# Patient Record
Sex: MALE | Race: WHITE | NOT HISPANIC OR LATINO | Employment: FULL TIME | URBAN - METROPOLITAN AREA
[De-identification: names, ages, dates, MRNs, and addresses within clinical notes are randomized per-mention and may not be internally consistent; named-entity substitution may affect disease eponyms.]

---

## 2017-08-11 DIAGNOSIS — E78.5 HYPERLIPIDEMIA LDL GOAL <130: ICD-10-CM

## 2017-08-14 NOTE — TELEPHONE ENCOUNTER
simvastatin     Last Written Prescription Date: 8/31/2016  Last Fill Quantity: 90, # refills: 3  Last Office Visit with [facility] or [facility] prescribing provider: 8/31/2016       Lab Results   Component Value Date    CHOL 200 08/31/2016     Lab Results   Component Value Date    HDL 53 08/31/2016     Lab Results   Component Value Date     08/31/2016     Lab Results   Component Value Date    TRIG 115 08/31/2016     Lab Results   Component Value Date    CHOLHDLRATIO 3.5 06/05/2015     Please make pt upcoming appt, due 8/2017.  Tegan Doty RN

## 2017-08-21 ENCOUNTER — TELEPHONE (OUTPATIENT)
Dept: FAMILY MEDICINE | Facility: CLINIC | Age: 57
End: 2017-08-21

## 2017-08-21 DIAGNOSIS — E78.5 HYPERLIPIDEMIA LDL GOAL <130: ICD-10-CM

## 2017-08-21 RX ORDER — SIMVASTATIN 20 MG
20 TABLET ORAL AT BEDTIME
Qty: 90 TABLET | Refills: 3 | OUTPATIENT
Start: 2017-08-21

## 2017-08-21 RX ORDER — SIMVASTATIN 20 MG
TABLET ORAL
Qty: 90 TABLET | Refills: 0 | OUTPATIENT
Start: 2017-08-21

## 2017-08-21 NOTE — TELEPHONE ENCOUNTER
Reason for Call:  Medication or medication refill:    Do you use a [facility]?  Name of the pharmacy and phone number for the current request:  [facility] [address] [facility] [address] [place] [address] & [address]    Name of the medication requested: simvastatin (ZOCOR) 20 MG tablet    Other request: Pharm called said refill was denied.     Can we leave a detailed message on this number? YES    Phone number patient can be reached at: Other phone number:  [phone]    Best Time: anytime     Call taken on 8/21/2017 at 12:53 PM by Giovana Rosales

## 2017-08-21 NOTE — TELEPHONE ENCOUNTER
Called # [phone] (home)     Pt stated he made an OV 9/15/2017    He has enough to get him to his appointment     Tamiko Pearson RN, BSN  [place] Triage

## 2017-11-21 DIAGNOSIS — E78.5 HYPERLIPIDEMIA LDL GOAL <130: ICD-10-CM

## 2017-11-21 RX ORDER — SIMVASTATIN 20 MG
20 TABLET ORAL AT BEDTIME
Qty: 30 TABLET | Refills: 0 | Status: SHIPPED | OUTPATIENT
Start: 2017-11-21 | End: 2017-12-15

## 2017-11-21 NOTE — TELEPHONE ENCOUNTER
Requested Prescriptions   Pending Prescriptions Disp Refills     simvastatin (ZOCOR) 20 MG tablet 90 tablet 3     Sig: Take 1 tablet (20 mg) by mouth At Bedtime    Statins Protocol Failed    11/21/2017  3:58 PM       Failed - LDL on file in past 12 months    Recent Labs   Lab Test  08/31/16   0821   LDL  124*            Passed - No abnormal creatine kinase in past 12 months    No lab results found.         Passed - Recent or future visit with authorizing provider    Patient had office visit in the last year or has a visit in the next 30 days with authorizing provider.  See chart review.              Passed - Patient is age 18 or older            Prescription approved per [facility] Refill Protocol.  Patient has physical scheduled.    Lisa Salcedo, BS, RN, PHN  [facility]) [phone]

## 2017-11-21 NOTE — TELEPHONE ENCOUNTER
Reason for Call:  Medication or medication refill:Refill  Do you use a [facility]?  Name of the pharmacy and phone number for the current request:  [facility] - [phone]    Name of the medication requested: simvastatin (ZOCOR) 20 MG tablet  Other request: n/a     Can we leave a detailed message on this number? YES    Phone number patient can be reached at: Home number on file [phone] (home)    Best Time: Anytime     Call taken on 11/21/2017 at 3:56 PM by Lynette Bermudez

## 2017-12-15 ENCOUNTER — OFFICE VISIT (OUTPATIENT)
Dept: FAMILY MEDICINE | Facility: CLINIC | Age: 57
End: 2017-12-15
Payer: COMMERCIAL

## 2017-12-15 VITALS
SYSTOLIC BLOOD PRESSURE: 124 MMHG | WEIGHT: 225 LBS | DIASTOLIC BLOOD PRESSURE: 78 MMHG | HEIGHT: 71 IN | TEMPERATURE: 97.9 F | OXYGEN SATURATION: 95 % | HEART RATE: 92 BPM | BODY MASS INDEX: 31.5 KG/M2

## 2017-12-15 DIAGNOSIS — Z51.81 MEDICATION MONITORING ENCOUNTER: ICD-10-CM

## 2017-12-15 DIAGNOSIS — Z00.00 ENCOUNTER FOR ROUTINE ADULT HEALTH EXAMINATION WITHOUT ABNORMAL FINDINGS: Primary | ICD-10-CM

## 2017-12-15 DIAGNOSIS — Z12.5 SCREENING FOR PROSTATE CANCER: ICD-10-CM

## 2017-12-15 DIAGNOSIS — Z23 NEED FOR PROPHYLACTIC VACCINATION AND INOCULATION AGAINST INFLUENZA: ICD-10-CM

## 2017-12-15 DIAGNOSIS — E78.5 HYPERLIPIDEMIA LDL GOAL <130: ICD-10-CM

## 2017-12-15 DIAGNOSIS — M79.674 PAIN OF RIGHT GREAT TOE: ICD-10-CM

## 2017-12-15 DIAGNOSIS — Z91.09 ENVIRONMENTAL ALLERGIES: ICD-10-CM

## 2017-12-15 DIAGNOSIS — Z86.0100 HISTORY OF COLONIC POLYPS: ICD-10-CM

## 2017-12-15 DIAGNOSIS — M25.561 CHRONIC PAIN OF RIGHT KNEE: ICD-10-CM

## 2017-12-15 DIAGNOSIS — Z12.11 SCREEN FOR COLON CANCER: ICD-10-CM

## 2017-12-15 DIAGNOSIS — G89.29 CHRONIC PAIN OF RIGHT KNEE: ICD-10-CM

## 2017-12-15 DIAGNOSIS — H61.23 BILATERAL IMPACTED CERUMEN: ICD-10-CM

## 2017-12-15 DIAGNOSIS — G47.33 OBSTRUCTIVE SLEEP APNEA SYNDROME: ICD-10-CM

## 2017-12-15 DIAGNOSIS — K21.9 GASTROESOPHAGEAL REFLUX DISEASE WITHOUT ESOPHAGITIS: ICD-10-CM

## 2017-12-15 LAB
ALBUMIN SERPL-MCNC: 4.1 G/DL (ref 3.4–5)
ALBUMIN UR-MCNC: NEGATIVE MG/DL
ALP SERPL-CCNC: 62 U/L (ref 40–150)
ALT SERPL W P-5'-P-CCNC: 41 U/L (ref 0–70)
ANION GAP SERPL CALCULATED.3IONS-SCNC: 7 MMOL/L (ref 3–14)
APPEARANCE UR: CLEAR
AST SERPL W P-5'-P-CCNC: 21 U/L (ref 0–45)
BILIRUB SERPL-MCNC: 1.1 MG/DL (ref 0.2–1.3)
BILIRUB UR QL STRIP: NEGATIVE
BUN SERPL-MCNC: 24 MG/DL (ref 7–30)
CALCIUM SERPL-MCNC: 9.3 MG/DL (ref 8.5–10.1)
CHLORIDE SERPL-SCNC: 108 MMOL/L (ref 94–109)
CHOLEST SERPL-MCNC: 204 MG/DL
CK SERPL-CCNC: 136 U/L (ref 30–300)
CO2 SERPL-SCNC: 28 MMOL/L (ref 20–32)
COLOR UR AUTO: YELLOW
CREAT SERPL-MCNC: 1 MG/DL (ref 0.66–1.25)
ERYTHROCYTE [DISTWIDTH] IN BLOOD BY AUTOMATED COUNT: 12.6 % (ref 10–15)
GFR SERPL CREATININE-BSD FRML MDRD: 77 ML/MIN/1.7M2
GLUCOSE SERPL-MCNC: 102 MG/DL (ref 70–99)
GLUCOSE UR STRIP-MCNC: NEGATIVE MG/DL
HCT VFR BLD AUTO: 45.9 % (ref 40–53)
HDLC SERPL-MCNC: 55 MG/DL
HGB BLD-MCNC: 15.7 G/DL (ref 13.3–17.7)
HGB UR QL STRIP: NEGATIVE
KETONES UR STRIP-MCNC: NEGATIVE MG/DL
LDLC SERPL CALC-MCNC: 124 MG/DL
LEUKOCYTE ESTERASE UR QL STRIP: NEGATIVE
MCH RBC QN AUTO: 30.8 PG (ref 26.5–33)
MCHC RBC AUTO-ENTMCNC: 34.2 G/DL (ref 31.5–36.5)
MCV RBC AUTO: 90 FL (ref 78–100)
NITRATE UR QL: NEGATIVE
NONHDLC SERPL-MCNC: 149 MG/DL
PH UR STRIP: 6 PH (ref 5–7)
PLATELET # BLD AUTO: 276 10E9/L (ref 150–450)
POTASSIUM SERPL-SCNC: 4 MMOL/L (ref 3.4–5.3)
PROT SERPL-MCNC: 7.3 G/DL (ref 6.8–8.8)
PSA SERPL-ACNC: 1.24 UG/L (ref 0–4)
RBC # BLD AUTO: 5.09 10E12/L (ref 4.4–5.9)
SODIUM SERPL-SCNC: 143 MMOL/L (ref 133–144)
SOURCE: NORMAL
SP GR UR STRIP: 1.01 (ref 1–1.03)
TRIGL SERPL-MCNC: 124 MG/DL
TSH SERPL DL<=0.005 MIU/L-ACNC: 1.61 MU/L (ref 0.4–4)
UROBILINOGEN UR STRIP-ACNC: 0.2 EU/DL (ref 0.2–1)
WBC # BLD AUTO: 6.4 10E9/L (ref 4–11)

## 2017-12-15 PROCEDURE — 82043 UR ALBUMIN QUANTITATIVE: CPT | Performed by: FAMILY MEDICINE

## 2017-12-15 PROCEDURE — 81003 URINALYSIS AUTO W/O SCOPE: CPT | Performed by: FAMILY MEDICINE

## 2017-12-15 PROCEDURE — 80053 COMPREHEN METABOLIC PANEL: CPT | Performed by: FAMILY MEDICINE

## 2017-12-15 PROCEDURE — 90471 IMMUNIZATION ADMIN: CPT | Performed by: FAMILY MEDICINE

## 2017-12-15 PROCEDURE — 80061 LIPID PANEL: CPT | Performed by: FAMILY MEDICINE

## 2017-12-15 PROCEDURE — G0103 PSA SCREENING: HCPCS | Performed by: FAMILY MEDICINE

## 2017-12-15 PROCEDURE — 90686 IIV4 VACC NO PRSV 0.5 ML IM: CPT | Performed by: FAMILY MEDICINE

## 2017-12-15 PROCEDURE — 84443 ASSAY THYROID STIM HORMONE: CPT | Performed by: FAMILY MEDICINE

## 2017-12-15 PROCEDURE — 36415 COLL VENOUS BLD VENIPUNCTURE: CPT | Performed by: FAMILY MEDICINE

## 2017-12-15 PROCEDURE — 85027 COMPLETE CBC AUTOMATED: CPT | Performed by: FAMILY MEDICINE

## 2017-12-15 PROCEDURE — 99396 PREV VISIT EST AGE 40-64: CPT | Mod: 25 | Performed by: FAMILY MEDICINE

## 2017-12-15 PROCEDURE — 82550 ASSAY OF CK (CPK): CPT | Performed by: FAMILY MEDICINE

## 2017-12-15 RX ORDER — NEOMYCIN/POLYMYXIN B/PRAMOXINE 3.5-10K-1
CREAM (GRAM) TOPICAL
COMMUNITY
Start: 2017-12-15

## 2017-12-15 RX ORDER — CETIRIZINE HYDROCHLORIDE 10 MG/1
10 TABLET ORAL EVERY EVENING
Qty: 30 TABLET | Refills: 1 | COMMUNITY
Start: 2017-12-15 | End: 2019-03-25

## 2017-12-15 NOTE — LETTER
[address] [address]                  [phone]   December 18, 2017    Nam May  [address] [facility] [place] [address]      Dear Nam,    Here is a summary of your recent test results:    Labs are overall quite good, except borderline elevated glucose and lipids.     We advise:     Continue current cares.   Balanced low cholesterol diet.   Regular exercise.     For additional lab test information, [email or web address] is an excellent reference.     Your test results are enclosed.      Please contact me if you have any questions.    In addition, here is a list of due or overdue Health Maintenance reminders.    Health Maintenance Due   Topic Date Due     Colon Cancer Screening - FIT Test - yearly  10/25/2014     Please call us at [phone] (or use imoji) to address the above recommendations.            Thank you very much for trusting [facility]..     Healthy regards,        Juan C Palacio M.D.      Results for orders placed or performed in visit on 12/15/17   Comprehensive metabolic panel   Result Value Ref Range    Sodium 143 133 - 144 mmol/L    Potassium 4.0 3.4 - 5.3 mmol/L    Chloride 108 94 - 109 mmol/L    Carbon Dioxide 28 20 - 32 mmol/L    Anion Gap 7 3 - 14 mmol/L    Glucose 102 (H) 70 - 99 mg/dL    Urea Nitrogen 24 7 - 30 mg/dL    Creatinine 1.00 0.66 - 1.25 mg/dL    GFR Estimate 77 >60 mL/min/1.7m2    GFR Estimate If Black >90 >60 mL/min/1.7m2    Calcium 9.3 8.5 - 10.1 mg/dL    Bilirubin Total 1.1 0.2 - 1.3 mg/dL    Albumin 4.1 3.4 - 5.0 g/dL    Protein Total 7.3 6.8 - 8.8 g/dL    Alkaline Phosphatase 62 40 - 150 U/L    ALT 41 0 - 70 U/L    AST 21 0 - 45 U/L   Lipid panel reflex to direct LDL Fasting   Result Value Ref Range    Cholesterol 204 (H) <200 mg/dL    Triglycerides 124 <150 mg/dL    HDL Cholesterol 55 >39 mg/dL    LDL Cholesterol Calculated 124 (H) <100 mg/dL    Non HDL Cholesterol 149 (H)  <130 mg/dL   CK total   Result Value Ref Range    CK Total 136 30 - 300 U/L   CBC with platelets   Result Value Ref Range    WBC 6.4 4.0 - 11.0 10e9/L    RBC Count 5.09 4.4 - 5.9 10e12/L    Hemoglobin 15.7 13.3 - 17.7 g/dL    Hematocrit 45.9 40.0 - 53.0 %    MCV 90 78 - 100 fl    MCH 30.8 26.5 - 33.0 pg    MCHC 34.2 31.5 - 36.5 g/dL    RDW 12.6 10.0 - 15.0 %    Platelet Count 276 150 - 450 10e9/L   TSH with free T4 reflex   Result Value Ref Range    TSH 1.61 0.40 - 4.00 mU/L   Prostate spec antigen screen   Result Value Ref Range    PSA 1.24 0 - 4 ug/L   Albumin Random Urine Quantitative with Creat Ratio   Result Value Ref Range    Creatinine Urine 227 mg/dL    Albumin Urine mg/L 10 mg/L    Albumin Urine mg/g Cr 4.41 0 - 17 mg/g Cr   *UA reflex to Microscopic and Culture ([place] and [facility] (except [place] and [place])   Result Value Ref Range    Color Urine Yellow     Appearance Urine Clear     Glucose Urine Negative NEG^Negative mg/dL    Bilirubin Urine Negative NEG^Negative    Ketones Urine Negative NEG^Negative mg/dL    Specific Gravity Urine 1.015 1.003 - 1.035    Blood Urine Negative NEG^Negative    pH Urine 6.0 5.0 - 7.0 pH    Protein Albumin Urine Negative NEG^Negative mg/dL    Urobilinogen Urine 0.2 0.2 - 1.0 EU/dL    Nitrite Urine Negative NEG^Negative    Leukocyte Esterase Urine Negative NEG^Negative    Source Midstream Urine

## 2017-12-15 NOTE — PATIENT INSTRUCTIONS
Follow up with sleep clinic as discussed    Use OTC Mucinex DM and warm showers to help with congestion    Follow up with podiatrist as discussed    Use OTC products to aid with earwax as discussed    [facility]                        To reach your care team during and after hours:   [phone]  To reach our pharmacy:        [phone]    Clinic Hours                        Our clinic hours are:    Monday   7:30 am to 7:00 pm                  Tuesday through Friday 7:30 am to 5:00 pm                             Saturday   8:00 am to 12:00 pm      Sunday   Closed      Pharmacy Hours                        Our pharmacy hours are:    Monday   8:30 am to 7:00 pm       Tuesday to Friday  8:30 am to 6:00 pm                       Saturday    9:00 am to 1:00 pm              Sunday    Closed              There is also information available at our web site:  [email or web address]    If your provider ordered any lab tests and you do not receive the results within 10 business days, please call the clinic.    If you need a medication refill please contact your pharmacy.  Please allow 2-3 business days for your refill to be completed.    Our clinic offers telephone visits and e visits.  Please ask one of your team members to explain more.      Use [employer] (secure email communication and access to your chart) to send your primary care provider a message or make an appointment. Ask someone on your Team how to sign up for [email or web address].  Immunizations                      Immunization History   Administered Date(s) Administered     Influenza (IIV3) PF 10/25/2013     Poliovirus, inactivated (IPV) 01/21/2011     TD (ADULT, 7+) 04/22/1997, 07/18/2003     TDAP Vaccine (Boostrix) 01/21/2011     Twinrix A/B 01/21/2011, 02/26/2011, 08/05/2011     Typhoid Oral 01/21/2011        Health Maintenance                         Health Maintenance Due   Topic Date Due     Colon Cancer Screening - FIT Test - yearly  10/25/2014     Discuss  Advance Directive Planning  09/08/2015     Prostate Test (PSA) - yearly  08/31/2017     Wellness Visit with your Primary Provider - yearly  08/31/2017     Flu Vaccine - yearly  09/01/2017       Preventive Health Recommendations  Male Ages 50   64    Yearly exam:             See your health care provider every year in order to  o   Review health changes.   o   Discuss preventive care.    o   Review your medicines if your doctor has prescribed any.     Have a cholesterol test every 5 years, or more frequently if you are at risk for high cholesterol/heart disease.     Have a diabetes test (fasting glucose) every three years. If you are at risk for diabetes, you should have this test more often.     Have a colonoscopy at age 50, or have a yearly FIT test (stool test). These exams will check for colon cancer.      Talk with your health care provider about whether or not a prostate cancer screening test (PSA) is right for you.    You should be tested each year for STDs (sexually transmitted diseases), if you re at risk.     Shots: Get a flu shot each year. Get a tetanus shot every 10 years.     Nutrition:    Eat at least 5 servings of fruits and vegetables daily.     Eat whole-grain bread, whole-wheat pasta and brown rice instead of white grains and rice.     Talk to your provider about Calcium and Vitamin D.     Lifestyle    Exercise for at least 150 minutes a week (30 minutes a day, 5 days a week). This will help you control your weight and prevent disease.     Limit alcohol to one drink per day.     No smoking.     Wear sunscreen to prevent skin cancer.     See your dentist every six months for an exam and cleaning.     See your eye doctor every 1 to 2 years.

## 2017-12-15 NOTE — NURSING NOTE
"Chief Complaint   Patient presents with     Physical       Initial /78  Pulse 92  Temp 97.9  F (36.6  C) (Oral)  Ht 5' 11\" (1.803 m)  Wt 225 lb (102.1 kg)  SpO2 95%  BMI 31.38 kg/m2 Estimated body mass index is 31.38 kg/(m^2) as calculated from the following:    Height as of this encounter: 5' 11\" (1.803 m).    Weight as of this encounter: 225 lb (102.1 kg)..  BP completed using cuff size: luis miguel Sosa MA  "

## 2017-12-15 NOTE — MR AVS SNAPSHOT
After Visit Summary   12/15/2017    Nam May    MRN: [id number]           Patient Information     Date Of Birth          1960        Visit Information        Provider Department      12/15/2017 7:40 AM Juan C Palacio MD [facility] [place]        Today's Diagnoses     Encounter for routine adult health examination without abnormal findings    -  1    Obstructive sleep apnea syndrome        Gastroesophageal reflux disease without esophagitis        Hyperlipidemia LDL goal <130        Environmental allergies        Pain of right great toe        Chronic pain of right knee        Bilateral impacted cerumen        History of colonic polyps        Screen for colon cancer        Screening for prostate cancer        Need for prophylactic vaccination and inoculation against influenza        Medication monitoring encounter          Care Instructions    Follow up with sleep clinic as discussed    Use OTC Mucinex DM and warm showers to help with congestion    Follow up with podiatrist as discussed    Use OTC products to aid with earwax as discussed    [facility] [place]                        To reach your care team during and after hours:   [phone]  To reach our pharmacy:        [phone]    Clinic Hours                        Our clinic hours are:    Monday   7:30 am to 7:00 pm                  Tuesday through Friday 7:30 am to 5:00 pm                             Saturday   8:00 am to 12:00 pm      Sunday   Closed      Pharmacy Hours                        Our pharmacy hours are:    Monday   8:30 am to 7:00 pm       Tuesday to Friday  8:30 am to 6:00 pm                       Saturday    9:00 am to 1:00 pm              Sunday    Closed              There is also information available at our web site:  [email or web address]    If your provider ordered any lab tests and you do not receive the results within 10 business days, please call the clinic.    If you need a medication refill  please contact your pharmacy.  Please allow 2-3 business days for your refill to be completed.    Our clinic offers telephone visits and e visits.  Please ask one of your team members to explain more.      Use [employer] (secure email communication and access to your chart) to send your primary care provider a message or make an appointment. Ask someone on your Team how to sign up for [employer]WithMet.  Immunizations                      Immunization History   Administered Date(s) Administered     Influenza (IIV3) PF 10/25/2013     Poliovirus, inactivated (IPV) 01/21/2011     TD (ADULT, 7+) 04/22/1997, 07/18/2003     TDAP Vaccine (Boostrix) 01/21/2011     Twinrix A/B 01/21/2011, 02/26/2011, 08/05/2011     Typhoid Oral 01/21/2011        Health Maintenance                         Health Maintenance Due   Topic Date Due     Colon Cancer Screening - FIT Test - yearly  10/25/2014     Discuss Advance Directive Planning  09/08/2015     Prostate Test (PSA) - yearly  08/31/2017     Wellness Visit with your Primary Provider - yearly  08/31/2017     Flu Vaccine - yearly  09/01/2017       Preventive Health Recommendations  Male Ages 50 - 64    Yearly exam:             See your health care provider every year in order to  o   Review health changes.   o   Discuss preventive care.    o   Review your medicines if your doctor has prescribed any.     Have a cholesterol test every 5 years, or more frequently if you are at risk for high cholesterol/heart disease.     Have a diabetes test (fasting glucose) every three years. If you are at risk for diabetes, you should have this test more often.     Have a colonoscopy at age 50, or have a yearly FIT test (stool test). These exams will check for colon cancer.      Talk with your health care provider about whether or not a prostate cancer screening test (PSA) is right for you.    You should be tested each year for STDs (sexually transmitted diseases), if you re at risk.     Shots: Get a flu shot each  year. Get a tetanus shot every 10 years.     Nutrition:    Eat at least 5 servings of fruits and vegetables daily.     Eat whole-grain bread, whole-wheat pasta and brown rice instead of white grains and rice.     Talk to your provider about Calcium and Vitamin D.     Lifestyle    Exercise for at least 150 minutes a week (30 minutes a day, 5 days a week). This will help you control your weight and prevent disease.     Limit alcohol to one drink per day.     No smoking.     Wear sunscreen to prevent skin cancer.     See your dentist every six months for an exam and cleaning.     See your eye doctor every 1 to 2 years.            Follow-ups after your visit        Additional Services     ORTHO  REFERRAL       [facility] is referring you to the Orthopedic  Services at [facility].       The  Representative will assist you in the coordination of your Orthopedic and Musculoskeletal Care as prescribed by your physician.    The  Representative will call you within 1 business day to help schedule your appointment, or you may contact the  Representative at:    All areas ~ [phone]     Type of Referral : [facility] / Foot & Ankle Surgery - Rt Great Toe    Non Surgical - Sports med - Rt knee pain      Timeframe requested: Within 2 weeks    Coverage of these services is subject to the terms and limitations of your health insurance plan.  Please call member services at your health plan with any benefit or coverage questions.      If X-rays, CT or MRI's have been performed, please contact the facility where they were done to arrange for , prior to your scheduled appointment.  Please bring this referral request to your appointment and present it to your specialist.                  Future tests that were ordered for you today     Open Future Orders        Priority Expected Expires Ordered    Fecal colorectal cancer screen (FIT)  "Routine 1/5/2018 3/9/2018 12/15/2017            Who to contact     If you have questions or need follow up information about today's clinic visit or your schedule please contact [facility] directly at [phone].  Normal or non-critical lab and imaging results will be communicated to you by [employer], letter or phone within 4 business days after the clinic has received the results. If you do not hear from us within 7 days, please contact the clinic through [employer] or phone. If you have a critical or abnormal lab result, we will notify you by phone as soon as possible.  Submit refill requests through [employer] or call your pharmacy and they will forward the refill request to us. Please allow 3 business days for your refill to be completed.          Additional Information About Your Visit        [employer] Information     [employer] gives you secure access to your electronic health record. If you see a primary care provider, you can also send messages to your care team and make appointments. If you have questions, please call your primary care clinic.  If you do not have a primary care provider, please call [phone] and they will assist you.        Care EveryWhere ID     This is your Care EveryWhere ID. This could be used by other organizations to access your [place] medical records  [id number]        Your Vitals Were     Pulse Temperature Height Pulse Oximetry BMI (Body Mass Index)       92 97.9  F (36.6  C) (Oral) 5' 11\" (1.803 m) 95% 31.38 kg/m2        Blood Pressure from Last 3 Encounters:   12/15/17 124/78   08/31/16 128/82   06/05/15 108/78    Weight from Last 3 Encounters:   12/15/17 225 lb (102.1 kg)   08/31/16 224 lb (101.6 kg)   06/05/15 220 lb 9 oz (100 kg)              We Performed the Following     *UA reflex to Microscopic and Culture ([place] and [facility] (except [place] and [place])     Albumin Random Urine Quantitative with Creat Ratio     CBC with platelets     CK total     " Comprehensive metabolic panel     HC FLU VAC PRESRV FREE QUAD SPLIT VIR 3+YRS IM     Lipid panel reflex to direct LDL Fasting     ORTHO  REFERRAL     Prostate spec antigen screen     REMOVE IMPACTED CERUMEN     TSH with free T4 reflex          Today's Medication Changes          These changes are accurate as of: 12/15/17  9:23 AM.  If you have any questions, ask your nurse or doctor.               Stop taking these medicines if you haven't already. Please contact your care team if you have questions.     sildenafil 20 MG tablet   Commonly known as:  REVATIO   Stopped by:  Juan C Palacio MD                    Primary Care Provider Office Phone # Fax #    Juan C Palacio [phone] [address] [facility] [address]        Equal Access to Services     [facility] HospitalCLAUDIA : Hadii sugar ramirez hadasho Soomaali, waaxda luqadaha, qaybta kaalmada adeegyada, stephanie roa . So [facility] [phone].    ATENCIÓN: Si habla español, tiene a le disposición servicios gratuitos de asistencia lingüística. Llame al [phone].    We comply with applicable federal civil rights laws and [place] laws. We do not discriminate on the basis of race, color, national origin, age, disability, sex, sexual orientation, or gender identity.            Thank you!     Thank you for choosing [facility]  for your care. Our goal is always to provide you with excellent care. Hearing back from our patients is one way we can continue to improve our services. Please take a few minutes to complete the written survey that you may receive in the mail after your visit with us. Thank you!             Your Updated Medication List - Protect others around you: Learn how to safely use, store and throw away your medicines at www.disposemymeds.org.          This list is accurate as of: 12/15/17  9:23 AM.  Always use your most recent med list.                   Brand Name Dispense Instructions for use  Diagnosis    aspirin 81 MG tablet      1 TABLET DAILY    Routine general medical examination at a health care facility       cetirizine 10 MG tablet    zyrTEC    30 tablet    Take 1 tablet (10 mg) by mouth every evening        MULTIVITAMIN PO      None Entered        Omega-3 Krill Oil 500 MG Caps           simvastatin 20 MG tablet    ZOCOR    30 tablet    Take 1 tablet (20 mg) by mouth At Bedtime    Hyperlipidemia LDL goal <130

## 2017-12-15 NOTE — PROGRESS NOTES
"SUBJECTIVE:   CC: Nam May is an 57 year old male who presents for preventative health visit.     Physical   Annual:     Getting at least 3 servings of Calcium per day::  Yes    Bi-annual eye exam::  Yes    Dental care twice a year::  Yes    Sleep apnea or symptoms of sleep apnea::  None    Diet::  Regular (no restrictions)    Frequency of exercise::  4-5 days/week    Duration of exercise::  30-45 minutes    Taking medications regularly::  Yes    Medication side effects::  None    Additional concerns today::  YES          Right Knee -- Nam reported that he had surgery in 2010 - still having issues when working out. Knee doesn't hurt but feels like it there is \"glue\" in his knees. Notes the sensation started during use of \"incline\" machine.    Sleep apnea -- Nam notes that his sleep has been \"okay\" but still occasionally gets tired on waking. Plans to decrease emily to 195 lbs from current weight to aid with sleep aids.    Umbilical hernia -- Nam wanted to have his umbilical hernia checked, but does not note any pain or odd sensations.    Allergies -- Nam would like to discontinue his of Zyrtec due to concerns of mucus build up in the nose. Also he notes he is fine with allergies during the winter season.    Retinal tear in eye -- Nam notes he is still following up with opthomologist. Notes vision has a persistent \"dirty glass\" look.    Heartburn -- well controlled.    Toe fusion --  Surgery 2003 -- Nam is concerned about the appearance of his toe. Doesn't know if it is due to a \"pin\" from surgery.       Today's PHQ-2 Score:   PHQ-2 ( 1999 Pfizer) 12/12/2017   Q1: Little interest or pleasure in doing things 0   Q2: Feeling down, depressed or hopeless 0   PHQ-2 Score 0   Q1: Little interest or pleasure in doing things Not at all   Q2: Feeling down, depressed or hopeless Not at all   PHQ-2 Score 0       Abuse: Current or Past(Physical, Sexual or Emotional)- No  Do you feel safe in your environment - " Yes    Social History   Substance Use Topics     Smoking status: Former Smoker     Types: Cigars     Smokeless tobacco: Never Used      Comment: rare cigar <10 per year     Alcohol use 2.4 - 7.2 oz/week     4 - 12 Standard drinks or equivalent per week      Comment: avg 4-12 beers per week     No flowsheet data found.No flowsheet data found.      Last PSA:   PSA   Date Value Ref Range Status   08/31/2016 0.91 0 - 4 ug/L Final     Comment:     Assay Method:  Chemiluminescence using Siemens Vista analyzer       Reviewed orders with patient. Reviewed health maintenance and updated orders accordingly - Yes  Reviewed and updated as needed this visit by clinical staff  Tobacco  Allergies  Med Hx  Surg Hx  Fam Hx  Soc Hx        Reviewed and updated as needed this visit by Provider  Allergies        Health Maintenance     Colonoscopy:  5 years, due 3/18 (last 3/130   FIT:  Yearly, due (last 3/12)              PSA:  Yearly, due (last 8/16)   DEXA:  n/a    Health Maintenance Due   Topic Date Due     FIT Q1 YR  10/25/2014       Current Problem List    Patient Active Problem List   Diagnosis     History of colonic polyps     GERD (gastroesophageal reflux disease)     Hyperlipidemia LDL goal <130     Seasonal allergies     Sleep apnea     ED (erectile dysfunction)     Retinal tear of left eye       Past Medical History    Past Medical History:   Diagnosis Date     ED (erectile dysfunction) 2013    mild - Dr Cotto     Family history of malignant neoplasm of gastrointestinal tract     PGM - colon ca     GERD (gastroesophageal reflux disease)      Hyperlipidemia LDL goal <130 2008     Personal history of colonic polyps 12/99, 12/02, 3/05, 12/08    adenoma, tubular adenoma      Retinal tear of left eye 2/15    VRS     Seasonal allergies summer/fall    Allergic rhinitis H/O AIT - ragweed     Sleep apnea 4/11    severe - CPAP       Past Surgical History    Past Surgical History:   Procedure Laterality Date     COLONOSCOPY   , , 3/05, , 3/13    Dr Gordillo- due 2018 - tubular adenoma      HC REMOVE TONSILS/ADENOIDS,<13 Y/O  1964    T & A <12y.o.     STRESS ECHO ([facility])  10/08    normal     SURGICAL HISTORY OF -       rt great toe fusion     SURGICAL HISTORY OF -   9/10    Rt Knee Arthroscopy - Dr Curtis     SURGICAL HISTORY OF -   2/15    VRS - surgery       Current Medications    Current Outpatient Prescriptions   Medication Sig Dispense Refill     cetirizine (ZYRTEC) 10 MG tablet Take 1 tablet (10 mg) by mouth every evening 30 tablet 1     Omega-3 Krill Oil 500 MG CAPS        simvastatin (ZOCOR) 20 MG tablet Take 1 tablet (20 mg) by mouth At Bedtime 30 tablet 0     ASPIRIN 81 MG OR TABS 1 TABLET DAILY       MULTIVITAMIN OR None Entered         Allergies    No Known Allergies    Immunizations    Immunization History   Administered Date(s) Administered     Influenza (IIV3) PF 10/25/2013     Influenza Vaccine IM 3yrs+ 4 Valent IIV4 12/15/2017     MMR 08/15/1990     Poliovirus, inactivated (IPV) 2011     TD (ADULT, 7+) 1997, 2003     TDAP Vaccine (Boostrix) 2011     Twinrix A/B 2011, 2011, 2011     Typhoid Oral 2011       Family History    Family History   Problem Relation Age of Onset     CANCER Father      lymphoma - stage 1E - ? aggressive type -      Alzheimer Disease Father      CEREBROVASCULAR DISEASE Father       2016     Obesity Mother      Hypertension Mother      C.A.D. Paternal Grandfather       at age 56     Cancer - colorectal Paternal Grandmother      40's     CEREBROVASCULAR DISEASE Maternal Grandfather       at age 79     C.A.D. Maternal Grandfather        Social History    Social History     Social History     Marital status:      Spouse name: Arabella     Number of children: 2     Years of education: 18     Occupational History      [employer]     Social History Main Topics     Smoking status: Former Smoker     Types:  "Cigars     Smokeless tobacco: Never Used      Comment: rare cigar <10 per year     Alcohol use 2.4 - 7.2 oz/week     4 - 12 Standard drinks or equivalent per week      Comment: avg 4-12 beers per week     Drug use: No     Sexual activity: Yes     Partners: Female     Other Topics Concern     Caffeine Concern Yes     2 cups qd     Exercise Yes     5-6 days per week     Seat Belt Yes     Parent/Sibling W/ Cabg, Mi Or Angioplasty Before 65f 55m? No     Social History Narrative     Review of Systems  Constitutional, HEENT, cardiovascular, pulmonary, GI, , musculoskeletal, neuro, skin, endocrine and psych systems are negative, except as in HPI or otherwise noted     This document serves as a record of the services and decisions personally performed and made by Juan C Palacio MD FAAFP. It was created on their behalf by Mahesh Duff, a trained medical scribe. The creation of this document is based the provider's statements to the medical scribe.  Mahesh Duff December 15, 2017 8:25 AM      OBJECTIVE:   /78  Pulse 92  Temp 97.9  F (36.6  C) (Oral)  Ht 5' 11\" (1.803 m)  Wt 225 lb (102.1 kg)  SpO2 95%  BMI 31.38 kg/m2  Physical Exam  GENERAL: healthy, alert and no distress, obese   HENT: ear canals have build up of cerumen and TM's normal upon viewing with otoscope, nose and mouth without ulcers or lesions upon viewing with otoscope  RESP: lungs clear to auscultation - no rales, no rhonchi, no wheezes  CV: regular rates and rhythm, normal S1 S2, no S3 or S4 and no murmur, no click or rub - no carotid bruits.  ABDOMEN: soft, no tenderness, no  hepatosplenomegaly, no masses, normal bowel sounds  MS: extremities- no gross deformities noted, no edema  SKIN: no suspicious lesions, no rashes to visible skin  : testicles normal without atrophy or masses, no hernias, penis normal without urethral discharge  RECTAL: normal sphincter tone, no rectal masses, prostate smooth, without masses  NEURO: mentation intact and speech " normal  BACK: no CVA tenderness, no paralumbar tenderness  PSYCH: affect normal/bright    ASSESSMENT/PLAN:       ICD-10-CM    1. Encounter for routine adult health examination without abnormal findings Z00.00 Comprehensive metabolic panel     Lipid panel reflex to direct LDL Fasting     CK total     CBC with platelets     TSH with free T4 reflex     Prostate spec antigen screen     Albumin Random Urine Quantitative with Creat Ratio     *UA reflex to Microscopic and Culture ([facility] (except [place] and [place])     Fecal colorectal cancer screen (FIT)   2. Obstructive sleep apnea syndrome G47.33 TSH with free T4 reflex   3. Gastroesophageal reflux disease without esophagitis K21.9 CBC with platelets   4. Hyperlipidemia LDL goal <130 E78.5 Comprehensive metabolic panel     Lipid panel reflex to direct LDL Fasting     CK total   5. Environmental allergies Z91.09    6. Pain of right great toe M79.674 ORTHO  REFERRAL   7. Chronic pain of right knee M25.561 ORTHO  REFERRAL    G89.29    8. Bilateral impacted cerumen H61.23 REMOVE IMPACTED CERUMEN   9. History of colonic polyps Z86.010 Fecal colorectal cancer screen (FIT)   10. Screen for colon cancer Z12.11 Fecal colorectal cancer screen (FIT)   11. Screening for prostate cancer Z12.5 Prostate spec antigen screen   12. Need for prophylactic vaccination and inoculation against influenza Z23 HC FLU VAC PRESRV FREE QUAD SPLIT VIR 3+YRS IM   13. Medication monitoring encounter Z51.81 Comprehensive metabolic panel     Lipid panel reflex to direct LDL Fasting     CK total     CBC with platelets     TSH with free T4 reflex     Albumin Random Urine Quantitative with Creat Ratio     *UA reflex to Microscopic and Culture ([facility] (except [place] and [place])     Discussed treatment/modality options, including risk and benefits, he desires advised alcohol consumption 1oz per day or less, advised aspirin 81 mg po daily,  "advised 1 multivitamin per day, advised calcium 8905-3506 mg/d and Vitamin D 800-1200 IU/d, advised dentist every 6 months, advised diet, exercise, and weight loss, advised opthalmologist every 1-2 years, advised self testicular exam q month, further diagnostic(s), further health care maintenance, further lab(s), immunizations (influenza), medication refill(s), OTC meds, referrals (Orthopedics), and observation. All diagnosis above reviewed and noted above, otherwise stable.  See [employer] orders for further details.  Follow up in 1 year(s) and as needed.    Health Maintenance Due   Topic Date Due     FIT Q1 YR  10/25/2014     COUNSELING:   Reviewed preventive health counseling, as reflected in patient instructions     reports that he has quit smoking. His smoking use included Cigars. He has never used smokeless tobacco.    Estimated body mass index is 31.38 kg/(m^2) as calculated from the following:    Height as of this encounter: 5' 11\" (1.803 m).    Weight as of this encounter: 225 lb (102.1 kg).   Weight management plan: Discussed healthy diet and exercise guidelines and patient will follow up in 12 months in clinic to re-evaluate.    Counseling Resources:  ATP IV Guidelines  Pooled Cohorts Equation Calculator  FRAX Risk Assessment  ICSI Preventive Guidelines  Dietary Guidelines for Americans, 2010  USDA's MyPlate  ASA Prophylaxis  Lung CA Screening    The information in this document, created by the medical scribe for me, accurately reflects the services I personally performed and the decisions made by me. I have reviewed and approved this document for accuracy.   Juan C Palacio MD FAAFP            Juan C Palacio MD, FAAFP    [address]  [address] [phone] Fax    "

## 2017-12-17 LAB
CREAT UR-MCNC: 227 MG/DL
MICROALBUMIN UR-MCNC: 10 MG/L
MICROALBUMIN/CREAT UR: 4.41 MG/G CR (ref 0–17)

## 2017-12-18 RX ORDER — SIMVASTATIN 20 MG
TABLET ORAL
Qty: 90 TABLET | Refills: 3 | Status: SHIPPED | OUTPATIENT
Start: 2017-12-18 | End: 2018-11-24

## 2017-12-18 NOTE — TELEPHONE ENCOUNTER
Requested Prescriptions   Pending Prescriptions Disp Refills     simvastatin (ZOCOR) 20 MG tablet [Pharmacy Med Name: SIMVASTATIN 20MG TABLETS]  Last Written Prescription Date:  11/21/2017  Last Fill Quantity: 30 tablet,  # refills: 0   Last Office Visit with [facility], [facility] or [facility] prescribing provider:  12/15/2017   Future Office Visit:      30 tablet 0     Sig: TAKE 1 TABLET(20 MG) BY MOUTH AT BEDTIME    Statins Protocol Passed    12/15/2017  5:10 PM       Passed - LDL on file in past 12 months    Recent Labs   Lab Test  12/15/17   0814   LDL  124*          Passed - No abnormal creatine kinase in past 12 months    No lab results found.         Passed - Recent or future visit with authorizing provider    Patient had office visit in the last year or has a visit in the next 30 days with authorizing provider.  See chart review.          Passed - Patient is age 18 or older

## 2017-12-18 NOTE — TELEPHONE ENCOUNTER
Routing refill request to provider for review/approval because:  Labs out of range:  See below, LDL.    Tegan Doty RN  [facility]

## 2018-03-23 ENCOUNTER — OFFICE VISIT (OUTPATIENT)
Dept: ORTHOPEDICS | Facility: CLINIC | Age: 58
End: 2018-03-23
Payer: COMMERCIAL

## 2018-03-23 ENCOUNTER — RADIANT APPOINTMENT (OUTPATIENT)
Dept: GENERAL RADIOLOGY | Facility: CLINIC | Age: 58
End: 2018-03-23
Attending: PODIATRIST
Payer: COMMERCIAL

## 2018-03-23 ENCOUNTER — OFFICE VISIT (OUTPATIENT)
Dept: PODIATRY | Facility: CLINIC | Age: 58
End: 2018-03-23
Payer: COMMERCIAL

## 2018-03-23 ENCOUNTER — RADIANT APPOINTMENT (OUTPATIENT)
Dept: GENERAL RADIOLOGY | Facility: CLINIC | Age: 58
End: 2018-03-23
Attending: FAMILY MEDICINE
Payer: COMMERCIAL

## 2018-03-23 VITALS
DIASTOLIC BLOOD PRESSURE: 75 MMHG | WEIGHT: 223 LBS | SYSTOLIC BLOOD PRESSURE: 121 MMHG | HEIGHT: 71 IN | BODY MASS INDEX: 31.22 KG/M2

## 2018-03-23 VITALS
DIASTOLIC BLOOD PRESSURE: 70 MMHG | BODY MASS INDEX: 31.22 KG/M2 | SYSTOLIC BLOOD PRESSURE: 122 MMHG | HEIGHT: 71 IN | WEIGHT: 223 LBS

## 2018-03-23 DIAGNOSIS — M25.561 ARTHRALGIA OF RIGHT LOWER LEG: Primary | ICD-10-CM

## 2018-03-23 DIAGNOSIS — M25.561 ARTHRALGIA OF RIGHT LOWER LEG: ICD-10-CM

## 2018-03-23 DIAGNOSIS — M79.671 RIGHT FOOT PAIN: Primary | ICD-10-CM

## 2018-03-23 DIAGNOSIS — M19.071 PRIMARY LOCALIZED OSTEOARTHROSIS OF RIGHT ANKLE AND FOOT: ICD-10-CM

## 2018-03-23 DIAGNOSIS — M79.671 RIGHT FOOT PAIN: ICD-10-CM

## 2018-03-23 DIAGNOSIS — M25.851 HIP IMPINGEMENT SYNDROME, RIGHT: ICD-10-CM

## 2018-03-23 PROCEDURE — 73562 X-RAY EXAM OF KNEE 3: CPT

## 2018-03-23 PROCEDURE — 99243 OFF/OP CNSLTJ NEW/EST LOW 30: CPT | Performed by: FAMILY MEDICINE

## 2018-03-23 PROCEDURE — 99203 OFFICE O/P NEW LOW 30 MIN: CPT | Performed by: PODIATRIST

## 2018-03-23 PROCEDURE — 73630 X-RAY EXAM OF FOOT: CPT | Mod: RT

## 2018-03-23 NOTE — PATIENT INSTRUCTIONS
1. Arthralgia of right lower leg    2. Hip impingement syndrome, right      Discussed exam - knee is stable, no fluid today.  Would recommend addressing lack of hip mobility and stabilzation.  Reviewed xray - well preserved joint sapce  Physical therapy: [facility] - [phone]  Active to your tolerance    Follow up as needed. Call direct clinic number [[phone]] at any time with questions or concerns.      NEW WEBSITE HAS LAUNCHED  [email or web address]    We care about your feedback and use it to improve our services. Please leave feedback on the Testimonials & Reviews page.

## 2018-03-23 NOTE — LETTER
"    3/23/2018         RE: Nam May  [address] [facility] [place] [address]        Dear Colleague,    Thank you for referring your patient, Nam May, to the [facility]. Please see a copy of my visit note below.    ASSESSMENT & PLAN    1. Arthralgia of right lower leg    2. Hip impingement syndrome, right      Discussed exam - knee is stable, no fluid today.  Would recommend addressing lack of hip mobility and stabilzation.  Reviewed xray - well preserved joint sapce  Physical therapy: [facility] - [phone]  Active to your tolerance    Follow up as needed. Call direct clinic number [[phone]] at any time with questions or concerns.    -----    SUBJECTIVE  Nam May is a/an 57 year old male who is seen in consultation at the request of  Juan C Palacio M.D. for evaluation of right knee pain. The patient is seen by themselves.    Onset: 8-9 month(s) ago.  Patient feels like his right knee seems to be related to altered gate due to toe/foot issues that he is treating with Dr. Navarro.  Location of Pain: right diffuse knee pain  Rating of Pain at worst: 4/10  Rating of Pain Currently: 0/10  Worsened by: knee fatigues after prolonged walking  Better with: rest  Treatments tried: ibuprofen  Associated symptoms:  Denies any swelling, locking or catching  Orthopedic history: none  Relevant surgical history:  right menisectomy 2011  Patient Social History: works in IT    Patient's past medical, surgical, social, and family histories were reviewed today and no changes are noted.    REVIEW OF SYSTEMS:  10 point ROS is negative other than symptoms noted above in HPI, Past Medical History or as stated below  Constitutional: NEGATIVE for fever, chills, change in weight  Skin: NEGATIVE for worrisome rashes, moles or lesions  GI/: NEGATIVE for bowel or bladder changes  Neuro: NEGATIVE for weakness, dizziness or paresthesias    OBJECTIVE:  /75  Ht 5' 11\" " (1.803 m)  Wt 223 lb (101.2 kg)  BMI 31.1 kg/m2   General: healthy, alert and in no distress  HEENT: no scleral icterus or conjunctival erythema  Skin: no suspicious lesions or rash. No jaundice.  CV: no pedal edema  Resp: normal respiratory effort without conversational dyspnea   Psych: normal mood and affect  Gait: normal steady gait with appropriate coordination and balance  Neuro: Normal light sensory exam of lower extremity  MSK:  RIGHT KNEE  Inspection:    normal alignment  Palpation:    NonTender about the lateral patellar facet, medial patellar facet, lateral joint line and medial joint line. Remainder of bony and ligamentous landmarks are nontender.    No effusion is present    Patellofemoral crepitus is Present  Range of Motion:     00 extension to 1350 flexion  Strength:    Extensor mechanism intact  Special Tests:    Negative: Patellar grind, MCL/valgus stress (0 & 30 deg), LCL/varus stress (0 & 30 deg), Lachman's, posterior drawer, Isi's    RIGHT HIP  Inspection:    No obvious deformity or asymmetry, leg length difference (right longer)  Active Range of Motion:     Flexion full, IR lacking but no painful / ER  full  Strength:    Flexion 5/5 /  Special Tests:    Negative: Logroll, anterior impingement (FADIR)    Independent visualization of the below image:  KNEE STANDING AP BILATERAL SUNRISE BILATERAL LATERAL RIGHT     3/23/2018 2:23 PM      HISTORY: Arthralgia of right lower leg     COMPARISON: Right knee x-ray from 7/18/2010.         IMPRESSION: Joint spaces are well-preserved. No acute fracture or  subluxation.     ASTRID ANAYA MD    Patient's conditions were thoroughly discussed during today's visit with greater than 50% of the visit spent counseling the patient with total time spent face-to-face with the patient being 20 minutes.    Josh Duong, DO [facility]        Again, thank you for allowing me to participate in the care of your patient.         Sincerely,        Josh Duong DO

## 2018-03-23 NOTE — LETTER
3/23/2018         RE: Nam May  [address] [address] [address]        Dear Colleague,    Thank you for referring your patient, Nam May, to the [facility]. Please see a copy of my visit note below.    PATIENT HISTORY:   Nam May is a 57 year old male who presents to clinic for pain to right foot. Notes that he had a fusion 2-4 years ago by an outside provider. Notes pain with walking. 4/10. Has not done anything for this. Would like to know what can be done to stabilize the foot. Notes pain is intermittent.     Review of Systems:  Patient denies fever, chills, rash, wound, stiffness, limping, numbness, weakness, heart burn, blood in stool, chest pain with activity, calf pain when walking, shortness of breath with activity, chronic cough, easy bleeding/bruising, swelling of ankles, excessive thirst, fatigue, depression, anxiety.     PAST MEDICAL HISTORY:   Past Medical History:   Diagnosis Date     ED (erectile dysfunction) 2013    mild - Dr Cotto     Family history of malignant neoplasm of gastrointestinal tract     PGM - colon ca     GERD (gastroesophageal reflux disease)      Hyperlipidemia LDL goal <130 2008     Personal history of colonic polyps 12/99, 12/02, 3/05, 12/08    adenoma, tubular adenoma      Retinal tear of left eye 2/15    VRS     Seasonal allergies summer/fall    Allergic rhinitis H/O AIT - ragweed     Sleep apnea 4/11    severe - CPAP        PAST SURGICAL HISTORY:   Past Surgical History:   Procedure Laterality Date     COLONOSCOPY  12/99, 12/02, 3/05, 12/08, 3/13    Dr Gordillo- due 2018 - tubular adenoma      HC REMOVE TONSILS/ADENOIDS,<11 Y/O  1964    T & A <12y.o.     STRESS ECHO ([facility])  10/08    normal     SURGICAL HISTORY OF -   1/03    rt great toe fusion     SURGICAL HISTORY OF -   9/10    Rt Knee Arthroscopy - Dr Curtis     SURGICAL HISTORY OF -   2/15    VRS - surgery        MEDICATIONS:   Current Outpatient Prescriptions:       "simvastatin (ZOCOR) 20 MG tablet, TAKE 1 TABLET(20 MG) BY MOUTH AT BEDTIME, Disp: 90 tablet, Rfl: 3     cetirizine (ZYRTEC) 10 MG tablet, Take 1 tablet (10 mg) by mouth every evening, Disp: 30 tablet, Rfl: 1     Omega-3 Krill Oil 500 MG CAPS, , Disp: , Rfl:      ASPIRIN 81 MG OR TABS, 1 TABLET DAILY, Disp: , Rfl:      MULTIVITAMIN OR, None Entered, Disp: , Rfl:      ALLERGIES:  No Known Allergies     SOCIAL HISTORY:   Social History     Social History     Marital status:      Spouse name: Arabella     Number of children: 2     Years of education: 18     Occupational History      [employer]     Social History Main Topics     Smoking status: Former Smoker     Types: Cigars     Smokeless tobacco: Never Used      Comment: rare cigar <10 per year     Alcohol use 2.4 - 7.2 oz/week     4 - 12 Standard drinks or equivalent per week      Comment: avg 4-12 beers per week     Drug use: No     Sexual activity: Yes     Partners: Female     Other Topics Concern     Caffeine Concern Yes     2 cups qd     Exercise Yes     5-6 days per week     Seat Belt Yes     Parent/Sibling W/ Cabg, Mi Or Angioplasty Before 65f 55m? No     Social History Narrative        FAMILY HISTORY:   Family History   Problem Relation Age of Onset     CANCER Father      lymphoma - stage 1E - ? aggressive type -      Alzheimer Disease Father      CEREBROVASCULAR DISEASE Father       2016     Obesity Mother      Hypertension Mother      C.A.D. Paternal Grandfather       at age 56     Cancer - colorectal Paternal Grandmother      40's     CEREBROVASCULAR DISEASE Maternal Grandfather       at age 79     C.A.D. Maternal Grandfather         EXAM:Vitals: /70  Ht 5' 11\" (1.803 m)  Wt 223 lb (101.2 kg)  BMI 31.1 kg/m2  BMI= Body mass index is 31.1 kg/(m^2).    General appearance: Patient is alert and fully cooperative with history & exam.  No sign of distress is noted during the visit.     Psychiatric: Affect is pleasant & " appropriate.  Patient appears motivated to improve health.     Respiratory: Breathing is regular & unlabored while sitting.     HEENT: Hearing is intact to spoken word.  Speech is clear.  No gross evidence of visual impairment that would impact ambulation.     Dermatologic: Skin is intact to both lower extremities without significant lesions, rash or abrasion.  No paronychia or evidence of soft tissue infection is noted.     Vascular: DP & PT pulses are intact & regular bilaterally.  No significant edema or varicosities noted.  CFT and skin temperature is normal to both lower extremities.     Neurologic: Lower extremity sensation is intact to light touch.  No evidence of weakness or contracture in the lower extremities.  No evidence of neuropathy.     Musculoskeletal: Patient is ambulatory without assistive device or brace.  Pain to the 1st metatarsal phalangeal joint. No range of motion due to previous fusion. Prominent spur medial right 1st interphalangeal joint.       Radiographs:  I personally reviewed the xrays. Arthritic changes noted to right 1st interphalangeal joint.  1st metatarsal phalangeal joint has been fused.      ASSESSMENT:    Right foot pain  Primary localized osteoarthrosis of right ankle and foot     PLAN:  [facility] in [facility]. Reviewed xrays.  Talked about arthritis and treatments including orthotics, injections, icing, NSAIDS, bracing, physical therapy, compounding pain cream, or surgical intervention.    Pain with pressure to dorsal 1st metatarsal. With previous fusion, want to make sure this area is completely healed. Will order CT and call with results. Don't know if there is much that can be done if fusion is healed.        Milly Navarro DPM, Podiatry/Foot and Ankle Surgery    Weight management plan: Patient was referred to their PCP to discuss a diet and exercise plan.      Again, thank you for allowing me to participate in the care of your patient.         Sincerely,        Milly Navarro DPM, Podiatry/Foot and Ankle Surgery

## 2018-03-23 NOTE — PATIENT INSTRUCTIONS
Thank you for choosing [facility]!    DR. [facility] SCHEDULE  MONDAY AM - [place] TUESDAY - [place]   [address] [address] [address] [address] [place] [address]   [phone] / [phone] / [phone]       WEDNESDAY - [place] FRIDAY AM - [facility]   [address]   [place] [address] [place] [address]   [phone] / [phone]       FRIDAY PM - [place] SCHEDULE SURGERY: [phone]   [address] BILLING QUESTIONS: [phone]   [place] [address] AFTER HOURS: [phone]   [phone] / [phone] APPOINTMENTS: [phone]     [facility] SCHEDULING  They should be calling you within 24 hours to schedule your scan.  If not, please call the location discussed at your appointment.    1) [facility] [place]:       [phone]      [address] [place] [address]    2) [facility]:      [phone]      [address]      [place] [address]    3) [facility]:       [phone]      [address] [place] [address]    * We will call you with the results. Please allow 24-48 hours for the results to be read and final.    OSTEOARTHRITIS OF THE FOOT & ANKLE  Osteoarthritis is a condition characterized by the breakdown and eventual loss of cartilage in one or more joints. Cartilage (the connective tissue found at the end of the bones in the joints) protects and cushions the bones during movement. When cartilage deteriorates or is lost, symptoms develop that can restrict one s ability to easily perform daily activities.  Osteoarthritis is also known as degenerative arthritis, reflecting its nature to develop as part of the aging process. As the most common form of arthritis, osteoarthritis affects millions of Americans. Some people refer to osteoarthritis simply as arthritis, even though  there are many different types of arthritis.  Osteoarthritis appears at various joints throughout the body, including the hands, feet, spine, hips, and knees. In the foot, the disease most frequently occurs in the big toe, although it is also often found in the midfoot and ankle.  CAUSES  Osteoarthritis is considered a  wear and tear  disease because the cartilage in the joint wears down with repeated stress and use over time. As the cartilage deteriorates and gets thinner, the bones lose their protective covering and eventually may rub together, causing pain and inflammation of the joint.  An injury may also lead to osteoarthritis, although it may take months or years after the injury for the condition to develop. For example, osteoarthritis in the big toe is often caused by kicking or jamming the toe, or by dropping something on the toe. Osteoarthritis in the midfoot is often caused by dropping something on it, or by a sprain or fracture. In the ankle, osteoarthritis is usually caused by a fracture and occasionally by a severe sprain.  Sometimes osteoarthritis develops as a result of abnormal foot mechanics such as flat feet or high arches. A flat foot causes less stability in the ligaments (bands of tissue that connect bones), resulting in excessive strain on the joints, which can cause arthritis. A high arch is rigid and lacks mobility, causing a jamming of joints that creates an increased risk of arthritis.  SYMPTOMS  People with osteoarthritis in the foot or ankle experience, in varying degrees, one or more of the following: Pain and stiffness in the joint, swelling in or near the joint, or difficulty walking or bending the joint.   Some patients with osteoarthritis also develop a bone spur (a bony protrusion) at the affected joint. Shoe pressure may cause pain at the site of a bone spur, and in some cases blisters or calluses may form over its surface. Bone spurs can also limit the movement of the  joint.    DIAGNOSIS  In diagnosing osteoarthritis, the foot and ankle surgeon will examine the foot thoroughly, looking for swelling in the joint, limited mobility, and pain with movement. In some cases, deformity and/or enlargement (spur) of the joint may be noted. X-rays may be ordered to evaluate the extent of the disease.  NON-SURGICAL TREATMENT  To help relieve symptoms, the surgeon may begin treating osteoarthritis with one or more of the following non-surgical approaches:  Oral medications. Nonsteroidal anti-inflammatory drugs (NSAIDs), such as ibuprofen, are often helpful in reducing the inflammation and pain. Occasionally a prescription for a steroid medication is needed to adequately reduce symptoms.   Orthotic devices. Custom orthotic devices (shoe inserts) are often prescribed to provide support to improve the foot s mechanics or cushioning to help minimize pain.   Bracing. Bracing, which restricts motion and supports the joint, can reduce pain during walking and help prevent further deformity.   Immobilization. Protecting the foot from movement by wearing a cast or removable cast-boot may be necessary to allow the inflammation to resolve.   Steroid injections. In some cases, steroid injections are applied to the affected joint to deliver anti-inflammatory medication.   Physical therapy. Exercises to strengthen the muscles, especially when the osteoarthritis occurs in the ankle, may give the patient greater stability and help avoid injury that might worsen the condition.   DO I NEED SURGERY?  When osteoarthritis has progressed substantially or failed to improve with non-surgical treatment, surgery may be recommended. In advanced cases, surgery may be the only option. The goal of surgery is to decrease pain and improve function. The foot and ankle surgeon will consider a number of factors when selecting the procedure best suited to the patient s condition and lifestyle.      Body Mass Index (BMI)  Many  things can cause foot and ankle problems. Foot structure, activity level, foot mechanics and injuries are common causes of pain.  One very important issue that often goes unmentioned, is body weight. Extra weight can cause increased stress on muscles, ligaments, bones and tendons.  Sometimes just a few extra pounds is all it takes to put one over her/his threshold. Without reducing that stress, it can be difficult to alleviate pain. Some people are uncomfortable addressing this issue, but we feel it is important for you to think about it. As Foot &  Ankle specialists, our job is addressing the lower extremity problem and possible causes. Regarding extra body weight, we encourage patients to discuss diet and weight management plans with their primary care doctors. It is this team approach that gives you the best opportunity for pain relief and getting you back on your feet.

## 2018-03-23 NOTE — MR AVS SNAPSHOT
After Visit Summary   3/23/2018    Nam May    MRN: [id number]           Patient Information     Date Of Birth          1960        Visit Information        Provider Department      3/23/2018 2:00 PM Josh Duong, DO [facility] SP[facility]TS MEDICINE        Today's Diagnoses     Arthralgia of right lower leg    -  1    Hip impingement syndrome, right          Care Instructions    1. Arthralgia of right lower leg    2. Hip impingement syndrome, right      Discussed exam - knee is stable, no fluid today.  Would recommend addressing lack of hip mobility and stabilzation.  Reviewed xray - well preserved joint sapce  Physical therapy: [facility] - [phone]  Active to your tolerance    Follow up as needed. Call direct clinic number [[phone]] at any time with questions or concerns.      NEW WEBSITE HAS LAUNCHED  [email or web address]    We care about your feedback and use it to improve our services. Please leave feedback on the Testimonials & Reviews page.              Follow-ups after your visit        Additional Services     [facility], AND CHIR[facility]RACTIC REFERRAL       **This order will print in the [facility] Scheduling Office**    Physical Therapy, Hand Therapy and Chiropractic Care are available through:    *[facility]  *[facility]  *[facility]    Call one number to schedule at any of the above locations: [phone].    Your provider has referred you to: Physical Therapy at [facility] or [facility]    Indication/Reason for Referral: Right knee pain - hx of medial meniscectomy. Address hip mobility / IR, strength and stability.    Onset of Illness: see chart  Therapy Orders: Evaluate and Treat  Special Programs: None  Special Request: None      Daisha Ashton      Additional Comments for the Therapist or Chiropractor: Formal physical therapy - exercises to include neuromuscular/proprioceptive control and  VMO/gluteus/adductor strengthening. Please also include pain-free closed chain/isometric/isotonic strengthening with use of modalities (including kinesioptaping) as needed/deemed appropriate with home exercise prescription.    Formal physical therapy - exercises to include quadriceps/hamstring strengthening along with hip abductor/adductor/flexor/extensor/internal rotator/external rotator strengthening/stretching with targeting of functional tasks of daily living including gait and balance training with use of modalities as needed with home exercise prescription.    Please be aware that coverage of these services is subject to the terms and limitations of your health insurance plan.  Call member services at your health plan with any benefit or coverage questions.      Please bring the following to your appointment:    *Your personal calendar for scheduling future appointments  *Comfortable clothing                  Future tests that were ordered for you today     Open Future Orders        Priority Expected Expires Ordered    CT Foot Right w/o Contrast Routine  3/23/2019 3/23/2018            Who to contact     If you have questions or need follow up information about today's clinic visit or your schedule please contact [facility] MEDICINE directly at [phone].  Normal or non-critical lab and imaging results will be communicated to you by myseekithart, letter or phone within 4 business days after the clinic has received the results. If you do not hear from us within 7 days, please contact the clinic through [employer] or phone. If you have a critical or abnormal lab result, we will notify you by phone as soon as possible.  Submit refill requests through [employer] or call your pharmacy and they will forward the refill request to us. Please allow 3 business days for your refill to be completed.          Additional Information About Your Visit        [employer] Information     [employer] gives you secure access to your  "electronic health record. If you see a primary care provider, you can also send messages to your care team and make appointments. If you have questions, please call your primary care clinic.  If you do not have a primary care provider, please call [phone] and they will assist you.        Care EveryWhere ID     This is your Care EveryWhere ID. This could be used by other organizations to access your [facility] medical records  [id number]        Your Vitals Were     Height BMI (Body Mass Index)                5' 11\" (1.803 m) 31.1 kg/m2           Blood Pressure from Last 3 Encounters:   03/23/18 121/75   03/23/18 122/70   12/15/17 124/78    Weight from Last 3 Encounters:   03/23/18 223 lb (101.2 kg)   03/23/18 223 lb (101.2 kg)   12/15/17 225 lb (102.1 kg)              We Performed the Following     [facility] REFERRAL        Primary Care Provider Office Phone # Fax #    Juan C Palacio [phone]       [address] [place] [address]        Equal Access to Services     [facility]: Hadii aad ku hadasho Soomaali, waaxda luqadaha, qaybta kaalmada adeomidyada, stephanie roa . So [facility] [phone].    ATENCIÓN: Si habla español, tiene a le disposición servicios gratuitos de asistencia lingüística. [facility] [phone].    We comply with applicable federal civil rights laws and [place] laws. We do not discriminate on the basis of race, color, national origin, age, disability, sex, sexual orientation, or gender identity.            Thank you!     Thank you for choosing [facility] SP[facility]ICINE  for your care. Our goal is always to provide you with excellent care. Hearing back from our patients is one way we can continue to improve our services. Please take a few minutes to complete the written survey that you may receive in the mail after your visit with us. Thank you!             Your Updated Medication List - Protect others around you: " Learn how to safely use, store and throw away your medicines at www.disposemymeds.org.          This list is accurate as of 3/23/18  2:39 PM.  Always use your most recent med list.                   Brand Name Dispense Instructions for use Diagnosis    aspirin 81 MG tablet      1 TABLET DAILY    Routine general medical examination at a health care facility       cetirizine 10 MG tablet    zyrTEC    30 tablet    Take 1 tablet (10 mg) by mouth every evening        MULTIVITAMIN PO      None Entered        Omega-3 Krill Oil 500 MG Caps           simvastatin 20 MG tablet    ZOCOR    90 tablet    TAKE 1 TABLET(20 MG) BY MOUTH AT BEDTIME    Hyperlipidemia LDL goal <130

## 2018-03-23 NOTE — MR AVS SNAPSHOT
After Visit Summary   3/23/2018    Nam May    MRN: [id number]           Patient Information     Date Of Birth          1960        Visit Information        Provider Department      3/23/2018 1:00 PM Milly Navarro DPM, Podiatry/Foot and Ankle Surgery [facility] P[facility]        Today's Diagnoses     Right foot pain    -  1    Primary localized osteoarthrosis of right ankle and foot          Care Instructions    Thank you for choosing [facility]!    DR. NAVARRO'S CLINIC SCHEDULE  MONDAY AM - [place] TUESDAY - [place]   [address] [address] [place] [address] [place] [address]   [phone] / [phone] / [phone]       WEDNESDAY - [place] FRIDAY AM - WOUND CENTER   [address]   [place] [address] [place] [address]   [phone] / [phone]       FRIDAY PM - [place] SCHEDULE SURGERY: [phone]   [address] [address] BILLING QUESTIONS: [phone]   [place] [address] AFTER HOURS: [phone] / [phone] APPOINTMENTS: [phone]     [facility] SCHEDULING  They should be calling you within 24 hours to schedule your scan.  If not, please call the location discussed at your appointment.    1) [facility]:       [phone]      [address] [place] [address]    2) [facility]:      [phone]      [address] [place] [address]    3) [facility]:       [phone]      [facility] [place] [address]    * We will call you with the results. Please allow 24-48 hours for the results to be read and final.    OSTEOARTHRITIS OF THE FOOT & ANKLE  Osteoarthritis is a condition characterized by the breakdown and eventual loss of cartilage in one or more joints. Cartilage (the connective tissue found at the end of the bones in the joints)  protects and cushions the bones during movement. When cartilage deteriorates or is lost, symptoms develop that can restrict one s ability to easily perform daily activities.  Osteoarthritis is also known as degenerative arthritis, reflecting its nature to develop as part of the aging process. As the most common form of arthritis, osteoarthritis affects millions of Americans. Some people refer to osteoarthritis simply as arthritis, even though there are many different types of arthritis.  Osteoarthritis appears at various joints throughout the body, including the hands, feet, spine, hips, and knees. In the foot, the disease most frequently occurs in the big toe, although it is also often found in the midfoot and ankle.  CAUSES  Osteoarthritis is considered a  wear and tear  disease because the cartilage in the joint wears down with repeated stress and use over time. As the cartilage deteriorates and gets thinner, the bones lose their protective covering and eventually may rub together, causing pain and inflammation of the joint.  An injury may also lead to osteoarthritis, although it may take months or years after the injury for the condition to develop. For example, osteoarthritis in the big toe is often caused by kicking or jamming the toe, or by dropping something on the toe. Osteoarthritis in the midfoot is often caused by dropping something on it, or by a sprain or fracture. In the ankle, osteoarthritis is usually caused by a fracture and occasionally by a severe sprain.  Sometimes osteoarthritis develops as a result of abnormal foot mechanics such as flat feet or high arches. A flat foot causes less stability in the ligaments (bands of tissue that connect bones), resulting in excessive strain on the joints, which can cause arthritis. A high arch is rigid and lacks mobility, causing a jamming of joints that creates an increased risk of arthritis.  SYMPTOMS  People with osteoarthritis in the foot or ankle  experience, in varying degrees, one or more of the following: Pain and stiffness in the joint, swelling in or near the joint, or difficulty walking or bending the joint.   Some patients with osteoarthritis also develop a bone spur (a bony protrusion) at the affected joint. Shoe pressure may cause pain at the site of a bone spur, and in some cases blisters or calluses may form over its surface. Bone spurs can also limit the movement of the joint.    DIAGNOSIS  In diagnosing osteoarthritis, the foot and ankle surgeon will examine the foot thoroughly, looking for swelling in the joint, limited mobility, and pain with movement. In some cases, deformity and/or enlargement (spur) of the joint may be noted. X-rays may be ordered to evaluate the extent of the disease.  NON-SURGICAL TREATMENT  To help relieve symptoms, the surgeon may begin treating osteoarthritis with one or more of the following non-surgical approaches:  Oral medications. Nonsteroidal anti-inflammatory drugs (NSAIDs), such as ibuprofen, are often helpful in reducing the inflammation and pain. Occasionally a prescription for a steroid medication is needed to adequately reduce symptoms.   Orthotic devices. Custom orthotic devices (shoe inserts) are often prescribed to provide support to improve the foot s mechanics or cushioning to help minimize pain.   Bracing. Bracing, which restricts motion and supports the joint, can reduce pain during walking and help prevent further deformity.   Immobilization. Protecting the foot from movement by wearing a cast or removable cast-boot may be necessary to allow the inflammation to resolve.   Steroid injections. In some cases, steroid injections are applied to the affected joint to deliver anti-inflammatory medication.   Physical therapy. Exercises to strengthen the muscles, especially when the osteoarthritis occurs in the ankle, may give the patient greater stability and help avoid injury that might worsen the  condition.   DO I NEED SURGERY?  When osteoarthritis has progressed substantially or failed to improve with non-surgical treatment, surgery may be recommended. In advanced cases, surgery may be the only option. The goal of surgery is to decrease pain and improve function. The foot and ankle surgeon will consider a number of factors when selecting the procedure best suited to the patient s condition and lifestyle.      Body Mass Index (BMI)  Many things can cause foot and ankle problems. Foot structure, activity level, foot mechanics and injuries are common causes of pain.  One very important issue that often goes unmentioned, is body weight. Extra weight can cause increased stress on muscles, ligaments, bones and tendons.  Sometimes just a few extra pounds is all it takes to put one over her/his threshold. Without reducing that stress, it can be difficult to alleviate pain. Some people are uncomfortable addressing this issue, but we feel it is important for you to think about it. As Foot &  Ankle specialists, our job is addressing the lower extremity problem and possible causes. Regarding extra body weight, we encourage patients to discuss diet and weight management plans with their primary care doctors. It is this team approach that gives you the best opportunity for pain relief and getting you back on your feet.                  Follow-ups after your visit        Your next 10 appointments already scheduled     Mar 23, 2018  2:00 PM CDT   New Visit with Josh Duong DO   [facility] SPORTS MEDICINE ([facility])    [address] [address] [address]   [phone]              Future tests that were ordered for you today     Open Future Orders        Priority Expected Expires Ordered    CT Foot Right w/o Contrast Routine  3/23/2019 3/23/2018            Who to contact     If you have questions or need follow up information about today's clinic visit or your schedule  "please contact [facility] PODIAT[facility] directly at [phone].  Normal or non-critical lab and imaging results will be communicated to you by MyChart, letter or phone within 4 business days after the clinic has received the results. If you do not hear from us within 7 days, please contact the clinic through [employer] or phone. If you have a critical or abnormal lab result, we will notify you by phone as soon as possible.  Submit refill requests through [employer] or call your pharmacy and they will forward the refill request to us. Please allow 3 business days for your refill to be completed.          Additional Information About Your Visit        E[employer] Information     [employer] gives you secure access to your electronic health record. If you see a primary care provider, you can also send messages to your care team and make appointments. If you have questions, please call your primary care clinic.  If you do not have a primary care provider, please call [phone] and they will assist you.        Care EveryWhere ID     This is your Care EveryWhere ID. This could be used by other organizations to access your [place] medical records  [id number]        Your Vitals Were     Height BMI (Body Mass Index)                5' 11\" (1.803 m) 31.1 kg/m2           Blood Pressure from Last 3 Encounters:   03/23/18 122/70   12/15/17 124/78   08/31/16 128/82    Weight from Last 3 Encounters:   03/23/18 223 lb (101.2 kg)   12/15/17 225 lb (102.1 kg)   08/31/16 224 lb (101.6 kg)               Primary Care Provider Office Phone # Fax #    Juan C Palacio [phone] [address] [facility] [address]        Equal Access to Services     [facility]: Hadii sugar Berry, waaxda luqadaha, qaybta kaalmada brayan, stephanie phelps. So [facility] [phone].    ATENCIÓN: Si habla español, tiene a le disposición servicios gratuitos de asistencia lingüística. Llame al " [phone].    We comply with applicable federal civil rights laws and [place] laws. We do not discriminate on the basis of race, color, national origin, age, disability, sex, sexual orientation, or gender identity.            Thank you!     Thank you for choosing [facility]  for your care. Our goal is always to provide you with excellent care. Hearing back from our patients is one way we can continue to improve our services. Please take a few minutes to complete the written survey that you may receive in the mail after your visit with us. Thank you!             Your Updated Medication List - Protect others around you: Learn how to safely use, store and throw away your medicines at www.disposemymeds.org.          This list is accurate as of 3/23/18  1:32 PM.  Always use your most recent med list.                   Brand Name Dispense Instructions for use Diagnosis    aspirin 81 MG tablet      1 TABLET DAILY    Routine general medical examination at a health care facility       cetirizine 10 MG tablet    zyrTEC    30 tablet    Take 1 tablet (10 mg) by mouth every evening        MULTIVITAMIN PO      None Entered        Omega-3 Krill Oil 500 MG Caps           simvastatin 20 MG tablet    ZOCOR    90 tablet    TAKE 1 TABLET(20 MG) BY MOUTH AT BEDTIME    Hyperlipidemia LDL goal <130

## 2018-03-23 NOTE — PROGRESS NOTES
PATIENT HISTORY:   Nam May is a 57 year old male who presents to clinic for pain to right foot. Notes that he had a fusion 2-4 years ago by an outside provider. Notes pain with walking. 4/10. Has not done anything for this. Would like to know what can be done to stabilize the foot. Notes pain is intermittent.     Review of Systems:  Patient denies fever, chills, rash, wound, stiffness, limping, numbness, weakness, heart burn, blood in stool, chest pain with activity, calf pain when walking, shortness of breath with activity, chronic cough, easy bleeding/bruising, swelling of ankles, excessive thirst, fatigue, depression, anxiety.     PAST MEDICAL HISTORY:   Past Medical History:   Diagnosis Date     ED (erectile dysfunction) 2013    mild - Dr Cotto     Family history of malignant neoplasm of gastrointestinal tract     PGM - colon ca     GERD (gastroesophageal reflux disease)      Hyperlipidemia LDL goal <130 2008     Personal history of colonic polyps 12/99, 12/02, 3/05, 12/08    adenoma, tubular adenoma      Retinal tear of left eye 2/15    VRS     Seasonal allergies summer/fall    Allergic rhinitis H/O AIT - ragweed     Sleep apnea 4/11    severe - CPAP        PAST SURGICAL HISTORY:   Past Surgical History:   Procedure Laterality Date     COLONOSCOPY  12/99, 12/02, 3/05, 12/08, 3/13    Dr Gordillo- due 2018 - tubular adenoma      HC REMOVE TONSILS/ADENOIDS,<11 Y/O  1964    T & A <12y.o.     STRESS ECHO ([facility]RO)  10/08    normal     SURGICAL HISTORY OF -   1/03    rt great toe fusion     SURGICAL HISTORY OF -   9/10    Rt Knee Arthroscopy - Dr Curtis     SURGICAL HISTORY OF -   2/15    VRS - surgery        MEDICATIONS:   Current Outpatient Prescriptions:      simvastatin (ZOCOR) 20 MG tablet, TAKE 1 TABLET(20 MG) BY MOUTH AT BEDTIME, Disp: 90 tablet, Rfl: 3     cetirizine (ZYRTEC) 10 MG tablet, Take 1 tablet (10 mg) by mouth every evening, Disp: 30 tablet, Rfl: 1     Omega-3 Krill Oil 500 MG CAPS, ,  "Disp: , Rfl:      ASPIRIN 81 MG OR TABS, 1 TABLET DAILY, Disp: , Rfl:      MULTIVITAMIN OR, None Entered, Disp: , Rfl:      ALLERGIES:  No Known Allergies     SOCIAL HISTORY:   Social History     Social History     Marital status:      Spouse name: Arabella     Number of children: 2     Years of education: 18     Occupational History      [employer]     Social History Main Topics     Smoking status: Former Smoker     Types: Cigars     Smokeless tobacco: Never Used      Comment: rare cigar <10 per year     Alcohol use 2.4 - 7.2 oz/week     4 - 12 Standard drinks or equivalent per week      Comment: avg 4-12 beers per week     Drug use: No     Sexual activity: Yes     Partners: Female     Other Topics Concern     Caffeine Concern Yes     2 cups qd     Exercise Yes     5-6 days per week     Seat Belt Yes     Parent/Sibling W/ Cabg, Mi Or Angioplasty Before 65f 55m? No     Social History Narrative        FAMILY HISTORY:   Family History   Problem Relation Age of Onset     CANCER Father      lymphoma - stage 1E - ? aggressive type -      Alzheimer Disease Father      CEREBROVASCULAR DISEASE Father       2016     Obesity Mother      Hypertension Mother      C.A.D. Paternal Grandfather       at age 56     Cancer - colorectal Paternal Grandmother      40's     CEREBROVASCULAR DISEASE Maternal Grandfather       at age 79     C.A.D. Maternal Grandfather         EXAM:Vitals: /70  Ht 5' 11\" (1.803 m)  Wt 223 lb (101.2 kg)  BMI 31.1 kg/m2  BMI= Body mass index is 31.1 kg/(m^2).    General appearance: Patient is alert and fully cooperative with history & exam.  No sign of distress is noted during the visit.     Psychiatric: Affect is pleasant & appropriate.  Patient appears motivated to improve health.     Respiratory: Breathing is regular & unlabored while sitting.     HEENT: Hearing is intact to spoken word.  Speech is clear.  No gross evidence of visual impairment that would impact " ambulation.     Dermatologic: Skin is intact to both lower extremities without significant lesions, rash or abrasion.  No paronychia or evidence of soft tissue infection is noted.     Vascular: DP & PT pulses are intact & regular bilaterally.  No significant edema or varicosities noted.  CFT and skin temperature is normal to both lower extremities.     Neurologic: Lower extremity sensation is intact to light touch.  No evidence of weakness or contracture in the lower extremities.  No evidence of neuropathy.     Musculoskeletal: Patient is ambulatory without assistive device or brace.  Pain to the 1st metatarsal phalangeal joint. No range of motion due to previous fusion. Prominent spur medial right 1st interphalangeal joint.       Radiographs:  I personally reviewed the xrays. Arthritic changes noted to right 1st interphalangeal joint.  1st metatarsal phalangeal joint has been fused.      ASSESSMENT:    Right foot pain  Primary localized osteoarthrosis of right ankle and foot     PLAN:  [facility]. Reviewed xrays.  Talked about arthritis and treatments including orthotics, injections, icing, NSAIDS, bracing, physical therapy, compounding pain cream, or surgical intervention.    Pain with pressure to dorsal 1st metatarsal. With previous fusion, want to make sure this area is completely healed. Will order CT and call with results. Don't know if there is much that can be done if fusion is healed.        Milly Navarro DPM, Podiatry/Foot and Ankle Surgery    Weight management plan: Patient was referred to their PCP to discuss a diet and exercise plan.

## 2018-03-23 NOTE — NURSING NOTE
"Chief Complaint   Patient presents with     Foot Problems     R hallux PO total joint fusion 15 years ago, starting to get pain in it       Initial /70  Ht 5' 11\" (1.803 m)  Wt 223 lb (101.2 kg)  BMI 31.1 kg/m2 Estimated body mass index is 31.1 kg/(m^2) as calculated from the following:    Height as of this encounter: 5' 11\" (1.803 m).    Weight as of this encounter: 223 lb (101.2 kg).  Medication Reconciliation: complete    Dylon Pacheco CMA ([facility])  Podiatry / Foot & Ankle Surgery  [facility]    "

## 2018-03-23 NOTE — PROGRESS NOTES
"ASSESSMENT & PLAN    1. Arthralgia of right lower leg    2. Hip impingement syndrome, right      Discussed exam - knee is stable, no fluid today.  Would recommend addressing lack of hip mobility and stabilzation.  Reviewed xray - well preserved joint sapce  Physical therapy: [facility] - [phone]  Active to your tolerance    Follow up as needed. Call direct clinic number [[phone]] at any time with questions or concerns.    -----    SUBJECTIVE  Nam May is a/an 57 year old male who is seen in consultation at the request of  Juan C Palacio M.D. for evaluation of right knee pain. The patient is seen by themselves.    Onset: 8-9 month(s) ago.  Patient feels like his right knee seems to be related to altered gate due to toe/foot issues that he is treating with Dr. Navarro.  Location of Pain: right diffuse knee pain  Rating of Pain at worst: 4/10  Rating of Pain Currently: 0/10  Worsened by: knee fatigues after prolonged walking  Better with: rest  Treatments tried: ibuprofen  Associated symptoms:  Denies any swelling, locking or catching  Orthopedic history: none  Relevant surgical history:  right menisectomy 2011  Patient Social History: works in IT    Patient's past medical, surgical, social, and family histories were reviewed today and no changes are noted.    REVIEW OF SYSTEMS:  10 point ROS is negative other than symptoms noted above in HPI, Past Medical History or as stated below  Constitutional: NEGATIVE for fever, chills, change in weight  Skin: NEGATIVE for worrisome rashes, moles or lesions  GI/: NEGATIVE for bowel or bladder changes  Neuro: NEGATIVE for weakness, dizziness or paresthesias    OBJECTIVE:  /75  Ht 5' 11\" (1.803 m)  Wt 223 lb (101.2 kg)  BMI 31.1 kg/m2   General: healthy, alert and in no distress  HEENT: no scleral icterus or conjunctival erythema  Skin: no suspicious lesions or rash. No jaundice.  CV: no pedal edema  Resp: normal respiratory effort without " conversational dyspnea   Psych: normal mood and affect  Gait: normal steady gait with appropriate coordination and balance  Neuro: Normal light sensory exam of lower extremity  MSK:  RIGHT KNEE  Inspection:    normal alignment  Palpation:    NonTender about the lateral patellar facet, medial patellar facet, lateral joint line and medial joint line. Remainder of bony and ligamentous landmarks are nontender.    No effusion is present    Patellofemoral crepitus is Present  Range of Motion:     00 extension to 1350 flexion  Strength:    Extensor mechanism intact  Special Tests:    Negative: Patellar grind, MCL/valgus stress (0 & 30 deg), LCL/varus stress (0 & 30 deg), Lachman's, posterior drawer, Isi's    RIGHT HIP  Inspection:    No obvious deformity or asymmetry, leg length difference (right longer)  Active Range of Motion:     Flexion full, IR lacking but no painful / ER  full  Strength:    Flexion 5/5 /  Special Tests:    Negative: Logroll, anterior impingement (FADIR)    Independent visualization of the below image:  KNEE STANDING AP BILATERAL SUNRISE BILATERAL LATERAL RIGHT     3/23/2018 2:23 PM      HISTORY: Arthralgia of right lower leg     COMPARISON: Right knee x-ray from 7/18/2010.         IMPRESSION: Joint spaces are well-preserved. No acute fracture or  subluxation.     ASTRID ANAYA MD    Patient's conditions were thoroughly discussed during today's visit with greater than 50% of the visit spent counseling the patient with total time spent face-to-face with the patient being 20 minutes.    Josh Duong, DO [facility]

## 2018-04-13 ENCOUNTER — THERAPY VISIT (OUTPATIENT)
Dept: PHYSICAL THERAPY | Facility: CLINIC | Age: 58
End: 2018-04-13
Payer: COMMERCIAL

## 2018-04-13 ENCOUNTER — HOSPITAL ENCOUNTER (OUTPATIENT)
Dept: CT IMAGING | Facility: CLINIC | Age: 58
Discharge: HOME | End: 2018-04-13
Attending: PODIATRIST | Admitting: PODIATRIST
Payer: COMMERCIAL

## 2018-04-13 DIAGNOSIS — M25.561 RIGHT KNEE PAIN: ICD-10-CM

## 2018-04-13 DIAGNOSIS — M79.671 RIGHT FOOT PAIN: ICD-10-CM

## 2018-04-13 DIAGNOSIS — M25.562 LEFT KNEE PAIN, UNSPECIFIED CHRONICITY: Primary | ICD-10-CM

## 2018-04-13 DIAGNOSIS — M19.071 PRIMARY LOCALIZED OSTEOARTHROSIS OF RIGHT ANKLE AND FOOT: ICD-10-CM

## 2018-04-13 DIAGNOSIS — M25.551 HIP PAIN, RIGHT: ICD-10-CM

## 2018-04-13 PROCEDURE — 97112 NEUROMUSCULAR REEDUCATION: CPT | Mod: GP | Performed by: PHYSICAL THERAPIST

## 2018-04-13 PROCEDURE — 73700 CT LOWER EXTREMITY W/O DYE: CPT | Mod: RT

## 2018-04-13 PROCEDURE — 97161 PT EVAL LOW COMPLEX 20 MIN: CPT | Mod: GP | Performed by: PHYSICAL THERAPIST

## 2018-04-13 PROCEDURE — 97110 THERAPEUTIC EXERCISES: CPT | Mod: GP | Performed by: PHYSICAL THERAPIST

## 2018-04-13 ASSESSMENT — ACTIVITIES OF DAILY LIVING (ADL)
GOING_UP_1_FLIGHT_OF_STAIRS: NO DIFFICULTY AT ALL
STEPPING_UP_AND_DOWN_CURBS: NO DIFFICULTY AT ALL
WALKING_15_MINUTES_OR_GREATER: NO DIFFICULTY AT ALL
HOS_ADL_COUNT: 17
RECREATIONAL_ACTIVITIES: SLIGHT DIFFICULTY
GETTING_INTO_AND_OUT_OF_A_BATHTUB: NO DIFFICULTY AT ALL
SITTING_FOR_15_MINUTES: NO DIFFICULTY AT ALL
DEEP_SQUATTING: NO DIFFICULTY AT ALL
WALKING_UP_STEEP_HILLS: NO DIFFICULTY AT ALL
WALKING_INITIALLY: NO DIFFICULTY AT ALL
HOS_ADL_SCORE(%): 95.59
HOW_WOULD_YOU_RATE_YOUR_CURRENT_LEVEL_OF_FUNCTION_DURING_YOUR_USUAL_ACTIVITIES_OF_DAILY_LIVING_FROM_0_TO_100_WITH_100_BEING_YOUR_LEVEL_OF_FUNCTION_PRIOR_TO_YOUR_HIP_PROBLEM_AND_0_BEING_THE_INABILITY_TO_PERFORM_ANY_OF_YOUR_USUAL_DAILY_ACTIVITIES?: 93
PUTTING_ON_SOCKS_AND_SHOES: NO DIFFICULTY AT ALL
WALKING_APPROXIMATELY_10_MINUTES: NO DIFFICULTY AT ALL
WALKING_DOWN_STEEP_HILLS: SLIGHT DIFFICULTY
HEAVY_WORK: SLIGHT DIFFICULTY
HOS_ADL_ITEM_SCORE_TOTAL: 65
TWISTING/PIVOTING_ON_INVOLVED_LEG: NO DIFFICULTY AT ALL
STANDING_FOR_15_MINUTES: NO DIFFICULTY AT ALL
HOS_ADL_HIGHEST_POTENTIAL_SCORE: 68
LIGHT_TO_MODERATE_WORK: NO DIFFICULTY AT ALL
GOING_DOWN_1_FLIGHT_OF_STAIRS: NO DIFFICULTY AT ALL
ROLLING_OVER_IN_BED: NO DIFFICULTY AT ALL
GETTING_INTO_AND_OUT_OF_AN_AVERAGE_CAR: NO DIFFICULTY AT ALL

## 2018-04-13 NOTE — MR AVS SNAPSHOT
After Visit Summary   4/13/2018    Nam May    MRN: [id number]           Patient Information     Date Of Birth          1960        Visit Information        Provider Department      4/13/2018 3:10 PM Odilia Garcia PT [facility] [place]        Today's Diagnoses     Left knee pain, unspecified chronicity    -  1    Right knee pain        Hip pain, right           Follow-ups after your visit        Your next 10 appointments already scheduled     Apr 27, 2018  3:10 PM CDT   [facility] Extremity with Odilia Garcia PT   [facility] [place] ([facility] [facility])    [address] [facility] [address] [phone]              Who to contact     If you have questions or need follow up information about today's clinic visit or your schedule please contact [facility] directly at [phone].  Normal or non-critical lab and imaging results will be communicated to you by MyChart, letter or phone within 4 business days after the clinic has received the results. If you do not hear from us within 7 days, please contact the clinic through [facility] or phone. If you have a critical or abnormal lab result, we will notify you by phone as soon as possible.  Submit refill requests through [employer] or call your pharmacy and they will forward the refill request to us. Please allow 3 business days for your refill to be completed.          Additional Information About Your Visit        MyChart Information     [facility] gives you secure access to your electronic health record. If you see a primary care provider, you can also send messages to your care team and make appointments. If you have questions, please call your primary care clinic.  If you do not have a primary care provider, please call [phone] and they will assist you.        Care EveryWhere ID     This is your Care EveryWhere ID. This could be used by other organizations to access your [facility]  medical records  [id number]         Blood Pressure from Last 3 Encounters:   03/23/18 121/75   03/23/18 122/70   12/15/17 124/78    Weight from Last 3 Encounters:   03/23/18 101.2 kg (223 lb)   03/23/18 101.2 kg (223 lb)   12/15/17 102.1 kg (225 lb)              We Performed the Following     HC PT EVAL, LOW COMPLEXITY     [place] INITIAL EVAL REPORT     NEUROMUSCULAR RE-EDUCATION     THERAPEUTIC EXERCISES        Primary Care Provider Office Phone # Fax #    Juan C Palacio [phone] [address] [facility] [address]        Equal Access to Services     [facility]: Hadii aad ku hadasho Sotoño, waaxda luqadaha, qaybta kaalmada adeegyada, stephanie roa . So [facility] [phone].    ATENCIÓN: Si habla español, tiene a le disposición servicios gratuitos de asistencia lingüística. Llame al [phone].    We comply with applicable federal civil rights laws and [place] laws. We do not discriminate on the basis of race, color, national origin, age, disability, sex, sexual orientation, or gender identity.            Thank you!     Thank you for choosing [facility]  for your care. Our goal is always to provide you with excellent care. Hearing back from our patients is one way we can continue to improve our services. Please take a few minutes to complete the written survey that you may receive in the mail after your visit with us. Thank you!             Your Updated Medication List - Protect others around you: Learn how to safely use, store and throw away your medicines at www.disposemymeds.org.          This list is accurate as of 4/13/18  4:02 PM.  Always use your most recent med list.                   Brand Name Dispense Instructions for use Diagnosis    aspirin 81 MG tablet      1 TABLET DAILY    Routine general medical examination at a health care facility       cetirizine 10 MG tablet    zyrTEC    30 tablet    Take 1 tablet (10 mg)  by mouth every evening        MULTIVITAMIN PO      None Entered        Omega-3 Krill Oil 500 MG Caps           simvastatin 20 MG tablet    ZOCOR    90 tablet    TAKE 1 TABLET(20 MG) BY MOUTH AT BEDTIME    Hyperlipidemia LDL goal <130

## 2018-04-13 NOTE — PROGRESS NOTES
[facility] Initial Evaluation  Subjective:  Physical Therapy Initial Evaluation   4/13/18   Precautions/Restrictions/MD instructions: PT eval and treat   Therapist Impression:   Pt is a 58 y/o male, with 1-2 year history of right knee pain and right hip pain. Pt presents with pain, decreased ROM/mobility, poor balance and decreased strength. These impairments limit their ability to walk (decline) and walking prolongd distances. Skilled PT services necessary in order to reduce impairments and improve independent function.    Subjective:   Chief Complaint: R knee and hip pain, had fusion (big toe) and gait was compensated (limping)  DOI/onset: 3/13/17 DOS: R big toe fusion; and R knee meniscectomy   Location: anterior knee pain  Quality: dull/ache  Frequency: intermittent  Radiates: none   Pain scale: Rest 0/10 Activity 4/10    Sleeping:  Not affected    Exacerbated by: walking, walking down hill  Relieved by: rest  Progression: same  Previous Treatment: none Effect of prior treatment: n/a    PMH and/or surgical history: R big toe fusion; and R knee meniscectomy   Imaging: Xray (knee- negative)    Occupation: management  Job duties: desk work/ computer; walking a lot   Current HEP/exercise regimen: daily incline treadmill (45 min) Patient's goals: painfree in daily task, painfree walking   Medications: none  General health as reported by patient: good   Return to MD: as needed   Red Flags: none    HPI                    Objective:  KNEE:    Standing Posture: slight varus bilaterally     SL Balance:   L: 30 sec (R hip drop)  R: 30 sec (L hip drop)    Gait: R foot walking on lateral side      Functional:   - Squat: Slight knees over toes      Swelling:    L R   Joint line 38cm 38cm           AROM: (* indicates patient's pain)   PROM:(over pressrue)   L  R L R   Hyperextension   0 0 2 5   Extension   0 0 0 0   Flexion   128 125 130 125     Strength:   L R   HIP     Flex 5/5 5/5   Ext 4/5 4/5   ABd  4-/5 4-/5   KNEE     QS's: very good bilaterally     Hip PROM:     L R   Flexion 120 95   Extension 10 10   IR 0 -10   ER 45 45       Special tests:   L R   Sweep Test - -           System    Physical Exam    General     ROS    Assessment/Plan:    Patient is a 57 year old male with left side knee and hip complaints.    Patient has the following significant findings with corresponding treatment plan.                Diagnosis 1:  R knee and R hip pain  Pain -  hot/cold therapy, manual therapy, splint/taping/bracing/orthotics, self management, education and home program  Decreased ROM/flexibility - manual therapy and therapeutic exercise  Decreased joint mobility - manual therapy and therapeutic exercise  Decreased strength - therapeutic exercise and therapeutic activities  Impaired balance - neuro re-education and therapeutic activities  Decreased proprioception - neuro re-education and therapeutic activities  Impaired gait - gait training  Impaired muscle performance - neuro re-education  Decreased function - therapeutic activities  Impaired posture - neuro re-education  Instability -  Therapeutic Activity  Therapeutic Exercise    Therapy Evaluation Codes:   1) History comprised of:   Personal factors that impact the plan of care:      None.    Comorbidity factors that impact the plan of care are:      None.     Medications impacting care: None.  2) Examination of Body Systems comprised of:   Body structures and functions that impact the plan of care:      Hip and Knee.   Activity limitations that impact the plan of care are:      Stairs, Walking and Working.  3) Clinical presentation characteristics are:   Stable/Uncomplicated.  4) Decision-Making    Low complexity using standardized patient assessment instrument and/or measureable assessment of functional outcome.  Cumulative Therapy Evaluation is: Low complexity.    Previous and current functional limitations:  (See Goal Flow Sheet for this information)    Short  term and Long term goals: (See Goal Flow Sheet for this information)     Communication ability:  Patient appears to be able to clearly communicate and understand verbal and written communication and follow directions correctly.  Treatment Explanation - The following has been discussed with the patient:   RX ordered/plan of care  Anticipated outcomes  Possible risks and side effects  This patient would benefit from PT intervention to resume normal activities.   Rehab potential is good.    Frequency:  1 X week, once daily  Duration:  for 6 weeks  Discharge Plan:  Achieve all LTG.  Independent in home treatment program.  Reach maximal therapeutic benefit.    Please refer to the daily flowsheet for treatment today, total treatment time and time spent performing 1:1 timed codes.

## 2018-05-06 ENCOUNTER — HEALTH MAINTENANCE LETTER (OUTPATIENT)
Age: 58
End: 2018-05-06

## 2018-06-13 ENCOUNTER — TELEPHONE (OUTPATIENT)
Dept: FAMILY MEDICINE | Facility: CLINIC | Age: 58
End: 2018-06-13

## 2018-06-13 DIAGNOSIS — Z12.11 SPECIAL SCREENING FOR MALIGNANT NEOPLASMS, COLON: Primary | ICD-10-CM

## 2018-06-13 NOTE — TELEPHONE ENCOUNTER
Please place order for patient to have colonoscopy - previously done with Dr Gordillo.    Libby Murguia

## 2018-08-03 ENCOUNTER — HOSPITAL ENCOUNTER (OUTPATIENT)
Facility: CLINIC | Age: 58
Discharge: HOME | End: 2018-08-03
Attending: INTERNAL MEDICINE | Admitting: INTERNAL MEDICINE
Payer: COMMERCIAL

## 2018-08-03 VITALS
HEIGHT: 72 IN | OXYGEN SATURATION: 96 % | BODY MASS INDEX: 29.66 KG/M2 | SYSTOLIC BLOOD PRESSURE: 105 MMHG | RESPIRATION RATE: 20 BRPM | DIASTOLIC BLOOD PRESSURE: 72 MMHG | WEIGHT: 219 LBS

## 2018-08-03 PROBLEM — K63.5 COLON POLYP: Status: ACTIVE | Noted: 2018-08-01

## 2018-08-03 LAB — COLONOSCOPY: NORMAL

## 2018-08-03 PROCEDURE — 45385 COLONOSCOPY W/LESION REMOVAL: CPT | Mod: PT | Performed by: INTERNAL MEDICINE

## 2018-08-03 PROCEDURE — 88305 TISSUE EXAM BY PATHOLOGIST: CPT | Mod: 26 | Performed by: INTERNAL MEDICINE

## 2018-08-03 PROCEDURE — 25000128 H RX IP 250 OP 636: Performed by: INTERNAL MEDICINE

## 2018-08-03 PROCEDURE — 88305 TISSUE EXAM BY PATHOLOGIST: CPT | Performed by: INTERNAL MEDICINE

## 2018-08-03 PROCEDURE — G0500 MOD SEDAT ENDO SERVICE >5YRS: HCPCS | Performed by: INTERNAL MEDICINE

## 2018-08-03 RX ORDER — ONDANSETRON 4 MG/1
4 TABLET, ORALLY DISINTEGRATING ORAL EVERY 6 HOURS PRN
Status: DISCONTINUED | OUTPATIENT
Start: 2018-08-03 | End: 2018-08-03 | Stop reason: HOSPADM

## 2018-08-03 RX ORDER — NALOXONE HYDROCHLORIDE 0.4 MG/ML
.1-.4 INJECTION, SOLUTION INTRAMUSCULAR; INTRAVENOUS; SUBCUTANEOUS
Status: DISCONTINUED | OUTPATIENT
Start: 2018-08-03 | End: 2018-08-03 | Stop reason: HOSPADM

## 2018-08-03 RX ORDER — ONDANSETRON 2 MG/ML
4 INJECTION INTRAMUSCULAR; INTRAVENOUS EVERY 6 HOURS PRN
Status: DISCONTINUED | OUTPATIENT
Start: 2018-08-03 | End: 2018-08-03 | Stop reason: HOSPADM

## 2018-08-03 RX ORDER — LIDOCAINE 40 MG/G
CREAM TOPICAL
Status: DISCONTINUED | OUTPATIENT
Start: 2018-08-03 | End: 2018-08-03 | Stop reason: HOSPADM

## 2018-08-03 RX ORDER — ONDANSETRON 2 MG/ML
4 INJECTION INTRAMUSCULAR; INTRAVENOUS
Status: DISCONTINUED | OUTPATIENT
Start: 2018-08-03 | End: 2018-08-03 | Stop reason: HOSPADM

## 2018-08-03 RX ORDER — FENTANYL CITRATE 50 UG/ML
INJECTION, SOLUTION INTRAMUSCULAR; INTRAVENOUS PRN
Status: DISCONTINUED | OUTPATIENT
Start: 2018-08-03 | End: 2018-08-03 | Stop reason: HOSPADM

## 2018-08-03 RX ORDER — FLUMAZENIL 0.1 MG/ML
0.2 INJECTION, SOLUTION INTRAVENOUS
Status: DISCONTINUED | OUTPATIENT
Start: 2018-08-03 | End: 2018-08-03 | Stop reason: HOSPADM

## 2018-08-03 NOTE — DISCHARGE INSTRUCTIONS
Understanding Colon and Rectal Polyps     The colon has a smooth lining composed of millions of cells.     The colon (also called the large intestine) is a muscular tube that forms the last part of the digestive tract. It absorbs water and stores food waste. The colon is about 4 to 6 feet long. The rectum is the last 6 inches of the colon. The colon and rectum have a smooth lining composed of millions of cells. Changes in these cells can lead to growths in the colon that can become cancerous and should be removed.     When the Colon Lining Changes  Changes that occur in the cells that line the colon or rectum can lead to growths called polyps. Over a period of years, polyps can turn cancerous. Removing polyps early may prevent cancer from ever forming.      Polyps  Polyps are fleshy clumps of tissue that form on the lining of the colon or rectum. Small polyps are usually benign (not cancerous). However, over time, cells in a polyp can change and become cancerous. The larger a polyp grows, the more likely this is to happen. Also, certain types of polyps known as adenomatous polyps are considered premalignant. This means that they will almost always become cancerous if they re not removed.          Cancer  Almost all colorectal cancers start when polyp cells begin growing abnormally. As a cancerous tumor grows, it may involve more and more of the colon or rectum. In time, cancer can also grow beyond the colon or rectum and spread to nearby organs or to glands called lymph nodes. The cells can also travel to other parts of the body. This is known as metastasis. The earlier a cancerous tumor is removed, the better the chance of preventing its spread.        9240-4927 [facility], [address] [place] [address], [place] [address]. All rights reserved. This information is not intended as a substitute for professional medical care. Always follow your healthcare professional's instructions.

## 2018-08-03 NOTE — H&P
Pre-Endoscopy History and Physical     Nam May MRN# [id number]   YOB: 1960 Age: 57 year old     Date of Procedure: 8/3/2018  Primary care provider: Juan C Palacio  Type of Endoscopy: Colonoscopy with possible biopsy, possible polypectomy  Reason for Procedure: screen  Type of Anesthesia Anticipated: Conscious Sedation    HPI:    Nam is a 57 year old male who will be undergoing the above procedure.      A history and physical has been performed. The patient's medications and allergies have been reviewed. The risks and benefits of the procedure and the sedation options and risks were discussed with the patient.  All questions were answered and informed consent was obtained.      He denies a personal or family history of anesthesia complications or bleeding disorders.     Patient Active Problem List   Diagnosis     History of colonic polyps     GERD (gastroesophageal reflux disease)     Hyperlipidemia LDL goal <130     Seasonal allergies     Sleep apnea     ED (erectile dysfunction)     Retinal tear of left eye     Right knee pain     Hip pain, right        Past Medical History:   Diagnosis Date     ED (erectile dysfunction) 2013    mild - Dr Cotto     Family history of malignant neoplasm of gastrointestinal tract     PGM - colon ca     GERD (gastroesophageal reflux disease)      Hyperlipidemia LDL goal <130 2008     Personal history of colonic polyps 12/99, 12/02, 3/05, 12/08    adenoma, tubular adenoma      Retinal tear of left eye 2/15    VRS     Seasonal allergies summer/fall    Allergic rhinitis H/O AIT - ragweed     Sleep apnea 4/11    severe - CPAP        Past Surgical History:   Procedure Laterality Date     COLONOSCOPY  12/99, 12/02, 3/05, 12/08, 3/13    Dr Gordillo- due 2018 - tubular adenoma      HC REMOVE TONSILS/ADENOIDS,<13 Y/O  1964    T & A <12y.o.     STRESS ECHO ([facility]RO)  10/08    normal     SURGICAL HISTORY OF -   1/03    rt great toe fusion     SURGICAL HISTORY OF -   9/10    Rt  Knee Arthroscopy - Dr Curtis     SURGICAL HISTORY OF -   2/15    VRS - surgery       Social History   Substance Use Topics     Smoking status: Former Smoker     Types: Cigars     Smokeless tobacco: Never Used      Comment: rare cigar <10 per year     Alcohol use 2.4 - 7.2 oz/week     4 - 12 Standard drinks or equivalent per week      Comment: avg 4-12 beers per week       Family History   Problem Relation Age of Onset     Cancer Father      lymphoma - stage 1E - ? aggressive type -      Alzheimer Disease Father      Cerebrovascular Disease Father       2016     Obesity Mother      Hypertension Mother      C.A.D. Paternal Grandfather       at age 56     Cancer - colorectal Paternal Grandmother      40's     Cerebrovascular Disease Maternal Grandfather       at age 79     C.A.D. Maternal Grandfather        Prior to Admission medications    Medication Sig Start Date End Date Taking? Authorizing Provider   ASPIRIN 81 MG OR TABS 1 TABLET DAILY   Yes Reported, Patient   cetirizine (ZYRTEC) 10 MG tablet Take 1 tablet (10 mg) by mouth every evening 12/15/17  Yes Juan C Palacio MD   MULTIVITAMIN OR None Entered   Yes Reported, Patient   Omega-3 Krill Oil 500 MG CAPS  12/15/17  Yes Juan C Palacio MD   simvastatin (ZOCOR) 20 MG tablet TAKE 1 TABLET(20 MG) BY MOUTH AT BEDTIME 17  Yes Juan C Palacio MD       No Known Allergies     REVIEW OF SYSTEMS:   5 point ROS negative except as noted above in HPI, including Gen., Resp., CV, GI &  system review.    PHYSICAL EXAM:   /87  Resp 12  Ht 1.829 m (6')  Wt 99.3 kg (219 lb)  SpO2 97%  BMI 29.7 kg/m2 Estimated body mass index is 29.7 kg/(m^2) as calculated from the following:    Height as of this encounter: 1.829 m (6').    Weight as of this encounter: 99.3 kg (219 lb).   GENERAL APPEARANCE: alert, and oriented  MENTAL STATUS: alert  AIRWAY EXAM: Mallampatti Class I (visualization of the soft palate, fauces, uvula, anterior and posterior  pillars)  RESP: lungs clear to auscultation - no rales, rhonchi or wheezes  CV: regular rates and rhythm  DIAGNOSTICS:    Not indicated    IMPRESSION   ASA Class 1 - Healthy patient, no medical problems    PLAN:   Plan for Colonoscopy with possible biopsy, possible polypectomy. We discussed the risks, benefits and alternatives and the patient wished to proceed.    The above has been forwarded to the consulting provider.      Signed Electronically by: Yassine Gordillo  August 3, 2018

## 2018-08-03 NOTE — IP AVS SNAPSHOT
MRN:[id number]                      After Visit Summary   8/3/2018    Nam May    MRN: [id number]           Thank you!     Thank you for choosing [facility] for your care. Our goal is always to provide you with excellent care. Hearing back from our patients is one way we can continue to improve our services. Please take a few minutes to complete the written survey that you may receive in the mail after you visit. If you would like to speak to someone directly about your visit please contact Patient Relations at [phone]. Thank you!          Patient Information     Date Of Birth          1960        About your hospital stay     You were admitted on:  August 3, 2018 You last received care in the:  [facility]    You were discharged on:  August 3, 2018       Who to Call     For medical emergencies, please call 911.  For non-urgent questions about your medical care, please call your primary care provider or clinic, [phone]  For questions related to your surgery, please call your surgery clinic        Attending Provider     Provider Specialty    Yassine Gordillo MD Gastroenterology       Primary Care Provider Office Phone # Fax #    Juan C Palacio [phone]      Further instructions from your care team         Understanding Colon and Rectal Polyps     The colon has a smooth lining composed of millions of cells.     The colon (also called the large intestine) is a muscular tube that forms the last part of the digestive tract. It absorbs water and stores food waste. The colon is about 4 to 6 feet long. The rectum is the last 6 inches of the colon. The colon and rectum have a smooth lining composed of millions of cells. Changes in these cells can lead to growths in the colon that can become cancerous and should be removed.     When the Colon Lining Changes  Changes that occur in the cells that line the colon or rectum can lead to growths  called polyps. Over a period of years, polyps can turn cancerous. Removing polyps early may prevent cancer from ever forming.      Polyps  Polyps are fleshy clumps of tissue that form on the lining of the colon or rectum. Small polyps are usually benign (not cancerous). However, over time, cells in a polyp can change and become cancerous. The larger a polyp grows, the more likely this is to happen. Also, certain types of polyps known as adenomatous polyps are considered premalignant. This means that they will almost always become cancerous if they re not removed.          Cancer  Almost all colorectal cancers start when polyp cells begin growing abnormally. As a cancerous tumor grows, it may involve more and more of the colon or rectum. In time, cancer can also grow beyond the colon or rectum and spread to nearby organs or to glands called lymph nodes. The cells can also travel to other parts of the body. This is known as metastasis. The earlier a cancerous tumor is removed, the better the chance of preventing its spread.        4229-2592 [address], [place] [address]. All rights reserved. This information is not intended as a substitute for professional medical care. Always follow your healthcare professional's instructions.    Pending Results     No orders found from 8/1/2018 to 8/4/2018.            Admission Information     Date & Time Provider Department Dept. Phone    8/3/2018 Yassine Gordillo MD [facility] [phone]      Your Vitals Were     Blood Pressure Respirations Height Weight Pulse Oximetry BMI (Body Mass Index)    151/94 26 1.829 m (6') 99.3 kg (219 lb) 95% 29.7 kg/m2      MyChart Information     [employer] gives you secure access to your electronic health record. If you see a primary care provider, you can also send messages to your care team and make appointments. If you have questions, please call your primary care clinic.  If you do not have a primary  care provider, please call [phone] and they will assist you.        Care EveryWhere ID     This is your Care EveryWhere ID. This could be used by other organizations to access your [facility] medical records  [id number]        Equal Access to Services     [facility] : Hadii aad ku hadpollyjs Soconradali, waaxda luqadaha, qaybta kaalmada adefran, stephanie william samuelpatricia martins forrest janina phelps. So [facility] [phone].    ATENCIÓN: Si habla español, tiene a le disposición servicios gratuitos de asistencia lingüística. Llame al [phone].    We comply with applicable federal civil rights laws and [place] laws. We do not discriminate on the basis of race, color, national origin, age, disability, sex, sexual orientation, or gender identity.               Review of your medicines      UNREVIEWED medicines. Ask your doctor about these medicines        Dose / Directions    aspirin 81 MG tablet   Used for:  Routine general medical examination at a health care facility        1 TABLET DAILY   Refills:  0       cetirizine 10 MG tablet   Commonly known as:  zyrTEC        Dose:  10 mg   Take 1 tablet (10 mg) by mouth every evening   Quantity:  30 tablet   Refills:  1       MULTIVITAMIN PO        None Entered   Refills:  0       Omega-3 Krill Oil 500 MG Caps        Refills:  0       simvastatin 20 MG tablet   Commonly known as:  ZOCOR   Used for:  Hyperlipidemia LDL goal <130        TAKE 1 TABLET(20 MG) BY MOUTH AT BEDTIME   Quantity:  90 tablet   Refills:  3                Protect others around you: Learn how to safely use, store and throw away your medicines at www.disposemymeds.org.             Medication List: This is a list of all your medications and when to take them. Check marks below indicate your daily home schedule. Keep this list as a reference.      Medications           Morning Afternoon Evening Bedtime As Needed    aspirin 81 MG tablet   1 TABLET DAILY                                cetirizine 10 MG tablet    Commonly known as:  zyrTEC   Take 1 tablet (10 mg) by mouth every evening                                MULTIVITAMIN PO   None Entered                                Omega-3 Krill Oil 500 MG Caps                                simvastatin 20 MG tablet   Commonly known as:  ZOCOR   TAKE 1 TABLET(20 MG) BY MOUTH AT BEDTIME

## 2018-08-03 NOTE — LETTER
July 13, 2018      Nam May  [id number] [facility] [id number]      Thank you for choosing [facility]. You are scheduled for the following service.     Date:  Friday August 3, 2018             Procedure:  COLONOSCOPY  Doctor:        Dr Gordillo   Arrival Time:  0800  *Check in at Emergency/Endoscopy desk*  Procedure Time:  0830    Location:   [facility]        Endoscopy Department, First Floor (Enter through ER Doors) *        [address] [place], [place] [id number] [phone] or [phone] () to reschedule      MIRALAX -GATORADE  PREP  Colonoscopy is the most accurate test to detect colon polyps and colon cancer; and the only test where polyps can be removed. During this procedure, a doctor examines the lining of your large intestine and rectum through a flexible tube.           Transportation  Arrange for a ride for the day of your procedure with a responsible adult.  A taxi ride is not an option unless you are accompanied by a responsible adult. If you fail to arrange transportation with a responsible adult, your procedure will be cancelled and rescheduled.    Purchase the  following supplies at your local pharmacy:  - 2 (two) bisacodyl tablets: each tablet contains 5 mg.  (Dulcolax  laxative NOT Dulcolax  stool softener)   - 1 (one) 8.3 oz bottle of Polyethylene Glycol (PEG) 3350 Powder   (MiraLAX , Smooth LAX , ClearLAX  or equivalent)  - 64 oz Gatorade    Regular Gatorade, Gatorade G2 , Powerade , Powerade Zero  or Pedialyte  is acceptable. Red colored flavors are not allowed; all other colors (yellow, green, orange, purple and blue) are okay. It is also okay to buy two 2.12 oz packets of powdered Gatorade that can be mixed with water to a total volume of 64 oz of liquid.  - 1 (one) 10 oz bottle of Magnesium Citrate (Red colored flavors are not allowed)  It is also okay for you to use a 0.5 oz package of powdered  magnesium citrate (17 g) mixed with 10 oz of water.    PREPARATION FOR COLONOSCOPY    7 days before:    Discontinue fiber supplements and medications containing iron. This includes Metamucil  and Fibercon ; and multivitamins with iron.  3 days before:    Begin a low-fiber diet. A low-fiber diet helps making the cleanout more effective.     Examples of a low-fiber diet include (but are not limited to): white bread, white rice, pasta, crackers, fish, chicken, eggs, ground beef, creamy peanut butter, cooked/steamed/boiled vegetables, canned fruit, bananas, melons, milk, plain yogurt cheese, salad dressing and other condiments.     The following are not allowed on a low-fiber diet: seeds, nuts, popcorn, bran, whole wheat, corn, quinoa, raw fruits and vegetables, berries and dried fruit, beans and lentils.    For additional details on low-fiber diet, please refer to the table on the last page.  2 days before:    Continue the low-fiber diet.     Drink at least 8 glasses of water throughout the day.     Stop eating solid foods at 11:45 pm.  1 day before:    In the morning: begin a clear liquid diet (liquids you can see through).     Examples of a clear liquid diet include: water, clear broth or bouillon, Gatorade, Pedialyte or Powerade, carbonated and non-carbonated soft drinks (Sprite , 7-Up , ginger ale), strained fruit juices without pulp (apple, white grape, white cranberry), Jell-O  and popsicles.     The following are not allowed on a clear liquid diet: red liquids, alcoholic beverages, dairy products (milk, creamer, and yogurt), protein shakes, creamy broths, juice with pulp and chewing tobacco.    At noon: take 2 (two) bisacodyl tablets     At 4 (and no later than 6pm): start drinking the Miralax-Gatorade preparation (8.3 oz of Miralax mixed with 64 oz of Gatorade in a large pitcher). Drink 1(one) 8 oz glass every 15 minutes thereafter, until the mixture is gone.    COLON CLEANSING TIPS: drink adequate amounts of  fluids before and after your colon cleansing to prevent dehydration. Stay near a toilet because you will have diarrhea. Even if you are sitting on the toilet, continue to drink the cleansing solution every 15 minutes. If you feel nauseous or vomit, rinse your mouth with water, take a 15 to 30-minute-break and then continue drinking the solution. You will be uncomfortable until the stool has flushed from your colon (in about 2 to 4 hours). You may feel chilled.              Day of your procedure  You may take all of your morning medications including blood pressure medications, blood thinners (if you have not been instructed to stop these by our office), methadone, anti-seizure medications with sips of water 3 hours prior to your procedure or earlier. Do not take insulin or vitamins prior to your procedure. Continue the clear liquid diet.   4 hours prior: drink 10 oz of magnesium citrate. It may be easier to drink it with a straw.    STOP consuming all liquids after that.     Do not take anything by mouth during this time.     Allow extra time to travel to your procedure as you may need to stop and use a restroom along the way.  You are ready for the procedure, if you followed all instructions and your stool is no longer formed, but clear or yellow liquid. If you are unsure whether your colon is clean, please call our office at [phone] before you leave for your appointment.  Bring the following to your procedure:  - Insurance Card/Photo ID.   - List of current medications including over-the-counter medications and supplements.   - Your rescue inhaler if you currently use one to control asthma.      Canceling or rescheduling your appointment:   If you must cancel or reschedule your appointment, please call [phone] as soon as possible.      COLONOSCOPY PRE-PROCEDURE CHECKLIST  If you have diabetes, ask your regular doctor for diet and medication restrictions.  If you take an anticoagulant or anti-platelet  medication (such as Coumadin , Lovenox , Pradaxa , Xarelto , Eliquis , etc.), please call your primary doctor for advice on holding this medication.  If you take aspirin you may continue to do so.  If you are or may be pregnant, please discuss the risks and benefits of this procedure with your doctor.          What happens during a colonoscopy?    Plan to spend up to two hours, starting at registration time, at the endoscopy center the day of your procedure. The colonoscopy takes an average of 15 to 30 minutes. Recovery time is about 30 minutes.    Before the exam:    You will change into a gown.    Your medical history and medication list will be reviewed with you, unless that has been done over the phone prior to the procedure.     A nurse will insert an intravenous (IV) line into your hand or arm.    The doctor will meet with you and will give you a consent form to sign.    During the exam:     Medicine will be given through the IV line to help you relax.     Your heart rate and oxygen levels will be monitored. If your blood pressure is low, you may be given fluids through the IV line.     The doctor will insert a flexible hollow tube, called a colonoscope, into your rectum. The scope will be advanced slowly through the large intestine (colon).    You may have a feeling of fullness or pressure.     If an abnormal tissue or a polyp is found, the doctor may remove it through the endoscope for closer examination, or biopsy. Tissue removal is painless    After the exam:           Any tissue samples removed during the exam will be sent to a lab for evaluation. It may take 5-7 working days for you to be notified of the results.     A nurse will provide you with complete discharge instructions before you leave the endoscopy center. Be sure to ask the nurse for specific instructions if you take blood thinners such as Aspirin, Coumadin or Plavix.     The doctor will prepare a full report for you and for the physician who  referred you for the procedure.     Your doctor will talk with you about the initial results of your exam.      Medication given during the exam will prohibit you from driving for the rest of the day.     Following the exam, you may resume your normal diet. Your first meal should be light, no greasy foods. Avoid alcohol until the next day.     You may resume your regular activities the day after the procedure.     LOW-FIBER DIET    Foods RECOMMENDED Foods to AVOID   Breads, Cereal, Rice and Pasta:   White bread, rolls, biscuits, croissant and aaron toast.   Waffles, Gambian toast and pancakes.   White rice, noodles, pasta, macaroni and peeled cooked potatoes.   Plain crackers and saltines.   Cooked cereals: farina, cream of rice.   Cold cereals: Puffed Rice , Rice Krispies , Corn Flakes  and Special K    Breads, Cereal, Rice and Pasta:   Breads or rolls with nuts, seeds or fruit.   Whole wheat, pumpernickel, rye breads and cornbread.   Potatoes with skin, brown or wild rice, and kasha (buckwheat).     Vegetables:   Tender cooked and canned vegetables without seeds: carrots, asparagus tips, green or wax beans, pumpkin, spinach, lima beans. Vegetables:   Raw or steamed vegetables.   Vegetables with seeds.   Sauerkraut.   Winter squash, peas, broccoli, Brussel sprouts, cabbage, onions, cauliflower, baked beans, peas and corn.   Fruits:   Strained fruit juice.   Canned fruit, except pineapple.   Ripe bananas and melon. Fruits:   Prunes and prune juice.   Raw fruits.   Dried fruits: figs, dates and raisins.   Milk/Dairy:   Milk: plain or flavored.   Yogurt, custard and ice cream.   Cheese and cottage cheese Milk/Dairy:     Meat and other proteins:   ground, well-cooked tender beef, lamb, ham, veal, pork, fish, poultry and organ meats.   Eggs.   Peanut butter without nuts. Meat and other proteins:   Tough, fibrous meats with gristle.   Dry beans, peas and lentils.   Peanut butter with nuts.   Tofu.   Fats, Snack, Sweets,  Condiments and Beverages:   Margarine, butter, oils, mayonnaise, sour cream and salad dressing, plain gravy.   Sugar, hard candy, clear jelly, honey and syrup.   Spices, cooked herbs, bouillon, broth and soups made with allowed vegetable, ketchup and mustard.   Coffee, tea and carbonated drinks.   Plain cakes, cookies and pretzels.   Gelatin, plain puddings, custard, ice cream, sherbet and popsicles. Fats, Snack, Sweets, Condiments and Beverages:   Nuts, seeds and coconut.   Jam, marmalade and preserves.   Pickles, olives, relish and horseradish.   All desserts containing nuts, seeds, dried fruit and coconut; or made from whole grains or bran.   Candy made with nuts or seeds.   Popcorn.                     DIRECTIONS TO THE ENDOSCOPY DEPARTMENT     From the north ([facility])  Take [address], exit on [address] [address]. Get into the left hand jayy, turn left (east), go one-half mile to [address] and turn left. Go north to the first stoplight, take a right on [address] and follow it to the Emergency entrance.    From the south ([facility])  Take 35N to the [address]E split and exit on [address] [address]. On [address] [address], turn left (west) to [address]. Turn right (north) on [address]. Go north to the first stoplight, take a right on [address] and follow it to the Emergency entrance.    From the east via 35E ([facility])  Take [address] south to [address] [address] exit. Turn right on [address] [address]. Go west to [address]. Turn right (north) on [address]. Go to the first stoplight, take a right and follow on [address] to the Emergency entrance.    From the east via [address] ([facility])  Take [address] to [address]. Turn left (south) on [address] to [address]. Turn left (east) on [address] and follow it to the Emergency entrance.    From the west via [address] ([place], [place])  Take [address] east to [address].  Turn right (south) on [address] to [employer]. Turn left (east) on [address] and follow it to the Emergency entrance.

## 2018-08-06 LAB — COPATH REPORT: NORMAL

## 2018-08-07 PROBLEM — D12.6 TUBULAR ADENOMA OF COLON: Status: ACTIVE | Noted: 2018-08-01

## 2018-11-24 DIAGNOSIS — E78.5 HYPERLIPIDEMIA LDL GOAL <130: ICD-10-CM

## 2018-11-26 RX ORDER — SIMVASTATIN 20 MG
TABLET ORAL
Qty: 90 TABLET | Refills: 0 | Status: SHIPPED | OUTPATIENT
Start: 2018-11-26 | End: 2019-02-23

## 2018-11-26 NOTE — TELEPHONE ENCOUNTER
"Requested Prescriptions   Pending Prescriptions Disp Refills     simvastatin (ZOCOR) 20 MG tablet [Pharmacy Med Name: SIMVASTATIN 20MG TABLETS] 90 tablet 0      Last Written Prescription Date:  12.18.17  Last Fill Quantity: 90,  # refills: 3   Last Office Visit: 12/15/2017   Future Office Visit:      Sig: TAKE 1 TABLET(20 MG) BY MOUTH AT BEDTIME    Statins Protocol Passed    11/24/2018 12:10 PM       Passed - LDL on file in past 12 months    Recent Labs   Lab Test  12/15/17   0814   LDL  124*            Passed - No abnormal creatine kinase in past 12 months    Recent Labs   Lab Test  12/15/17   0814   CKT  136               Passed - Recent (12 mo) or future (30 days) visit within the authorizing provider's specialty    Patient had office visit in the last 12 months or has a visit in the next 30 days with authorizing provider or within the authorizing provider's specialty.  See \"Patient Info\" tab in inbasket, or \"Choose Columns\" in Meds & Orders section of the refill encounter.             Passed - Patient is age 18 or older          "

## 2018-11-26 NOTE — TELEPHONE ENCOUNTER
Medication is being filled for 1 time refill only due to:  Patient needs to be seen because due for phx for further refills.   Tegan Doty RN  [facility]

## 2019-02-23 DIAGNOSIS — E78.5 HYPERLIPIDEMIA LDL GOAL <130: ICD-10-CM

## 2019-02-25 NOTE — TELEPHONE ENCOUNTER
"Requested Prescriptions   Pending Prescriptions Disp Refills     simvastatin (ZOCOR) 20 MG tablet [Pharmacy Med Name: SIMVASTATIN 20MG TABLETS] 90 tablet 0      Last Written Prescription Date:  11/26/2018  Last Fill Quantity: 90,  # refills: 0   Last office visit: 12/15/2017 with prescribing provider:       Next 5 appointments (look out 90 days)    Mar 25, 2019  1:40 PM CDT  [facility]YS[facility] C [facility]acio [facility] [address] [place] [id number] [phone]          Sig: TAKE 1 TABLET BY MOUTH AT BEDTIME    Statins Protocol Failed - 2/25/2019  9:07 AM       Failed - LDL on file in past 12 months    Recent Labs   Lab Test 12/15/17  0814   *            Passed - No abnormal creatine kinase in past 12 months    Recent Labs   Lab Test 12/15/17  0814                  Passed - Recent (12 mo) or future (30 days) visit within the authorizing provider's specialty    Patient had office visit in the last 12 months or has a visit in the next 30 days with authorizing provider or within the authorizing provider's specialty.  See \"Patient Info\" tab in inbasket, or \"Choose Columns\" in Meds & Orders section of the refill encounter.             Passed - Medication is active on med list       Passed - Patient is age 18 or older          "

## 2019-02-26 RX ORDER — SIMVASTATIN 20 MG
TABLET ORAL
Qty: 90 TABLET | Refills: 0 | Status: SHIPPED | OUTPATIENT
Start: 2019-02-26 | End: 2019-03-25

## 2019-02-26 NOTE — TELEPHONE ENCOUNTER
Routing refill request to provider for review/approval because:  Labs not current:  Lipid - patient does have OV scheduled for 3/25/19    Mayelin Max RN   [facility] - Triage

## 2019-03-25 ENCOUNTER — OFFICE VISIT (OUTPATIENT)
Dept: FAMILY MEDICINE | Facility: CLINIC | Age: 59
End: 2019-03-25
Payer: COMMERCIAL

## 2019-03-25 VITALS
OXYGEN SATURATION: 97 % | SYSTOLIC BLOOD PRESSURE: 134 MMHG | TEMPERATURE: 97.7 F | HEIGHT: 72 IN | HEART RATE: 69 BPM | WEIGHT: 227 LBS | BODY MASS INDEX: 30.75 KG/M2 | DIASTOLIC BLOOD PRESSURE: 82 MMHG

## 2019-03-25 DIAGNOSIS — Z11.4 SCREENING FOR HIV (HUMAN IMMUNODEFICIENCY VIRUS): ICD-10-CM

## 2019-03-25 DIAGNOSIS — E78.5 HYPERLIPIDEMIA LDL GOAL <130: ICD-10-CM

## 2019-03-25 DIAGNOSIS — K21.9 GASTROESOPHAGEAL REFLUX DISEASE WITHOUT ESOPHAGITIS: ICD-10-CM

## 2019-03-25 DIAGNOSIS — G47.33 OBSTRUCTIVE SLEEP APNEA SYNDROME: ICD-10-CM

## 2019-03-25 DIAGNOSIS — Z00.00 ROUTINE GENERAL MEDICAL EXAMINATION AT A HEALTH CARE FACILITY: Primary | ICD-10-CM

## 2019-03-25 DIAGNOSIS — Z91.09 ENVIRONMENTAL ALLERGIES: ICD-10-CM

## 2019-03-25 DIAGNOSIS — Z51.81 MEDICATION MONITORING ENCOUNTER: ICD-10-CM

## 2019-03-25 DIAGNOSIS — Z12.5 SCREENING FOR PROSTATE CANCER: ICD-10-CM

## 2019-03-25 DIAGNOSIS — Z12.11 SCREEN FOR COLON CANCER: ICD-10-CM

## 2019-03-25 DIAGNOSIS — D12.6 TUBULAR ADENOMA OF COLON: ICD-10-CM

## 2019-03-25 LAB
ALBUMIN SERPL-MCNC: 4.3 G/DL (ref 3.4–5)
ALBUMIN UR-MCNC: NEGATIVE MG/DL
ALP SERPL-CCNC: 65 U/L (ref 40–150)
ALT SERPL W P-5'-P-CCNC: 35 U/L (ref 0–70)
ANION GAP SERPL CALCULATED.3IONS-SCNC: 6 MMOL/L (ref 3–14)
APPEARANCE UR: CLEAR
AST SERPL W P-5'-P-CCNC: 24 U/L (ref 0–45)
BILIRUB SERPL-MCNC: 1.2 MG/DL (ref 0.2–1.3)
BILIRUB UR QL STRIP: NEGATIVE
BUN SERPL-MCNC: 22 MG/DL (ref 7–30)
CALCIUM SERPL-MCNC: 9.3 MG/DL (ref 8.5–10.1)
CHLORIDE SERPL-SCNC: 108 MMOL/L (ref 94–109)
CHOLEST SERPL-MCNC: 190 MG/DL
CK SERPL-CCNC: 152 U/L (ref 30–300)
CO2 SERPL-SCNC: 27 MMOL/L (ref 20–32)
COLOR UR AUTO: YELLOW
CREAT SERPL-MCNC: 0.91 MG/DL (ref 0.66–1.25)
ERYTHROCYTE [DISTWIDTH] IN BLOOD BY AUTOMATED COUNT: 12.9 % (ref 10–15)
GFR SERPL CREATININE-BSD FRML MDRD: >90 ML/MIN/{1.73_M2}
GLUCOSE SERPL-MCNC: 87 MG/DL (ref 70–99)
GLUCOSE UR STRIP-MCNC: NEGATIVE MG/DL
HCT VFR BLD AUTO: 45.5 % (ref 40–53)
HDLC SERPL-MCNC: 47 MG/DL
HGB BLD-MCNC: 15.8 G/DL (ref 13.3–17.7)
HGB UR QL STRIP: NEGATIVE
KETONES UR STRIP-MCNC: NEGATIVE MG/DL
LDLC SERPL CALC-MCNC: 119 MG/DL
LEUKOCYTE ESTERASE UR QL STRIP: NEGATIVE
MCH RBC QN AUTO: 30.9 PG (ref 26.5–33)
MCHC RBC AUTO-ENTMCNC: 34.7 G/DL (ref 31.5–36.5)
MCV RBC AUTO: 89 FL (ref 78–100)
NITRATE UR QL: NEGATIVE
NONHDLC SERPL-MCNC: 143 MG/DL
PH UR STRIP: 6.5 PH (ref 5–7)
PLATELET # BLD AUTO: 268 10E9/L (ref 150–450)
POTASSIUM SERPL-SCNC: 4.2 MMOL/L (ref 3.4–5.3)
PROT SERPL-MCNC: 7.5 G/DL (ref 6.8–8.8)
PSA SERPL-ACNC: 1.17 UG/L (ref 0–4)
RBC # BLD AUTO: 5.11 10E12/L (ref 4.4–5.9)
SODIUM SERPL-SCNC: 141 MMOL/L (ref 133–144)
SOURCE: NORMAL
SP GR UR STRIP: 1.02 (ref 1–1.03)
TRIGL SERPL-MCNC: 120 MG/DL
TSH SERPL DL<=0.005 MIU/L-ACNC: 1.81 MU/L (ref 0.4–4)
UROBILINOGEN UR STRIP-ACNC: 0.2 EU/DL (ref 0.2–1)
WBC # BLD AUTO: 8.9 10E9/L (ref 4–11)

## 2019-03-25 PROCEDURE — 36415 COLL VENOUS BLD VENIPUNCTURE: CPT | Performed by: FAMILY MEDICINE

## 2019-03-25 PROCEDURE — 99396 PREV VISIT EST AGE 40-64: CPT | Performed by: FAMILY MEDICINE

## 2019-03-25 PROCEDURE — 84443 ASSAY THYROID STIM HORMONE: CPT | Performed by: FAMILY MEDICINE

## 2019-03-25 PROCEDURE — G0103 PSA SCREENING: HCPCS | Performed by: FAMILY MEDICINE

## 2019-03-25 PROCEDURE — 80061 LIPID PANEL: CPT | Performed by: FAMILY MEDICINE

## 2019-03-25 PROCEDURE — 85027 COMPLETE CBC AUTOMATED: CPT | Performed by: FAMILY MEDICINE

## 2019-03-25 PROCEDURE — 82550 ASSAY OF CK (CPK): CPT | Performed by: FAMILY MEDICINE

## 2019-03-25 PROCEDURE — 87389 HIV-1 AG W/HIV-1&-2 AB AG IA: CPT | Performed by: FAMILY MEDICINE

## 2019-03-25 PROCEDURE — 80053 COMPREHEN METABOLIC PANEL: CPT | Performed by: FAMILY MEDICINE

## 2019-03-25 PROCEDURE — 81003 URINALYSIS AUTO W/O SCOPE: CPT | Performed by: FAMILY MEDICINE

## 2019-03-25 PROCEDURE — 82043 UR ALBUMIN QUANTITATIVE: CPT | Performed by: FAMILY MEDICINE

## 2019-03-25 RX ORDER — SIMVASTATIN 20 MG
20 TABLET ORAL AT BEDTIME
Qty: 90 TABLET | Refills: 3 | Status: SHIPPED | OUTPATIENT
Start: 2019-03-25 | End: 2020-05-17

## 2019-03-25 ASSESSMENT — MIFFLIN-ST. JEOR: SCORE: 1887.67

## 2019-03-25 NOTE — PATIENT INSTRUCTIONS
Preventive Health Recommendations  Male Ages 50 - 64    Yearly exam:             See your health care provider every year in order to  o   Review health changes.   o   Discuss preventive care.    o   Review your medicines if your doctor has prescribed any.     Have a cholesterol test every 5 years, or more frequently if you are at risk for high cholesterol/heart disease.     Have a diabetes test (fasting glucose) every three years. If you are at risk for diabetes, you should have this test more often.     Have a colonoscopy at age 50, or have a yearly FIT test (stool test). These exams will check for colon cancer.      Talk with your health care provider about whether or not a prostate cancer screening test (PSA) is right for you.    You should be tested each year for STDs (sexually transmitted diseases), if you re at risk.     Shots: Get a flu shot each year. Get a tetanus shot every 10 years.     Nutrition:    Eat at least 5 servings of fruits and vegetables daily.     Eat whole-grain bread, whole-wheat pasta and brown rice instead of white grains and rice.     Get adequate Calcium and Vitamin D.     Lifestyle    Exercise for at least 150 minutes a week (30 minutes a day, 5 days a week). This will help you control your weight and prevent disease.     Limit alcohol to one drink per day.     No smoking.     Wear sunscreen to prevent skin cancer.     See your dentist every six months for an exam and cleaning.     See your eye doctor every 1 to 2 years.      [facility]                        To reach your care team during and after hours:   [phone]  To reach our pharmacy:        [phone]    Clinic Hours                        Our clinic hours are:    Monday   7:30 am to 7:00 pm                  Tuesday through Friday 7:30 am to 5:00 pm                             Saturday   8:00 am to 12:00 pm      Sunday   Closed      Pharmacy Hours                        Our pharmacy hours are:     Monday   8:30 am to 7:00 pm       Tuesday to Friday  8:30 am to 6:00 pm                       Saturday    9:00 am to 1:00 pm              Sunday    Closed              There is also information available at our web site:  [email or web address]    If your provider ordered any lab tests and you do not receive the results within 10 business days, please call the clinic.    If you need a medication refill please contact your pharmacy.  Please allow 2-3 business days for your refill to be completed.    Our clinic offers telephone visits and e visits.  Please ask one of your team members to explain more.      Use [employer] (secure email communication and access to your chart) to send your primary care provider a message or make an appointment. Ask someone on your Team how to sign up for [email or web address].  Immunizations                      Immunization History   Administered Date(s) Administered     Influenza (IIV3) PF 10/25/2013     Influenza Vaccine IM 3yrs+ 4 Valent IIV4 12/15/2017     MMR 08/15/1990     Poliovirus, inactivated (IPV) 01/21/2011     TD (ADULT, 7+) 04/22/1997, 07/18/2003     TDAP Vaccine (Boostrix) 01/21/2011     Twinrix A/B 01/21/2011, 02/26/2011, 08/05/2011     Typhoid Oral 01/21/2011        Health Maintenance                         Health Maintenance Due   Topic Date Due     HIV SCREEN (SYSTEM ASSIGNED)  09/08/1978     Zoster (Shingles) Vaccine (1 of 2) 09/08/2010     Colon Cancer Screening - FIT Test - yearly  10/25/2014     Preventive Care Visit  12/15/2018     Prostate Test (PSA) - yearly  12/15/2018

## 2019-03-25 NOTE — PROGRESS NOTES
SUBJECTIVE:   CC: Nam May is an 58 year old male who presents for preventative health visit.     Physical   Annual:     Getting at least 3 servings of Calcium per day:  Yes    Bi-annual eye exam:  Yes    Dental care twice a year:  Yes    Sleep apnea or symptoms of sleep apnea:  Excessive snoring    Diet:  Regular (no restrictions)    Frequency of exercise:  2-3 days/week    Duration of exercise:  15-30 minutes    Taking medications regularly:  Yes    Medication side effects:  None    Additional concerns today:  No    PHQ-2 Total Score: 0    GERD - no issues    Lipids    Recent Labs   Lab Test 12/15/17  0814 08/31/16  0821 06/05/15  0828 05/02/14  0843   CHOL 204* 200* 174 163   HDL 55 53 50 42   * 124* 101 96   TRIG 124 115 116 126   CHOLHDLRATIO  --   --  3.5 3.9     BRAXTON - using CPAP nightly    Environmental Allergies/PND/sneezing - year round, worse at times, uses flonase, stopped zyrtec, occas nose bleeds    Questions - L-Arginine - desires to try for energy - [facility] - no symptoms - FH CAD - consider stress echo - reviewed ASA 81 mg, blood type AB+    Today's PHQ-2 Score:   PHQ-2 ( 1999 Pfizer) 3/23/2019   Q1: Little interest or pleasure in doing things 0   Q2: Feeling down, depressed or hopeless 0   PHQ-2 Score 0   Q1: Little interest or pleasure in doing things Not at all   Q2: Feeling down, depressed or hopeless Not at all   PHQ-2 Score 0       Abuse: Current or Past(Physical, Sexual or Emotional)- No  Do you feel safe in your environment? No    Social History     Tobacco Use     Smoking status: Former Smoker     Types: Cigars     Smokeless tobacco: Never Used     Tobacco comment: rare cigar <10 per year   Substance Use Topics     Alcohol use: Yes     Alcohol/week: 2.4 - 4.8 oz     Types: 4 - 8 Standard drinks or equivalent per week     Comment: avg 4-8 beers per week     Alcohol Use 3/23/2019   If you drink alcohol do you typically have greater than 3 drinks per day OR greater than 7  drinks per week? No       Last PSA:   PSA   Date Value Ref Range Status   12/15/2017 1.24 0 - 4 ug/L Final     Comment:     Assay Method:  Chemiluminescence using Siemens Vista analyzer       Reviewed orders with patient. Reviewed health maintenance and updated orders accordingly - Yes    Reviewed and updated as needed this visit by clinical staff  Tobacco  Allergies  Meds  Med Hx  Surg Hx  Fam Hx  Soc Hx        Reviewed and updated as needed this visit by Provider  Allergies        Health Maintenance     Colonoscopy:  Due 8/2023   FIT:  given              PSA:  done   DEXA:  NA    Health Maintenance Due   Topic Date Due     HIV SCREEN (SYSTEM ASSIGNED)  09/08/1978     ZOSTER IMMUNIZATION (1 of 2) 09/08/2010     FIT Q1 YR  10/25/2014     PREVENTIVE CARE VISIT  12/15/2018     PSA Q1 YR  12/15/2018       Current Problem List    Patient Active Problem List   Diagnosis     History of colonic polyps     GERD (gastroesophageal reflux disease)     Hyperlipidemia LDL goal <130     Seasonal allergies     Sleep apnea     ED (erectile dysfunction)     Retinal tear of left eye     Right knee pain     Hip pain, right     Tubular adenoma of colon- repeat colonoscopy in 2023 ( 5 years)        Past Medical History    Past Medical History:   Diagnosis Date     Colon polyp 08/2018    6 mm polyp     ED (erectile dysfunction) 2013    mild - Dr Cotto     Family history of malignant neoplasm of gastrointestinal tract     PGM - colon ca     GERD (gastroesophageal reflux disease)      Hyperlipidemia LDL goal <130 2008     Personal history of colonic polyps 12/99, 12/02, 3/05, 12/08    adenoma, tubular adenoma      Retinal tear of left eye 2/15    VRS     Seasonal allergies summer/fall    Allergic rhinitis H/O AIT - ragweed     Sleep apnea 4/11    severe - CPAP       Past Surgical History    Past Surgical History:   Procedure Laterality Date     COLONOSCOPY  12/99, 12/02, 3/05, 12/08, 3/13, 8/28    tubular adenoma - 6 mm polyp, due  5 yrs     HC REMOVE TONSILS/ADENOIDS,<13 Y/O  1964    T & A <12y.o.     STRESS ECHO ([facility])  10/08    normal     SURGICAL HISTORY OF -  Right 2003    rt great toe fusion     SURGICAL HISTORY OF -  Right 2010    Rt Knee Arthroscopy - Dr Curtis     SURGICAL HISTORY OF -  Left 2015    VRS - left eye surgery       Current Medications    Current Outpatient Medications   Medication Sig Dispense Refill     simvastatin (ZOCOR) 20 MG tablet Take 1 tablet (20 mg) by mouth At Bedtime 90 tablet 3     ASPIRIN 81 MG OR TABS 1 TABLET DAILY       MULTIVITAMIN OR None Entered       Omega-3 Krill Oil 500 MG CAPS          Allergies    No Known Allergies    Immunizations    Immunization History   Administered Date(s) Administered     Influenza (IIV3) PF 10/25/2013     Influenza Vaccine IM 3yrs+ 4 Valent IIV4 12/15/2017     MMR 08/15/1990     Poliovirus, inactivated (IPV) 2011     TD (ADULT, 7+) 1997, 2003     TDAP Vaccine (Boostrix) 2011     Twinrix A/B 2011, 2011, 2011     Typhoid Oral 2011     Zoster vaccine recombinant adjuvanted (SHINGRIX) 2019       Family History    Family History   Problem Relation Age of Onset     Cancer Father         lymphoma - stage 1E - ? aggressive type -      Alzheimer Disease Father      Cerebrovascular Disease Father          2016     Obesity Mother      Hypertension Mother      C.A.D. Paternal Grandfather          at age 56     Cancer - colorectal Paternal Grandmother         40's     Cerebrovascular Disease Maternal Grandfather          at age 79     C.A.D. Maternal Grandfather        Social History    Social History     Socioeconomic History     Marital status:      Spouse name: Arabella     Number of children: 2     Years of education: 18     Highest education level: Not on file   Occupational History     Employer: [employer]   Social Needs     Financial resource strain: Not on file     Food insecurity:      Worry: Not on file     Inability: Not on file     Transportation needs:     Medical: Not on file     Non-medical: Not on file   Tobacco Use     Smoking status: Former Smoker     Types: Cigars     Smokeless tobacco: Never Used     Tobacco comment: rare cigar <10 per year   Substance and Sexual Activity     Alcohol use: Yes     Alcohol/week: 2.4 - 4.8 oz     Types: 4 - 8 Standard drinks or equivalent per week     Comment: avg 4-8 beers per week     Drug use: No     Sexual activity: Yes     Partners: Female   Lifestyle     Physical activity:     Days per week: Not on file     Minutes per session: Not on file     Stress: Not on file   Relationships     Social connections:     Talks on phone: Not on file     Gets together: Not on file     Attends Orthodoxy service: Not on file     Active member of club or organization: Not on file     Attends meetings of clubs or organizations: Not on file     Relationship status: Not on file     Intimate partner violence:     Fear of current or ex partner: Not on file     Emotionally abused: Not on file     Physically abused: Not on file     Forced sexual activity: Not on file   Other Topics Concern      Service Not Asked     Blood Transfusions Not Asked     Caffeine Concern Yes     Comment: 2 cups qd     Occupational Exposure Not Asked     Hobby Hazards Not Asked     Sleep Concern Not Asked     Stress Concern Not Asked     Weight Concern Not Asked     Special Diet Not Asked     Back Care Not Asked     Exercise Yes     Comment: 5-6 days per week     Bike Helmet Not Asked     Seat Belt Yes     Self-Exams Not Asked     Parent/sibling w/ CABG, MI or angioplasty before 65F 55M? No   Social History Narrative     Not on file              Review of Systems  CONSTITUTIONAL: NEGATIVE for fever, chills, change in weight  INTEGUMENTARY/SKIN: NEGATIVE for worrisome rashes, moles or lesions  EYES: NEGATIVE for vision changes or irritation  ENT: NEGATIVE for ear, mouth and throat  problems  RESP: NEGATIVE for significant cough or SOB  CV: NEGATIVE for chest pain, palpitations or peripheral edema  GI: NEGATIVE for nausea, abdominal pain, heartburn, or change in bowel habits   male: negative for dysuria, hematuria, decreased urinary stream, erectile dysfunction, urethral discharge  MUSCULOSKELETAL: NEGATIVE for significant arthralgias or myalgia  NEURO: NEGATIVE for weakness, dizziness or paresthesias  ENDOCRINE: NEGATIVE for temperature intolerance, skin/hair changes  HEME/ALLERGY/IMMUNE: NEGATIVE for bleeding problems  PSYCHIATRIC: NEGATIVE for changes in mood or affect    OBJECTIVE:   /82   Pulse 69   Temp 97.7  F (36.5  C) (Oral)   Ht 1.829 m (6')   Wt 103 kg (227 lb)   SpO2 97%   BMI 30.79 kg/m      Physical Exam  GENERAL: healthy, alert and no distress  EYES: Eyes grossly normal to inspection, PERRL and conjunctivae and sclerae normal  HENT: ear canals and TM's normal, nose and mouth without ulcers or lesions  NECK: no adenopathy, no asymmetry, masses, or scars and thyroid normal to palpation  RESP: lungs clear to auscultation - no rales, rhonchi or wheezes  CV: regular rate and rhythm, normal S1 S2, no S3 or S4, no murmur, click or rub, no peripheral edema and peripheral pulses strong  ABDOMEN: soft, nontender, no hepatosplenomegaly, no masses and bowel sounds normal   (male): normal male genitalia without lesions or urethral discharge, no hernia  RECTAL: normal sphincter tone, no rectal masses, prostate normal size, smooth, nontender without nodules or masses  MS: no gross musculoskeletal defects noted, no edema  SKIN: no suspicious lesions or rashes  NEURO: Normal strength and tone, mentation intact and speech normal  PSYCH: mentation appears normal, affect normal/bright  LYMPH: no cervical adenopathy    Diagnostic Test Results:    pending    ASSESSMENT/PLAN:       ICD-10-CM    1. Routine general medical examination at a health care facility Z00.00 HIV Screening      Comprehensive metabolic panel     Lipid panel reflex to direct LDL Fasting     CK total     CBC with platelets     TSH with free T4 reflex     UA reflex to Microscopic and Culture     Albumin Random Urine Quantitative with Creat Ratio     Prostate spec antigen screen     Fecal colorectal cancer screen FIT     simvastatin (ZOCOR) 20 MG tablet   2. Obstructive sleep apnea syndrome G47.33 TSH with free T4 reflex   3. Gastroesophageal reflux disease without esophagitis K21.9 CBC with platelets   4. Hyperlipidemia LDL goal <130 E78.5 Comprehensive metabolic panel     Lipid panel reflex to direct LDL Fasting     CK total     simvastatin (ZOCOR) 20 MG tablet   5. Environmental allergies Z91.09    6. Tubular adenoma of colon- repeat colonoscopy in 2023 ( 5 years)  D12.6 Fecal colorectal cancer screen FIT   7. Screen for colon cancer Z12.11 Fecal colorectal cancer screen FIT   8. Screening for HIV (human immunodeficiency virus) Z11.4 HIV Screening   9. Screening for prostate cancer Z12.5 Prostate spec antigen screen   10. Medication monitoring encounter Z51.81 HIV Screening     Comprehensive metabolic panel     Lipid panel reflex to direct LDL Fasting     CK total     CBC with platelets     TSH with free T4 reflex     UA reflex to Microscopic and Culture     Albumin Random Urine Quantitative with Creat Ratio     Discussed treatment/modality options, including risk and benefits, he desires:    advised alcohol consumption 1oz per day or less, advised aspirin 81 mg po daily, advised 1 multivitamin per day, advised calcium 6017-2602 mg/d and Vitamin D 800-1200 IU/d, advised dentist every 6 months, advised diet, exercise, and weight loss, advised opthalmologist every 1-2 years, advised self testicular exam q month, further health care maintenance, further lab(s), medication refill(s) and observation.     All diagnosis above reviewed and noted above, otherwise stable.      See [employer] orders for further details.      Return in  about 1 year (around 3/25/2020), or if symptoms worsen or fail to improve, for Complete Physical.    Health Maintenance Due   Topic Date Due     HIV SCREEN (SYSTEM ASSIGNED)  1978     ZOSTER IMMUNIZATION (1 of 2) 2010     FIT Q1 YR  10/25/2014     PREVENTIVE CARE VISIT  12/15/2018     PSA Q1 YR  12/15/2018       COUNSELING:   Reviewed preventive health counseling, as reflected in patient instructions       Regular exercise       Healthy diet/nutrition       Vision screening       Hearing screening       Aspirin Prophylaxsis       HIV screeninx in teen years, 1x in adult years, and at intervals if high risk       Colon cancer screening       Prostate cancer screening    BP Readings from Last 1 Encounters:   19 134/82     Estimated body mass index is 30.79 kg/m  as calculated from the following:    Height as of this encounter: 1.829 m (6').    Weight as of this encounter: 103 kg (227 lb).      Weight management plan diet and exercise     reports that he has quit smoking. His smoking use included cigars. he has never used smokeless tobacco.      Counseling Resources:  ATP IV Guidelines  Pooled Cohorts Equation Calculator  FRAX Risk Assessment  ICSI Preventive Guidelines  Dietary Guidelines for Americans,   USDA's MyPlate  ASA Prophylaxis  Lung CA Screening    Juan C Palacio MD  [facility]

## 2019-03-25 NOTE — LETTER
[address] [id number] [phone]   March 27, 2019    Nam May  [address] [facility] [id number]      Dear Nam,    Here is a summary of your recent test results:    Labs are overall quite good.     We advise:     Continue current cares.   Balanced low cholesterol diet.   Regular exercise.     Your test results are enclosed.      Please contact me if you have any questions.           Thank you very much for trusting [facility]..     Healthy regards,        Juan C Palacio M.D.        Results for orders placed or performed in visit on 03/25/19   HIV Screening   Result Value Ref Range    HIV Antigen Antibody Combo Nonreactive NR^Nonreactive       Comprehensive metabolic panel   Result Value Ref Range    Sodium 141 133 - 144 mmol/L    Potassium 4.2 3.4 - 5.3 mmol/L    Chloride 108 94 - 109 mmol/L    Carbon Dioxide 27 20 - 32 mmol/L    Anion Gap 6 3 - 14 mmol/L    Glucose 87 70 - 99 mg/dL    Urea Nitrogen 22 7 - 30 mg/dL    Creatinine 0.91 0.66 - 1.25 mg/dL    GFR Estimate >90 >60 mL/min/[1.73_m2]    GFR Estimate If Black >90 >60 mL/min/[1.73_m2]    Calcium 9.3 8.5 - 10.1 mg/dL    Bilirubin Total 1.2 0.2 - 1.3 mg/dL    Albumin 4.3 3.4 - 5.0 g/dL    Protein Total 7.5 6.8 - 8.8 g/dL    Alkaline Phosphatase 65 40 - 150 U/L    ALT 35 0 - 70 U/L    AST 24 0 - 45 U/L   Lipid panel reflex to direct LDL Fasting   Result Value Ref Range    Cholesterol 190 <200 mg/dL    Triglycerides 120 <150 mg/dL    HDL Cholesterol 47 >39 mg/dL    LDL Cholesterol Calculated 119 (H) <100 mg/dL    Non HDL Cholesterol 143 (H) <130 mg/dL   CK total   Result Value Ref Range    CK Total 152 30 - 300 U/L   CBC with platelets   Result Value Ref Range    WBC 8.9 4.0 - 11.0 10e9/L    RBC Count 5.11 4.4 - 5.9 10e12/L    Hemoglobin 15.8 13.3 - 17.7 g/dL    Hematocrit 45.5 40.0 - 53.0 %    MCV 89 78 - 100 fl    MCH 30.9 26.5 - 33.0 pg     MCHC 34.7 31.5 - 36.5 g/dL    RDW 12.9 10.0 - 15.0 %    Platelet Count 268 150 - 450 10e9/L   TSH with free T4 reflex   Result Value Ref Range    TSH 1.81 0.40 - 4.00 mU/L   UA reflex to Microscopic and Culture   Result Value Ref Range    Color Urine Yellow     Appearance Urine Clear     Glucose Urine Negative NEG^Negative mg/dL    Bilirubin Urine Negative NEG^Negative    Ketones Urine Negative NEG^Negative mg/dL    Specific Gravity Urine 1.020 1.003 - 1.035    Blood Urine Negative NEG^Negative    pH Urine 6.5 5.0 - 7.0 pH    Protein Albumin Urine Negative NEG^Negative mg/dL    Urobilinogen Urine 0.2 0.2 - 1.0 EU/dL    Nitrite Urine Negative NEG^Negative    Leukocyte Esterase Urine Negative NEG^Negative    Source Midstream Urine    Albumin Random Urine Quantitative with Creat Ratio   Result Value Ref Range    Creatinine Urine 162 mg/dL    Albumin Urine mg/L 11 mg/L    Albumin Urine mg/g Cr 6.54 0 - 17 mg/g Cr   Prostate spec antigen screen   Result Value Ref Range    PSA 1.17 0 - 4 ug/L

## 2019-03-26 LAB
CREAT UR-MCNC: 162 MG/DL
HIV 1+2 AB+HIV1 P24 AG SERPL QL IA: NONREACTIVE
MICROALBUMIN UR-MCNC: 11 MG/L
MICROALBUMIN/CREAT UR: 6.54 MG/G CR (ref 0–17)

## 2019-06-06 ENCOUNTER — OFFICE VISIT (OUTPATIENT)
Dept: FAMILY MEDICINE | Facility: CLINIC | Age: 59
End: 2019-06-06
Payer: COMMERCIAL

## 2019-06-06 VITALS
DIASTOLIC BLOOD PRESSURE: 82 MMHG | HEIGHT: 72 IN | SYSTOLIC BLOOD PRESSURE: 134 MMHG | TEMPERATURE: 97.6 F | OXYGEN SATURATION: 96 % | HEART RATE: 56 BPM | WEIGHT: 223.1 LBS | BODY MASS INDEX: 30.22 KG/M2

## 2019-06-06 DIAGNOSIS — H10.13 ALLERGIC CONJUNCTIVITIS, BILATERAL: Primary | ICD-10-CM

## 2019-06-06 PROCEDURE — 99213 OFFICE O/P EST LOW 20 MIN: CPT | Performed by: NURSE PRACTITIONER

## 2019-06-06 RX ORDER — OLOPATADINE HYDROCHLORIDE 2 MG/ML
1 SOLUTION/ DROPS OPHTHALMIC DAILY
Qty: 1 BOTTLE | Refills: 3 | Status: SHIPPED | OUTPATIENT
Start: 2019-06-06 | End: 2020-01-13

## 2019-06-06 ASSESSMENT — MIFFLIN-ST. JEOR: SCORE: 1869.97

## 2019-06-06 NOTE — PATIENT INSTRUCTIONS
Patient Education     Understanding Noninfectious Red Eye: Treating Inflammation     Wrap a cold pack in a thin towel before using as a cold compress.     Red eyes are sometimes caused by viral or bacterial infections. But inflammation in one or both eyes often happens because of allergies or environmental irritants. Here's a closer look at these two common causes of eye inflammation, and how to treat them.   Allergies  Do your eyes swell and itch when you pet a cat? Do they get red, watery, and itchy every spring or summer? If so, you may have an allergy to animals. Or you may have an allergy to a fine powder (pollen) made by certain plants. Along with dust and mold, animals and pollen are the most common causes of allergies. Often both eyes will get inflamed when you are around whatever causes your allergy.  Treating the symptoms:    The only cure for an allergy is to avoid the substance (allergen) that causes it.    Eye drops and cold compresses can help reduce swelling. They can ease redness and itching.    Symptoms often get better if you use allergy eye drops. Or if you limit your contact with the allergen.     If your allergy is severe, your healthcare provider may prescribe oral medicine. This may include antihistamines or steroids.    Your healthcare provider may refer you to a specialist.  Environmental irritants  Your eyes can also be irritated by things such as:    Air pollution    Smoke    Fumes    Some eye drops    Contact lenses, especially extended-wear lenses  These environmental irritants can make your eye s blood vessels swell. Your eyelids may get red and swollen. Your eyes may water. But they don t often itch as much as they do with allergies. Depending on the cause, one or both eyes may be inflamed.  Treating the symptoms:    Stay away from the irritant.    Try artificial tears to help flush out your eye. They lubricate your eye s surface.    Ask your healthcare provider about medicated  anti-inflammatory or anti-allergy eye drops. These can reduce swelling and ease redness.  Date Last Reviewed: 11/1/2017 2000-2018 [email or web address]. [address] [place] [address], [place] [address]. All rights reserved. This information is not intended as a substitute for professional medical care. Always follow your healthcare professional's instructions.

## 2019-06-06 NOTE — PROGRESS NOTES
Subjective     Dirk CLAUDIA May is a 58 year old male who presents to clinic today for the following health issues:    HPI   Eye(s) Problem      Duration: Mr May typically has red irritated eyes from spring through fall due to  Allergies and uses nasonex and zyrtec     This morning he was unable to insert a contact into the righteye and it is very red    No mucopurulent drainage    No vision loss    No eye pain    No sinus congestion , fever or chilling     Description:  Location: right  Pain: no   Redness: YES  Discharge: YES- tearing    Accompanying signs and symptoms: puffy    History (Trauma, foreign body exposure,): None    Precipitating or alleviating factors (contact use): yes- allergies    Therapies tried and outcome: None    Patient Active Problem List   Diagnosis     History of colonic polyps     GERD (gastroesophageal reflux disease)     Hyperlipidemia LDL goal <130     Seasonal allergies     Sleep apnea     ED (erectile dysfunction)     Retinal tear of left eye     Right knee pain     Hip pain, right     Tubular adenoma of colon- repeat colonoscopy in 2023 ( 5 years)      Past Surgical History:   Procedure Laterality Date     COLONOSCOPY  12/99, 12/02, 3/05, 12/08, 3/13, 8/28    tubular adenoma - 6 mm polyp, due 5 yrs     HC REMOVE TONSILS/ADENOIDS,<13 Y/O  1964    T & A <12y.o.     STRESS ECHO ([facility])  10/08    normal     SURGICAL HISTORY OF -  Right 01/2003    rt great toe fusion     SURGICAL HISTORY OF -  Right 09/2010    Rt Knee Arthroscopy - Dr Curtis     SURGICAL HISTORY OF -  Left 02/2015    VRS - left eye surgery       Social History     Tobacco Use     Smoking status: Former Smoker     Types: Cigars     Smokeless tobacco: Never Used     Tobacco comment: rare cigar <10 per year   Substance Use Topics     Alcohol use: Yes     Alcohol/week: 2.4 - 4.8 oz     Types: 4 - 8 Standard drinks or equivalent per week     Comment: avg 4-8 beers per week     Family History   Problem Relation Age of  Onset     Cancer Father         lymphoma - stage 1E - ? aggressive type -      Alzheimer Disease Father      Cerebrovascular Disease Father          2016     Obesity Mother      Hypertension Mother      C.A.D. Paternal Grandfather          at age 56     Cancer - colorectal Paternal Grandmother         40's     Cerebrovascular Disease Maternal Grandfather          at age 79     C.A.D. Maternal Grandfather          Current Outpatient Medications   Medication Sig Dispense Refill     ASPIRIN 81 MG OR TABS 1 TABLET DAILY       MULTIVITAMIN OR None Entered       olopatadine (PATADAY) 0.2 % ophthalmic solution Place 1 drop into both eyes daily 1 Bottle 3     Omega-3 Krill Oil 500 MG CAPS        simvastatin (ZOCOR) 20 MG tablet Take 1 tablet (20 mg) by mouth At Bedtime 90 tablet 3     No Known Allergies     Reviewed and updated as needed this visit by Provider         Review of Systems   ROS COMP: Constitutional, HEENT, cardiovascular, pulmonary, gi and gu systems are negative, except as otherwise noted.      Objective    /82 (BP Location: Right arm, Cuff Size: Adult Regular)   Pulse 56   Temp 97.6  F (36.4  C) (Tympanic)   Ht 1.829 m (6')   Wt 101.2 kg (223 lb 1.6 oz)   SpO2 96%   BMI 30.26 kg/m    Body mass index is 30.26 kg/m .  Physical Exam   GENERAL: healthy, alert and no distress  EYES: Eyes grossly normal to inspection, PERRL and bulbar conjunctiva injected and palpebral conjunctival swelling of right eye, no exudate noted, Left eye has minimal bulbar conjunctival injection and sclerae normal  HENT: ear canals and TM's normal, nose and mouth without ulcers or lesions  NECK: no adenopathy, no asymmetry, masses, or scars and thyroid normal to palpation      Diagnostic Test Results:  Labs reviewed in Epic        Assessment & Plan          ICD-10-CM    1. Allergic conjunctivitis, bilateral H10.13 olopatadine (PATADAY) 0.2 % ophthalmic solution          Patient Instructions       Patient  Education     Understanding Noninfectious Red Eye: Treating Inflammation     Wrap a cold pack in a thin towel before using as a cold compress.     Red eyes are sometimes caused by viral or bacterial infections. But inflammation in one or both eyes often happens because of allergies or environmental irritants. Here's a closer look at these two common causes of eye inflammation, and how to treat them.   Allergies  Do your eyes swell and itch when you pet a cat? Do they get red, watery, and itchy every spring or summer? If so, you may have an allergy to animals. Or you may have an allergy to a fine powder (pollen) made by certain plants. Along with dust and mold, animals and pollen are the most common causes of allergies. Often both eyes will get inflamed when you are around whatever causes your allergy.  Treating the symptoms:    The only cure for an allergy is to avoid the substance (allergen) that causes it.    Eye drops and cold compresses can help reduce swelling. They can ease redness and itching.    Symptoms often get better if you use allergy eye drops. Or if you limit your contact with the allergen.     If your allergy is severe, your healthcare provider may prescribe oral medicine. This may include antihistamines or steroids.    Your healthcare provider may refer you to a specialist.  Environmental irritants  Your eyes can also be irritated by things such as:    Air pollution    Smoke    Fumes    Some eye drops    Contact lenses, especially extended-wear lenses  These environmental irritants can make your eye s blood vessels swell. Your eyelids may get red and swollen. Your eyes may water. But they don t often itch as much as they do with allergies. Depending on the cause, one or both eyes may be inflamed.  Treating the symptoms:    Stay away from the irritant.    Try artificial tears to help flush out your eye. They lubricate your eye s surface.    Ask your healthcare provider about medicated  anti-inflammatory or anti-allergy eye drops. These can reduce swelling and ease redness.  Date Last Reviewed: 11/1/2017 2000-2018 [employer]. [address] [place] [address], [place] [address]. All rights reserved. This information is not intended as a substitute for professional medical care. Always follow your healthcare professional's instructions.               No follow-ups on file.    MAURICIO Horan [facility]

## 2019-11-03 ENCOUNTER — HEALTH MAINTENANCE LETTER (OUTPATIENT)
Age: 59
End: 2019-11-03

## 2020-01-13 ENCOUNTER — OFFICE VISIT (OUTPATIENT)
Dept: ALLERGY | Facility: CLINIC | Age: 60
End: 2020-01-13
Payer: COMMERCIAL

## 2020-01-13 VITALS
SYSTOLIC BLOOD PRESSURE: 132 MMHG | BODY MASS INDEX: 32.14 KG/M2 | HEART RATE: 68 BPM | OXYGEN SATURATION: 97 % | DIASTOLIC BLOOD PRESSURE: 85 MMHG | WEIGHT: 237 LBS

## 2020-01-13 DIAGNOSIS — J31.0 CHRONIC RHINITIS: Primary | ICD-10-CM

## 2020-01-13 PROCEDURE — 99204 OFFICE O/P NEW MOD 45 MIN: CPT | Performed by: ALLERGY & IMMUNOLOGY

## 2020-01-13 RX ORDER — TRIAMCINOLONE ACETONIDE 55 UG/1
2 SPRAY, METERED NASAL DAILY
Qty: 1 BOTTLE | Refills: 11 | Status: SHIPPED | OUTPATIENT
Start: 2020-01-13 | End: 2020-11-19

## 2020-01-13 RX ORDER — MONTELUKAST SODIUM 10 MG/1
10 TABLET ORAL AT BEDTIME
Qty: 30 TABLET | Refills: 1 | Status: SHIPPED | OUTPATIENT
Start: 2020-01-13 | End: 2020-03-18

## 2020-01-13 RX ORDER — DIPHENHYDRAMINE HCL 25 MG
25 TABLET ORAL EVERY 6 HOURS PRN
COMMUNITY
End: 2020-11-19

## 2020-01-13 NOTE — PROGRESS NOTES
"Nam May was seen in the Allergy Clinic at [facility]. The following are my recommendations regarding his Chronic Rhinitis    1. Begin triamcinolone nasal spray, 2 sprays in each nostril daily - appropriate nasal spray technique reviewed  2. Begin montelukast 10mg daily  3. Increase cetirizine to 10mg twice daily  4. Use sinus irrigation rinse daily as needed  5. Follow-up via MyChart in 1-2 weeks - consider antibiotics if symptoms have not improved  6. Follow-up in 4-6 weeks  7. Nam to follow up with Primary Care provider regarding elevated blood pressure.       Nam May is a 59 year old White male being seen today in consultation for allergies. He states that he has had allergies for his entire life. Typically his symptoms are seasonal in the spring and fall and will last a few days and resolve. Last spring he began having symptoms of sneezing, rhinorrhea, and nasal congestion and began taking cetirizine daily and did not stop the medication until approximately 1 week ago. He does not typically use nasal sprays until he feels something \"coming on.\" Last month Nam reports that his rhinoconjunctivitis symptoms began and they have continued to persist. This is unusual for him as winter is usually his best time of year. This morning he was feeling OK however he began to have rhinorrhea and congestion around 9AM and he took diphenhydramine. He has not had any new exposures and continues to live and work in the same environments. His daughter moved out recently and took her pet cat with her and he does not feel his home is particularly samaria. Nam reports having symptoms of sneezing, clear rhinorrhea, nasal congestion, loss of smell, and loss of taste. He denies symptoms of fever and headache.    Nam reports he was previously on allergen immunotherapy treatment for 12 years starting at age 25. He did not find immunotherapy to be particularly helpful in treating his symptoms.      Past " Medical History:   Diagnosis Date     Colon polyp 2018    6 mm polyp     ED (erectile dysfunction)     mild - Dr Cotto     Family history of malignant neoplasm of gastrointestinal tract     PGM - colon ca     GERD (gastroesophageal reflux disease)      Hyperlipidemia LDL goal <130      Personal history of colonic polyps , , 3/05,     adenoma, tubular adenoma      Retinal tear of left eye 2/15    VRS     Seasonal allergies summer/fall    Allergic rhinitis H/O AIT - ragweed     Sleep apnea     severe - CPAP     Family History   Problem Relation Age of Onset     Cancer Father         lymphoma - stage 1E - ? aggressive type -      Alzheimer Disease Father      Cerebrovascular Disease Father          2016     Obesity Mother      Hypertension Mother      C.A.D. Paternal Grandfather          at age 56     Cancer - colorectal Paternal Grandmother         40's     Cerebrovascular Disease Maternal Grandfather          at age 79     C.A.D. Maternal Grandfather      Past Surgical History:   Procedure Laterality Date     COLONOSCOPY  , , 3/05, , 3/13,     tubular adenoma - 6 mm polyp, due 5 yrs     HC REMOVE TONSILS/ADENOIDS,<13 Y/O  1964    T & A <12y.o.     STRESS ECHO ([facility])  10/08    normal     SURGICAL HISTORY OF -  Right 2003    rt great toe fusion     SURGICAL HISTORY OF -  Right 2010    Rt Knee Arthroscopy - Dr Curtis     SURGICAL HISTORY OF -  Left 2015    VRS - left eye surgery       ENVIRONMENTAL HISTORY: [place] The home is heated with a forced air and gas furnace. They do have central air conditioning. The patient's bedroom is furnished with carpeting in bedroom.  Pets inside the house include None. There is no history of cockroach or mice infestation. There is/are 0 smokers in the house.  The house does not have a damp basement.     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 He has missed 0 days of school/work. He lives with his spouse.  His spouse works as a/an paraprofessional at [employer].    REVIEW OF SYSTEMS:  General: negative for weight gain. negative for weight loss. negative for changes in sleep.   Eyes: negative for itching. negative for redness. positive  for tearing/watering. negative for vision changes  Ears: negative for fullness. negative for hearing loss. negative for dizziness.   Nose: negative for snoring.negative for changes in smell. positive  for drainage. Positive for congestion.  Throat: negative for hoarseness. negative for sore throat. negative for trouble swallowing.   Lungs: negative for cough. negative for shortness of breath.negative for wheezing. negative for sputum production.   Cardiovascular: negative for chest pain. negative for swelling of ankles. negative for fast or irregular heartbeat.   Gastrointestinal: negative for nausea. negative for heartburn. negative for acid reflux.   Musculoskeletal: negative for joint pain. negative for joint stiffness. negative for joint swelling.   Neurologic: negative for seizures. negative for fainting. negative for weakness.   Psychiatric: negative for changes in mood. negative for anxiety.   Endocrine: negative for cold intolerance. negative for heat intolerance. negative for tremors.   Hematologic: negative for easy bruising. negative for easy bleeding.  Integumentary: negative for rash. negative for scaling. negative for nail changes.       Current Outpatient Medications:      ASPIRIN 81 MG OR TABS, 1 TABLET DAILY, Disp: , Rfl:      diphenhydrAMINE (BENADRYL) 25 MG tablet, Take 25 mg by mouth every 6 hours as needed for itching or allergies, Disp: , Rfl:      MULTIVITAMIN OR, None Entered, Disp: , Rfl:      Omega-3 Krill Oil 500 MG CAPS, , Disp: , Rfl:      simvastatin (ZOCOR) 20 MG tablet, Take 1 tablet (20 mg) by mouth At Bedtime, Disp: 90 tablet, Rfl: 3     olopatadine (PATADAY) 0.2 %  ophthalmic solution, Place 1 drop into both eyes daily, Disp: 1 Bottle, Rfl: 3  Immunization History   Administered Date(s) Administered     Influenza (IIV3) PF 10/25/2013     Influenza Vaccine IM > 6 months Valent IIV4 12/15/2017     MMR 08/15/1990     Poliovirus, inactivated (IPV) 01/21/2011     TD (ADULT, 7+) 04/22/1997, 07/18/2003     TDAP Vaccine (Boostrix) 01/21/2011     Twinrix A/B 01/21/2011, 02/26/2011, 08/05/2011     Typhoid Oral 01/21/2011     Zoster vaccine recombinant adjuvanted (SHINGRIX) 03/25/2019, 06/01/2019     No Known Allergies      EXAM:   BP (!) 149/81 (BP Location: Left arm, Patient Position: Sitting, Cuff Size: Adult Large)   Pulse 68   Wt 107.5 kg (237 lb)   SpO2 97%   BMI 32.14 kg/m    GENERAL APPEARANCE: alert, cooperative and not in distress  SKIN: no rashes, no lesions  HEAD: atraumatic, normocephalic  EYES: lids and lashes normal, conjunctivae and sclerae clear, pupils equal, round, reactive to light, EOM full and intact  ENT: no scars or lesions, nasal exam showed clear rhinorrhea, otoscopy showed external auditory canals clear, tympanic membranes normal, tongue midline and normal, soft palate, uvula, and tonsils normal  NECK: no asymmetry, masses, or scars, supple without significant adenopathy  LUNGS: unlabored respirations, no intercostal retractions or accessory muscle use, clear to auscultation without rales or wheezes  HEART: regular rate and rhythm without murmurs and normal S1 and S2  MUSCULOSKELETAL: no musculoskeletal defects are noted  NEURO: no focal deficits noted  PSYCH: does not appear depressed or anxious    WORKUP: None    ASSESSMENT/PLAN:  Nam May is a 59 year old male here for evaluation of allergies. He reports a history of seasonal rhinoconjunctivitis symptoms however this winter has had persistent nasal symptoms. These symptoms have not been relieved with oral antihistamines. He does not have accompanying fever, purulent drainage, or headaches which may  be suggestive of an underlying infection. Skin testing was not performed today due to recent antihistamine use.    1. Begin triamcinolone nasal spray, 2 sprays in each nostril daily - appropriate nasal spray technique reviewed  2. Begin montelukast 10mg daily  3. Increase cetirizine to 10mg twice daily  4. Use sinus irrigation rinse daily as needed  5. Follow-up via MyChart in 1-2 weeks - consider antibiotics if symptoms have not improved  6. Follow-up in 4-6 weeks  7. Dirk to follow up with Primary Care provider regarding elevated blood pressure.       Thank you for allowing me to participate in the care of Nam May.      Kristi Sosa MD  Allergy/Immunology  [facility]      Chart documentation done in part with Dragon Voice Recognition Software. Although reviewed after completion, some word and grammatical errors may remain.

## 2020-01-13 NOTE — PATIENT INSTRUCTIONS
If you have any questions regarding your allergies, asthma, or what we discussed during your visit today please call the allergy clinic or contact us via [employer].    [facility] RN Line: [phone]  [facility] Scheduling Line: [phone]  [facility] Scheduling Line: [phone]      1. Increase the Zyrtec (cetirizine) dose - 1 tablet twice a day    2. Start the Nasacort (triamcinolone) nasal spray - 2 sprays in each nostril daily    3. Start the Singulair (montelukast) - 1 tablet every evening    4. Call/send a [employer] message with an updated in 1 to 2 weeks. If you haven't improved I will send a prescription for antibiotics.    5. Follow-up in 1-2 months for additional allergy testing and to discuss other treatment options - you will need to stop all antihistamines for at least 7 days prior to testing

## 2020-01-13 NOTE — LETTER
"    1/13/2020         RE: Nam May  [address] [facility] [address]        Dear Colleague,    Thank you for referring your patient, Nam May, to the [facility]. Please see a copy of my visit note below.    Nam May was seen in the Allergy Clinic at [facility]. The following are my recommendations regarding his Chronic Rhinitis    1. Begin triamcinolone nasal spray, 2 sprays in each nostril daily - appropriate nasal spray technique reviewed  2. Begin montelukast 10mg daily  3. Increase cetirizine to 10mg twice daily  4. Use sinus irrigation rinse daily as needed  5. Follow-up via MyChart in 1-2 weeks - consider antibiotics if symptoms have not improved  6. Follow-up in 4-6 weeks  7. Nam to follow up with Primary Care provider regarding elevated blood pressure.       Nam May is a 59 year old White male being seen today in consultation for allergies. He states that he has had allergies for his entire life. Typically his symptoms are seasonal in the spring and fall and will last a few days and resolve. Last spring he began having symptoms of sneezing, rhinorrhea, and nasal congestion and began taking cetirizine daily and did not stop the medication until approximately 1 week ago. He does not typically use nasal sprays until he feels something \"coming on.\" Last month Nam reports that his rhinoconjunctivitis symptoms began and they have continued to persist. This is unusual for him as winter is usually his best time of year. This morning he was feeling OK however he began to have rhinorrhea and congestion around 9AM and he took diphenhydramine. He has not had any new exposures and continues to live and work in the same environments. His daughter moved out recently and took her pet cat with her and he does not feel his home is particularly samaria. Nam reports having symptoms of sneezing, clear rhinorrhea, nasal congestion, loss of smell, and loss of " taste. He denies symptoms of fever and headache.    Nam reports he was previously on allergen immunotherapy treatment for 12 years starting at age 25. He did not find immunotherapy to be particularly helpful in treating his symptoms.      Past Medical History:   Diagnosis Date     Colon polyp 2018    6 mm polyp     ED (erectile dysfunction)     mild - Dr Cotto     Family history of malignant neoplasm of gastrointestinal tract     PGM - colon ca     GERD (gastroesophageal reflux disease)      Hyperlipidemia LDL goal <130      Personal history of colonic polyps , , 3/05,     adenoma, tubular adenoma      Retinal tear of left eye 2/15    VRS     Seasonal allergies summer/fall    Allergic rhinitis H/O AIT - ragweed     Sleep apnea     severe - CPAP     Family History   Problem Relation Age of Onset     Cancer Father         lymphoma - stage 1E - ? aggressive type -      Alzheimer Disease Father      Cerebrovascular Disease Father          2016     Obesity Mother      Hypertension Mother      C.A.D. Paternal Grandfather          at age 56     Cancer - colorectal Paternal Grandmother         40's     Cerebrovascular Disease Maternal Grandfather          at age 79     C.A.D. Maternal Grandfather      Past Surgical History:   Procedure Laterality Date     COLONOSCOPY  , , 3/05, , 3/13,     tubular adenoma - 6 mm polyp, due 5 yrs     HC REMOVE TONSILS/ADENOIDS,<11 Y/O  1964    T & A <12y.o.     STRESS ECHO (METRO)  10/08    normal     SURGICAL HISTORY OF -  Right 2003    rt great toe fusion     SURGICAL HISTORY OF -  Right 2010    Rt Knee Arthroscopy - Dr Curtis     SURGICAL HISTORY OF -  Left 2015    VRS - left eye surgery       ENVIRONMENTAL HISTORY: The [place] The home is heated with a forced air and gas furnace. They do have central air conditioning. The patient's bedroom is furnished with  carpeting in bedroom.  Pets inside the house include None. There is no history of cockroach or mice infestation. There is/are 0 smokers in the house.  The house does not have a damp basement.     SOCIAL HISTORY:   Nam is employed as an . He has missed 0 days of school/work. He lives with his spouse.  His spouse works as a/an paraprofessional at [employer].    REVIEW OF SYSTEMS:  General: negative for weight gain. negative for weight loss. negative for changes in sleep.   Eyes: negative for itching. negative for redness. positive  for tearing/watering. negative for vision changes  Ears: negative for fullness. negative for hearing loss. negative for dizziness.   Nose: negative for snoring.negative for changes in smell. positive  for drainage. Positive for congestion.  Throat: negative for hoarseness. negative for sore throat. negative for trouble swallowing.   Lungs: negative for cough. negative for shortness of breath.negative for wheezing. negative for sputum production.   Cardiovascular: negative for chest pain. negative for swelling of ankles. negative for fast or irregular heartbeat.   Gastrointestinal: negative for nausea. negative for heartburn. negative for acid reflux.   Musculoskeletal: negative for joint pain. negative for joint stiffness. negative for joint swelling.   Neurologic: negative for seizures. negative for fainting. negative for weakness.   Psychiatric: negative for changes in mood. negative for anxiety.   Endocrine: negative for cold intolerance. negative for heat intolerance. negative for tremors.   Hematologic: negative for easy bruising. negative for easy bleeding.  Integumentary: negative for rash. negative for scaling. negative for nail changes.       Current Outpatient Medications:      ASPIRIN 81 MG OR TABS, 1 TABLET DAILY, Disp: , Rfl:      diphenhydrAMINE (BENADRYL) 25 MG tablet, Take 25 mg by mouth every 6 hours as needed for itching or allergies, Disp: , Rfl:       MULTIVITAMIN OR, None Entered, Disp: , Rfl:      Omega-3 Krill Oil 500 MG CAPS, , Disp: , Rfl:      simvastatin (ZOCOR) 20 MG tablet, Take 1 tablet (20 mg) by mouth At Bedtime, Disp: 90 tablet, Rfl: 3     olopatadine (PATADAY) 0.2 % ophthalmic solution, Place 1 drop into both eyes daily, Disp: 1 Bottle, Rfl: 3  Immunization History   Administered Date(s) Administered     Influenza (IIV3) PF 10/25/2013     Influenza Vaccine IM > 6 months Valent IIV4 12/15/2017     MMR 08/15/1990     Poliovirus, inactivated (IPV) 01/21/2011     TD (ADULT, 7+) 04/22/1997, 07/18/2003     TDAP Vaccine (Boostrix) 01/21/2011     Twinrix A/B 01/21/2011, 02/26/2011, 08/05/2011     Typhoid Oral 01/21/2011     Zoster vaccine recombinant adjuvanted (SHINGRIX) 03/25/2019, 06/01/2019     No Known Allergies      EXAM:   BP (!) 149/81 (BP Location: Left arm, Patient Position: Sitting, Cuff Size: Adult Large)   Pulse 68   Wt 107.5 kg (237 lb)   SpO2 97%   BMI 32.14 kg/m     GENERAL APPEARANCE: alert, cooperative and not in distress  SKIN: no rashes, no lesions  HEAD: atraumatic, normocephalic  EYES: lids and lashes normal, conjunctivae and sclerae clear, pupils equal, round, reactive to light, EOM full and intact  ENT: no scars or lesions, nasal exam showed clear rhinorrhea, otoscopy showed external auditory canals clear, tympanic membranes normal, tongue midline and normal, soft palate, uvula, and tonsils normal  NECK: no asymmetry, masses, or scars, supple without significant adenopathy  LUNGS: unlabored respirations, no intercostal retractions or accessory muscle use, clear to auscultation without rales or wheezes  HEART: regular rate and rhythm without murmurs and normal S1 and S2  MUSCULOSKELETAL: no musculoskeletal defects are noted  NEURO: no focal deficits noted  PSYCH: does not appear depressed or anxious    WORKUP: None    ASSESSMENT/PLAN:  Nam Mya is a 59 year old male here for evaluation of allergies. He reports a history  of seasonal rhinoconjunctivitis symptoms however this winter has had persistent nasal symptoms. These symptoms have not been relieved with oral antihistamines. He does not have accompanying fever, purulent drainage, or headaches which may be suggestive of an underlying infection. Skin testing was not performed today due to recent antihistamine use.    1. Begin triamcinolone nasal spray, 2 sprays in each nostril daily - appropriate nasal spray technique reviewed  2. Begin montelukast 10mg daily  3. Increase cetirizine to 10mg twice daily  4. Use sinus irrigation rinse daily as needed  5. Follow-up via MyChart in 1-2 weeks - consider antibiotics if symptoms have not improved  6. Follow-up in 4-6 weeks  7. Dirk to follow up with Primary Care provider regarding elevated blood pressure.       Thank you for allowing me to participate in the care of Nam May.      Kristi Sosa MD  Allergy/Immunology  [facility]      Chart documentation done in part with Dragon Voice Recognition Software. Although reviewed after completion, some word and grammatical errors may remain.    Again, thank you for allowing me to participate in the care of your patient.        Sincerely,        Kristi Sosa MD

## 2020-03-12 DIAGNOSIS — J31.0 CHRONIC RHINITIS: ICD-10-CM

## 2020-03-12 NOTE — TELEPHONE ENCOUNTER
Disp  Refills  Start  End  JANES    montelukast (SINGULAIR) 10 MG tablet  30 tablet  1  1/13/2020   --    Sig - Route: Take 1 tablet (10 mg) by mouth At Bedtime - Oral    Sent to pharmacy as: montelukast (SINGULAIR) 10 MG tablet    Class: E-Prescribe    Order: [id number]    E-Prescribing Status: Receipt confirmed by pharmacy (1/13/2020  3:01 PM CST)      Celeste Diaz MA on 3/12/2020 at 4:50 PM

## 2020-03-18 RX ORDER — MONTELUKAST SODIUM 10 MG/1
10 TABLET ORAL AT BEDTIME
Qty: 30 TABLET | Refills: 1 | Status: SHIPPED | OUTPATIENT
Start: 2020-03-18 | End: 2020-11-19

## 2020-03-18 NOTE — TELEPHONE ENCOUNTER
Pending Prescriptions:                       Disp   Refills    montelukast (SINGULAIR) 10 MG tablet      30 tab*1            Sig: Take 1 tablet (10 mg) by mouth At Bedtime    RN refilled medication per [facility] Refill Protocol.     Rosa Mcdonald RN

## 2020-11-06 ENCOUNTER — VIRTUAL VISIT (OUTPATIENT)
Dept: FAMILY MEDICINE | Facility: OTHER | Age: 60
End: 2020-11-06
Payer: COMMERCIAL

## 2020-11-06 PROCEDURE — 99421 OL DIG E/M SVC 5-10 MIN: CPT | Performed by: INTERNAL MEDICINE

## 2020-11-06 NOTE — PROGRESS NOTES
"Date: 2020 10:50:03  Clinician: Padmini Bella  Clinician NPI: [id number]  Patient: Nam May  Patient : 1960  Patient Address: [address] [place] [address]  Patient Phone: [phone]  Visit Protocol: URI  Patient Summary:  Nam is a 60 year old ( : 1960 ) male who initiated a [facility] Visit for COVID-19 (Coronavirus) evaluation and screening. When asked the question \"Please sign me up to receive news, health information and promotions from [facility].\", Nam responded \"No\".    When asked when his symptoms started, Nam reported that he does not have any symptoms.   He denies taking antibiotic medication in the past month and having recent facial or sinus surgery in the past 60 days.    Pertinent COVID-19 (Coronavirus) information  Nam does not work or volunteer as healthcare worker or a . In the past 14 days, Nam has not worked or volunteered at a healthcare facility or group living setting.   In the past 14 days, he also has not lived in a congregate living setting.   Nam has not had a close contact with a laboratory-confirmed COVID-19 patient within the last 14 days.    Since 2019, Nam has not been tested for COVID-19 and has had upper respiratory infection (URI) or influenza-like illness.      Date(s) of previous URI or influenza-like illness (free-text):  through 3/30     Symptoms Nam experienced during previous URI or influenza-like illness as reported by the patient (free-text): 4 days of cough, soar throat, fever, weakness, etc.  Then about another 3-4 weeks of intermittent weakness, fuzzy eyesight, dizziness, exhaustion, etc.        Pertinent medical history  Nam does not need a return to work/school note.   Weight: 225 lbs   Nam does not smoke or use smokeless tobacco.   Weight: 225 lbs    MEDICATIONS: simvastatin oral, ALLERGIES: NKDA  Clinician Response:  Dear Nam,   Based on the information you have provided, you do not currently meet our " criteria to be tested for Covid-19 as you do not currently have symptoms of Covid-19 and have not personally been in direct&nbsp;close contact with someone who has tested positive for Covid-19.&nbsp;&nbsp;  In the future, if you develop symptoms of Covid-19 or you personally spend at least 15 minutes within 6 feet of someone who has a positive Covid-19 test, please contact your primary doctor or do another [facility] visit.      Also, please redo an [facility] visit or call your clinic if you think we missed needed information, your exposure information has changed, or you develop symptoms.        Where can I get more information?  &nbsp;&nbsp;&nbsp;&nbsp;&nbsp; * [facility]www.[facility] -- About COVID-19:&nbsp;[email or web address]  CDC -- What to Do If You're Sick:&nbsp;www.cdc.gov/coronavirus/2019-ncov/about/steps-when-sick.html  [facility] -- Ending Home Isolation:&nbsp;www.cdc.gov/coronavirus/2019-ncov/hcp/disposition-in-home-patients.html  [facility] -- Caring for Someone:&nbsp;www.cdc.gov/coronavirus/2019-ncov/if-you-are-sick/care-for-someone.html  [facility] -- Interim Guidance for Hospital Discharge to Home:&nbsp;www.[facility]./diseases/coronavirus/hcp/hospdischarge.pdf  [facility] clinical trials (COVID-19 research studies):&nbsp;clinicalaffairs.[facility]/umn-clinical-trials  Below are the COVID-19 hotlines at the [place] [facility] ([facility]). Interpreters are available.   For health questions: Call 726-280-1030 or 1-431.584.1875 (7 a.m. to 7 p.m.) For questions about schools and childcare: Call 379-057-6880 or 1-846.142.2392 (7 a.m. to 7 p.m.)          Diagnosis: Worries  Diagnosis ICD: R45.82

## 2020-11-13 DIAGNOSIS — Z00.00 ROUTINE GENERAL MEDICAL EXAMINATION AT A HEALTH CARE FACILITY: ICD-10-CM

## 2020-11-13 DIAGNOSIS — E78.5 HYPERLIPIDEMIA LDL GOAL <130: ICD-10-CM

## 2020-11-13 NOTE — TELEPHONE ENCOUNTER
Routing refill request to provider for review/approval because:  Labs not current:    LDL Cholesterol Calculated   Date Value Ref Range Status   03/25/2019 119 (H) <100 mg/dL Final     Comment:     Above desirable:  100-129 mg/dl  Borderline High:  130-159 mg/dL  High:             160-189 mg/dL  Very high:       >189 mg/dl       Tameka Zhu RN

## 2020-11-15 RX ORDER — SIMVASTATIN 20 MG
TABLET ORAL
Qty: 90 TABLET | Refills: 0 | Status: SHIPPED | OUTPATIENT
Start: 2020-11-15 | End: 2020-11-17

## 2020-11-16 ENCOUNTER — HEALTH MAINTENANCE LETTER (OUTPATIENT)
Age: 60
End: 2020-11-16

## 2020-11-16 NOTE — TELEPHONE ENCOUNTER
RX done    Advise phone/video visit follow up soon    Fasting labs soon    CPX when able      Next 5 appointments (look out 90 days)    Nov 19, 2020  7:00 AM  PHYSICAL with Reza Hollis MD  [facility] ([facility]) [address] [place] [address] [phone] [phone]

## 2020-11-16 NOTE — PROGRESS NOTES
SUBJECTIVE:   CC: Nam May is an 60 year old male who presents for preventive health visit.     Healthy Habits:     Getting at least 3 servings of Calcium per day:  Yes    Bi-annual eye exam:  Yes    Dental care twice a year:  Yes    Sleep apnea or symptoms of sleep apnea:  Excessive snoring    Diet:  Regular (no restrictions)    Frequency of exercise:  4-5 days/week    Duration of exercise:  30-45 minutes    Taking medications regularly:  Yes    Medication side effects:  None    PHQ-2 Total Score: 0    Additional concerns today:  No    Hyperlipidemia Follow-Up      Are you regularly taking any medication or supplement to lower your cholesterol?   Yes- simvastatin    Are you having muscle aches or other side effects that you think could be caused by your cholesterol lowering medication?  No    Today's PHQ-2 Score:     Abuse: Current or Past(Physical, Sexual or Emotional)- No  Do you feel safe in your environment? Yes    Right toe pain lately with certain shoes - had a prior fusion and the toe is starting to bend upward at the IP joint.     Allergies are better     Social History     Tobacco Use     Smoking status: Former Smoker     Packs/day: 0.00     Years: 0.00     Pack years: 0.00     Types: Cigars     Smokeless tobacco: Never Used     Tobacco comment: Occasional cigar   Substance Use Topics     Alcohol use: Yes     Alcohol/week: 4.0 - 8.0 standard drinks     Comment: avg 0-12 beers per week     If you drink alcohol do you typically have >3 drinks per day or >7 drinks per week? No                      Last PSA:   PSA   Date Value Ref Range Status   03/25/2019 1.17 0 - 4 ug/L Final     Comment:     Assay Method:  Chemiluminescence using Siemens Vista analyzer       Reviewed orders with patient. Reviewed health maintenance and updated orders accordingly - Yes  Reviewed and updated as needed this visit by clinical staff  Tobacco  Allergies  Meds  Problems  Med Hx  Surg Hx  Fam Hx  Soc Hx           Reviewed and updated as needed this visit by Provider  Tobacco  Allergies  Meds  Problems  Med Hx  Surg Hx  Fam Hx           ROS:  Constitutional, HEENT, cardiovascular, pulmonary, GI, , musculoskeletal, neuro, skin, endocrine and psych systems are negative, except as otherwise noted.  OBJECTIVE:   /80   Pulse 65   Temp 94.9  F (34.9  C) (Tympanic)   Ht 1.829 m (6')   Wt 104.3 kg (230 lb)   SpO2 98%   BMI 31.19 kg/m    EXAM:  GENERAL: healthy, alert and no distress  EYES: Eyes grossly normal to inspection, PERRL and conjunctivae and sclerae normal  HENT: ear canals and TM's normal, nose and mouth without ulcers or lesions  NECK: no adenopathy, no asymmetry, masses, or scars and thyroid normal to palpation  RESP: lungs clear to auscultation - no rales, rhonchi or wheezes  BREAST: normal without masses, tenderness or nipple discharge and no palpable axillary masses or adenopathy  CV: regular rate and rhythm, normal S1 S2, no S3 or S4, no murmur, click or rub, no peripheral edema and peripheral pulses strong  ABDOMEN: reducible small umbilical hernia otherwise soft, nontender, no hepatosplenomegaly, no masses and bowel sounds normal   (male): normal male genitalia without lesions or urethral discharge, no hernia  MS: no gross musculoskeletal defects noted, no edema  SKIN: no suspicious lesions or rashes  NEURO: Normal strength and tone, mentation intact and speech normal  PSYCH: mentation appears normal, affect normal/bright  LYMPH: no cervical, supraclavicular, axillary, or inguinal adenopathy    ASSESSMENT/PLAN:   Routine general medical examination at a health care facility      Hyperlipidemia LDL goal <130  Controlled - continue medication.  - simvastatin (ZOCOR) 20 MG tablet  Dispense: 90 tablet; Refill: 3  - Lipid panel reflex to direct LDL Fasting  - Comprehensive metabolic panel    Chronic rhinitis  Stable - continue medication as needed  - triamcinolone (NASACORT) 55 MCG/ACT nasal  aerosol  Dispense: 1 Bottle; Refill: 11    Screening, deficiency anemia, iron  - CBC with platelets    Screening for malignant neoplasm of prostate  - Prostate spec antigen screen    Febrile illness history ~ 9 months ago and he would like to know if he had covid  Distant history of GERD like to help as he plans to visit elderly parents possibly.  Encourage social distancing  - COVID-19 Virus (Coronavirus) Antibody & Titer Reflex    Pain of toe of right foot  Status post fusion and seems like the toe is more elevated angle than it should be causing some callus formation.  Proper shoes may help and can talk with podiatry:  - Orthopedic & Spine  Referral    Umbilical hernia without obstruction and without gangrene  Minimally symptomatic declined referral for surgery consult      COUNSELING:  Reviewed preventive health counseling, as reflected in patient instructions    Estimated body mass index is 31.19 kg/m  as calculated from the following:    Height as of this encounter: 1.829 m (6').    Weight as of this encounter: 104.3 kg (230 lb).  Weight management plan: Discussed healthy diet and exercise guidelines     reports that he has quit smoking. His smoking use included cigars. He smoked 0.00 packs per day for 0.00 years. He has never used smokeless tobacco.      Return in about 1 year (around 11/19/2021) for wellness exam with fasting labs, in person, with Dr Ean Hollis.           Ean Hollis MD     [address] [address]  [email or web address]   Office: [phone]  Pager: [phone]

## 2020-11-19 ENCOUNTER — OFFICE VISIT (OUTPATIENT)
Dept: FAMILY MEDICINE | Facility: CLINIC | Age: 60
End: 2020-11-19
Payer: COMMERCIAL

## 2020-11-19 VITALS
HEART RATE: 65 BPM | WEIGHT: 230 LBS | TEMPERATURE: 94.9 F | SYSTOLIC BLOOD PRESSURE: 118 MMHG | DIASTOLIC BLOOD PRESSURE: 80 MMHG | HEIGHT: 72 IN | OXYGEN SATURATION: 98 % | BODY MASS INDEX: 31.15 KG/M2

## 2020-11-19 DIAGNOSIS — Z00.00 ROUTINE GENERAL MEDICAL EXAMINATION AT A HEALTH CARE FACILITY: Primary | ICD-10-CM

## 2020-11-19 DIAGNOSIS — Z13.0 SCREENING, DEFICIENCY ANEMIA, IRON: ICD-10-CM

## 2020-11-19 DIAGNOSIS — M79.674 PAIN OF TOE OF RIGHT FOOT: ICD-10-CM

## 2020-11-19 DIAGNOSIS — J31.0 CHRONIC RHINITIS: ICD-10-CM

## 2020-11-19 DIAGNOSIS — R50.9 FEBRILE ILLNESS: ICD-10-CM

## 2020-11-19 DIAGNOSIS — K42.9 UMBILICAL HERNIA WITHOUT OBSTRUCTION AND WITHOUT GANGRENE: ICD-10-CM

## 2020-11-19 DIAGNOSIS — E78.5 HYPERLIPIDEMIA LDL GOAL <130: ICD-10-CM

## 2020-11-19 DIAGNOSIS — Z12.5 SCREENING FOR MALIGNANT NEOPLASM OF PROSTATE: ICD-10-CM

## 2020-11-19 LAB
ALBUMIN SERPL-MCNC: 4.1 G/DL (ref 3.4–5)
ALP SERPL-CCNC: 70 U/L (ref 40–150)
ALT SERPL W P-5'-P-CCNC: 47 U/L (ref 0–70)
ANION GAP SERPL CALCULATED.3IONS-SCNC: 4 MMOL/L (ref 3–14)
AST SERPL W P-5'-P-CCNC: 27 U/L (ref 0–45)
BILIRUB SERPL-MCNC: 1 MG/DL (ref 0.2–1.3)
BUN SERPL-MCNC: 22 MG/DL (ref 7–30)
CALCIUM SERPL-MCNC: 9.4 MG/DL (ref 8.5–10.1)
CHLORIDE SERPL-SCNC: 108 MMOL/L (ref 94–109)
CHOLEST SERPL-MCNC: 205 MG/DL
CO2 SERPL-SCNC: 27 MMOL/L (ref 20–32)
CREAT SERPL-MCNC: 1.03 MG/DL (ref 0.66–1.25)
ERYTHROCYTE [DISTWIDTH] IN BLOOD BY AUTOMATED COUNT: 12.9 % (ref 10–15)
GFR SERPL CREATININE-BSD FRML MDRD: 78 ML/MIN/{1.73_M2}
GLUCOSE SERPL-MCNC: 107 MG/DL (ref 70–99)
HCT VFR BLD AUTO: 47.6 % (ref 40–53)
HDLC SERPL-MCNC: 51 MG/DL
HGB BLD-MCNC: 15.9 G/DL (ref 13.3–17.7)
LDLC SERPL CALC-MCNC: 130 MG/DL
MCH RBC QN AUTO: 30.4 PG (ref 26.5–33)
MCHC RBC AUTO-ENTMCNC: 33.4 G/DL (ref 31.5–36.5)
MCV RBC AUTO: 91 FL (ref 78–100)
NONHDLC SERPL-MCNC: 154 MG/DL
PLATELET # BLD AUTO: 281 10E9/L (ref 150–450)
POTASSIUM SERPL-SCNC: 4.4 MMOL/L (ref 3.4–5.3)
PROT SERPL-MCNC: 7.6 G/DL (ref 6.8–8.8)
PSA SERPL-ACNC: 1.43 UG/L (ref 0–4)
RBC # BLD AUTO: 5.23 10E12/L (ref 4.4–5.9)
SODIUM SERPL-SCNC: 139 MMOL/L (ref 133–144)
TRIGL SERPL-MCNC: 119 MG/DL
WBC # BLD AUTO: 6.9 10E9/L (ref 4–11)

## 2020-11-19 PROCEDURE — 80061 LIPID PANEL: CPT | Performed by: FAMILY MEDICINE

## 2020-11-19 PROCEDURE — G0103 PSA SCREENING: HCPCS | Performed by: FAMILY MEDICINE

## 2020-11-19 PROCEDURE — 86769 SARS-COV-2 COVID-19 ANTIBODY: CPT | Performed by: FAMILY MEDICINE

## 2020-11-19 PROCEDURE — 80053 COMPREHEN METABOLIC PANEL: CPT | Performed by: FAMILY MEDICINE

## 2020-11-19 PROCEDURE — 85027 COMPLETE CBC AUTOMATED: CPT | Performed by: FAMILY MEDICINE

## 2020-11-19 PROCEDURE — 99396 PREV VISIT EST AGE 40-64: CPT | Performed by: FAMILY MEDICINE

## 2020-11-19 PROCEDURE — 36415 COLL VENOUS BLD VENIPUNCTURE: CPT | Performed by: FAMILY MEDICINE

## 2020-11-19 RX ORDER — SIMVASTATIN 20 MG
20 TABLET ORAL AT BEDTIME
Qty: 90 TABLET | Refills: 3 | Status: SHIPPED | OUTPATIENT
Start: 2020-11-19 | End: 2021-11-08

## 2020-11-19 RX ORDER — TRIAMCINOLONE ACETONIDE 55 UG/1
2 SPRAY, METERED NASAL DAILY
Qty: 1 BOTTLE | Refills: 11 | Status: SHIPPED | OUTPATIENT
Start: 2020-11-19 | End: 2022-03-03

## 2020-11-19 RX ORDER — MONTELUKAST SODIUM 10 MG/1
10 TABLET ORAL AT BEDTIME
Qty: 30 TABLET | Refills: 1 | Status: CANCELLED | OUTPATIENT
Start: 2020-11-19

## 2020-11-19 ASSESSMENT — MIFFLIN-ST. JEOR: SCORE: 1891.27

## 2020-11-20 LAB
COVID-19 SPIKE RBD ABY TITER: NORMAL
COVID-19 SPIKE RBD ABY: NEGATIVE

## 2020-11-20 NOTE — RESULT ENCOUNTER NOTE
Dear Nam,    Here is a summary of your recent test results:  -Normal red blood cell (hgb) levels, normal white blood cell count and normal platelet levels.  -PSA (prostate specific antigen) test is normal.  This indicates a low likelihood of prostate cancer.  ADVISE: rechecking this in 1 year.  -Cholesterol levels (LDL,HDL, Triglycerides) are okay.  ADVISE: rechecking  in 1 year.  -7.6% of patients that have a similar profile to you will have a stroke, heart attack or death (related to heart disease) within the next 10 years and that is considered a low risk and cholesterol lowering medications are not recommended at this time (high risk is >10%, or >7.5% if other risk factors such has high blood pressure or other heart disease risk factors).    The ASCVD risk score (Hiwot MELARA Jr et al., 2013) returns the percentage likelihood of a first time Atherosclerotic Cardiovascular Disease (ASCVD) event.    The 10-year ASCVD risk score (Hiwot MELARA Jr., et al., 2013) is: 7.6%    Values used to calculate the score:      Age: 60 years      Sex: Male      Is Non- : No      Diabetic: No      Tobacco smoker: No      Systolic Blood Pressure: 118 mmHg      Is BP treated: No      HDL Cholesterol: 51 mg/dL      Total Cholesterol: 205 mg/dL      -Liver and gallbladder tests (ALT,AST, Alk phos,bilirubin) are normal.  -Kidney function (GFR) is normal.  -Sodium is normal.  -Potassium is normal.  -Calcium is normal.  -Glucose is slight elevated and may be a sign of early diabetes (prediabetes). ADVISE:: eating a low carbohydrate diet, exercising, trying to lose weight (if necessary) and rechecking your glucose level in 12 months.    For additional lab test information, labtestsonline.org is an excellent reference.           Thank you very much for trusting me and [facility].     Have a peaceful day.    Healthy regards,  Ean Hollis MD

## 2020-11-21 NOTE — RESULT ENCOUNTER NOTE
Dear Nam,    Here is a summary of your recent test results:  -COVID-19 Virus PCR Result =  Not Detected      For additional lab test information, labtestsonline.org is an excellent reference.    Thank you very much for trusting me and [facility].     Have a peaceful day.    Healthy regards,  Ean Hollis MD

## 2020-12-02 ENCOUNTER — ANCILLARY PROCEDURE (OUTPATIENT)
Dept: GENERAL RADIOLOGY | Facility: CLINIC | Age: 60
End: 2020-12-02
Attending: PODIATRIST
Payer: COMMERCIAL

## 2020-12-02 ENCOUNTER — OFFICE VISIT (OUTPATIENT)
Dept: PODIATRY | Facility: CLINIC | Age: 60
End: 2020-12-02
Payer: COMMERCIAL

## 2020-12-02 VITALS
BODY MASS INDEX: 30.48 KG/M2 | DIASTOLIC BLOOD PRESSURE: 88 MMHG | WEIGHT: 225 LBS | HEIGHT: 72 IN | SYSTOLIC BLOOD PRESSURE: 134 MMHG

## 2020-12-02 DIAGNOSIS — M20.31 HALLUX MALLEUS OF RIGHT FOOT: ICD-10-CM

## 2020-12-02 DIAGNOSIS — M19.079 ARTHRITIS OF BIG TOE: ICD-10-CM

## 2020-12-02 DIAGNOSIS — M79.674 TOE PAIN, RIGHT: ICD-10-CM

## 2020-12-02 DIAGNOSIS — M79.674 TOE PAIN, RIGHT: Primary | ICD-10-CM

## 2020-12-02 PROCEDURE — 73660 X-RAY EXAM OF TOE(S): CPT | Mod: RT | Performed by: RADIOLOGY

## 2020-12-02 PROCEDURE — 99213 OFFICE O/P EST LOW 20 MIN: CPT | Performed by: PODIATRIST

## 2020-12-02 ASSESSMENT — MIFFLIN-ST. JEOR: SCORE: 1868.59

## 2020-12-02 NOTE — PROGRESS NOTES
ASSESSMENT/PLAN:    Encounter Diagnoses   Name Primary?     Toe pain, right Yes     Hallux malleus of right foot      Arthritis of big toe      I explained that the interphalangeal joint has likely taken more load since fusion of the 1st metatarsophalangeal joint.  This has lead to some arthritis and joint contracture. He denies pain deeper in the joint.      I reviewed the XR images with the patient.  The relevant anatomy and function was discussed, in relation to the problem.      I first advise that he do his best to accommodate the bump. He can have downhill ski boots heat molded.      Exostectomy is a surgical option.  I think he is too active to perform an arthroplasty at this time. He would lose function of the toe. If the joint continues to become more rigid, then exostectomy with joint fusion is an option.      Jayden Lind DPM, FACFAS, MS    [place] [facility] & Ankle Surgery      ____________________________________________________________________    HPI:         Chief Complaint: consult on toe fusion status - January 2013  He had the first metatarsophalangeal joint fused.   Onset of problem: years  He has intermittent pain related to a prominent bump on the right great toe. This has slowly developed since surgery.  Prior XR and CT showing solid fusion of the fused joint and interphalangeal joint arthrosis.  *  Patient Active Problem List   Diagnosis     History of colonic polyps     GERD (gastroesophageal reflux disease)     Hyperlipidemia LDL goal <130     Seasonal allergies     Sleep apnea     ED (erectile dysfunction)     Retinal tear of left eye     Right knee pain     Hip pain, right     Tubular adenoma of colon- repeat colonoscopy in 2023 ( 5 years)    *  *  Past Surgical History:   Procedure Laterality Date     ARTHROSCOPY KNEE WITH MENISCECTOMY Right 07/2010    Meniscus tear surgery     COLONOSCOPY  12/99, 12/02, 3/05, 12/08, 3/13, 8/28    tubular adenoma - 6 mm polyp, due 5  yrs     EYE SURGERY Left 02/2015    VRS - left eye surgery     EYE SURGERY  02/2014    Retinal tear     HC REMOVE TONSILS/ADENOIDS,<13 Y/O  1964    T & A <12y.o.     ORTHOPEDIC SURGERY Right 01/2003    Right toe fusion, miniscus artho - right knee     STRESS ECHO ([facility])  10/2008    normal     SURGICAL HISTORY OF -  Right 09/2010    Rt Knee Arthroscopy - Dr Curtis     TOE SURGERY Right 01/2003    rt great toe fusion   *  *  Current Outpatient Medications   Medication Sig Dispense Refill     ASPIRIN 81 MG OR TABS 1 TABLET DAILY       MULTIVITAMIN OR None Entered       Omega-3 Krill Oil 500 MG CAPS        simvastatin (ZOCOR) 20 MG tablet Take 1 tablet (20 mg) by mouth At Bedtime 90 tablet 3     triamcinolone (NASACORT) 55 MCG/ACT nasal aerosol Spray 2 sprays into both nostrils daily 1 Bottle 11       ROS:     A 10-point review of systems was performed and is positive for that noted above in the HPI and as seen below.  All other areas are negative.     Numbness in feet?  Slightly after surgery   Calf pain with walking? no  Recent foot/ankle injury? no  Weight change over past 12 months? 5# gain  Self perception as overweight? yes  Recent flu-like symptoms? no  Joint pain other than feet ? 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;  Tobacco use:  no  Social History     Socioeconomic History     Marital status:      Spouse name: Arabella     Number of children: 2     Years of education: 18     Highest education level: Not on file   Occupational History     Employer: [employer]   Social Needs     Financial resource strain: Not on file     Food insecurity     Worry: Not on file     Inability: Not on file     Transportation needs     Medical: Not on file     Non-medical: Not on file   Tobacco Use     Smoking status: Former Smoker     Packs/day: 0.00     Years: 0.00     Pack years: 0.00     Types: Cigars     Smokeless tobacco: Never Used     Tobacco comment:  Occasional cigar   Substance and Sexual Activity     Alcohol use: Yes     Alcohol/week: 4.0 - 8.0 standard drinks     Comment: avg 0-12 beers per week     Drug use: No     Sexual activity: Yes     Partners: Female   Lifestyle     Physical activity     Days per week: Not on file     Minutes per session: Not on file     Stress: Not on file   Relationships     Social connections     Talks on phone: Not on file     Gets together: Not on file     Attends Nondenominational service: Not on file     Active member of club or organization: Not on file     Attends meetings of clubs or organizations: Not on file     Relationship status: Not on file     Intimate partner violence     Fear of current or ex partner: Not on file     Emotionally abused: Not on file     Physically abused: Not on file     Forced sexual activity: Not on file   Other Topics Concern      Service Not Asked     Blood Transfusions Not Asked     Caffeine Concern Yes     Comment: 2 cups qd     Occupational Exposure Not Asked     Hobby Hazards Not Asked     Sleep Concern Not Asked     Stress Concern Not Asked     Weight Concern Not Asked     Special Diet Not Asked     Back Care Not Asked     Exercise Yes     Comment: 5-6 days per week     Bike Helmet Not Asked     Seat Belt Yes     Self-Exams Not Asked     Parent/sibling w/ CABG, MI or angioplasty before 65F 55M? No   Social History Narrative     Not on file       Family history:  Family History   Problem Relation Age of Onset     Cancer Father         lymphoma - stage 1E - ? aggressive type -      Alzheimer Disease Father      Cerebrovascular Disease Father          2016     Obesity Mother      Hypertension Mother      C.A.D. Paternal Grandfather          at age 56     Cancer - colorectal Paternal Grandmother         40's     Colon Cancer Paternal Grandmother         Colon surgery after WWII     Cerebrovascular Disease Maternal Grandfather          at age 79     C.A.D. Maternal Grandfather   "      Rheumatoid arthritis:  no  Foot Problems: no  Diabetes: no      EXAM:    Vitals: /88   Ht 1.829 m (6')   Wt 102.1 kg (225 lb)   BMI 30.52 kg/m    BMI: Body mass index is 30.52 kg/m .  Height: 6' 0\"    Constitutional/ general:  Pt is in no apparent distress, appears well-nourished.  Cooperative with history and physical exam.     Vascular:  Pedal pulses are palpable bilaterally for both the DP and PT arteries.  CFT < 3 sec.  No edema.  Pedal hair growth noted.     Neuro:  Alert and oriented x 3. Coordinated gait.  Light touch sensation is intact to the L4, L5, S1 distributions. No obvious deficits.  No evidence of neurological-based weakness, spasticity, or contracture in the lower extremities.     Derm: Normal texture and turgor.  No erythema, ecchymosis, or cyanosis.  No open lesions.     Musculoskeletal:    Lower extremity muscle strength is normal.  Patient is ambulatory without an assistive device or brace . The right 1st metatarsophalangeal joint fusion is solid. He has a semi-rigid flexion deformity of the hallux.  This joint has limited range of motion.    Radiographic Exam:  X-Ray Findings: I personally reviewed the right great toe films.    Hallux flexion. Hallux interphalangeal joint arthritis. The bone is prominent, dorsal medially near the joint.          "

## 2020-12-02 NOTE — LETTER
12/2/2020         RE: Nam May  [address] [id number]        Dear Colleague,    Thank you for referring your patient, Nam May, to the [facility]. Please see a copy of my visit note below.      ASSESSMENT/PLAN:    Encounter Diagnoses   Name Primary?     Toe pain, right Yes     Hallux malleus of right foot      Arthritis of big toe      I explained that the interphalangeal joint has likely taken more load since fusion of the 1st metatarsophalangeal joint.  This has lead to some arthritis and joint contracture. He denies pain deeper in the joint.      I reviewed the XR images with the patient.  The relevant anatomy and function was discussed, in relation to the problem.      I first advise that he do his best to accommodate the bump. He can have downhill ski boots heat molded.      Exostectomy is a surgical option.  I think he is too active to perform an arthroplasty at this time. He would lose function of the toe. If the joint continues to become more rigid, then exostectomy with joint fusion is an option.      Jayden Lind, NATALIE, FACFAS, MS    [place] [facility] of Podiatry/Foot & Ankle Surgery      ____________________________________________________________________    HPI:         Chief Complaint: consult on toe fusion status - January 2013  He had the first metatarsophalangeal joint fused.   Onset of problem: years  He has intermittent pain related to a prominent bump on the right great toe. This has slowly developed since surgery.  Prior XR and CT showing solid fusion of the fused joint and interphalangeal joint arthrosis.  *  Patient Active Problem List   Diagnosis     History of colonic polyps     GERD (gastroesophageal reflux disease)     Hyperlipidemia LDL goal <130     Seasonal allergies     Sleep apnea     ED (erectile dysfunction)     Retinal tear of left eye     Right knee pain     Hip pain, right     Tubular adenoma of colon- repeat  colonoscopy in 2023 ( 5 years)    *  *  Past Surgical History:   Procedure Laterality Date     ARTHROSCOPY KNEE WITH MENISCECTOMY Right 07/2010    Meniscus tear surgery     COLONOSCOPY  12/99, 12/02, 3/05, 12/08, 3/13, 8/28    tubular adenoma - 6 mm polyp, due 5 yrs     EYE SURGERY Left 02/2015    VRS - left eye surgery     EYE SURGERY  02/2014    Retinal tear     HC REMOVE TONSILS/ADENOIDS,<13 Y/O  1964    T & A <12y.o.     ORTHOPEDIC SURGERY Right 01/2003    Right toe fusion, miniscus artho - right knee     STRESS ECHO ([facility])  10/2008    normal     SURGICAL HISTORY OF -  Right 09/2010    Rt Knee Arthroscopy - Dr Curtis     TOE SURGERY Right 01/2003    rt great toe fusion   *  *  Current Outpatient Medications   Medication Sig Dispense Refill     ASPIRIN 81 MG OR TABS 1 TABLET DAILY       MULTIVITAMIN OR None Entered       Omega-3 Krill Oil 500 MG CAPS        simvastatin (ZOCOR) 20 MG tablet Take 1 tablet (20 mg) by mouth At Bedtime 90 tablet 3     triamcinolone (NASACORT) 55 MCG/ACT nasal aerosol Spray 2 sprays into both nostrils daily 1 Bottle 11       ROS:     A 10-point review of systems was performed and is positive for that noted above in the HPI and as seen below.  All other areas are negative.     Numbness in feet?  Slightly after surgery   Calf pain with walking? no  Recent foot/ankle injury? no  Weight change over past 12 months? 5# gain  Self perception as overweight? yes  Recent flu-like symptoms? no  Joint pain other than feet ? 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;  Tobacco use:  no  Social History     Socioeconomic History     Marital status:      Spouse name: Arabella     Number of children: 2     Years of education: 18     Highest education level: Not on file   Occupational History     Employer: [employer]   Social Needs     Financial resource strain: Not on file     Food insecurity     Worry: Not on file     Inability: Not on file      Transportation needs     Medical: Not on file     Non-medical: Not on file   Tobacco Use     Smoking status: Former Smoker     Packs/day: 0.00     Years: 0.00     Pack years: 0.00     Types: Cigars     Smokeless tobacco: Never Used     Tobacco comment: Occasional cigar   Substance and Sexual Activity     Alcohol use: Yes     Alcohol/week: 4.0 - 8.0 standard drinks     Comment: avg 0-12 beers per week     Drug use: No     Sexual activity: Yes     Partners: Female   Lifestyle     Physical activity     Days per week: Not on file     Minutes per session: Not on file     Stress: Not on file   Relationships     Social connections     Talks on phone: Not on file     Gets together: Not on file     Attends Bahai service: Not on file     Active member of club or organization: Not on file     Attends meetings of clubs or organizations: Not on file     Relationship status: Not on file     Intimate partner violence     Fear of current or ex partner: Not on file     Emotionally abused: Not on file     Physically abused: Not on file     Forced sexual activity: Not on file   Other Topics Concern      Service Not Asked     Blood Transfusions Not Asked     Caffeine Concern Yes     Comment: 2 cups qd     Occupational Exposure Not Asked     Hobby Hazards Not Asked     Sleep Concern Not Asked     Stress Concern Not Asked     Weight Concern Not Asked     Special Diet Not Asked     Back Care Not Asked     Exercise Yes     Comment: 5-6 days per week     Bike Helmet Not Asked     Seat Belt Yes     Self-Exams Not Asked     Parent/sibling w/ CABG, MI or angioplasty before 65F 55M? No   Social History Narrative     Not on file       Family history:  Family History   Problem Relation Age of Onset     Cancer Father         lymphoma - stage 1E - ? aggressive type -      Alzheimer Disease Father      Cerebrovascular Disease Father          2016     Obesity Mother      Hypertension Mother      C.A.D. Paternal Grandfather  "         at age 56     Cancer - colorectal Paternal Grandmother         40's     Colon Cancer Paternal Grandmother         Colon surgery after WWII     Cerebrovascular Disease Maternal Grandfather          at age 79     C.A.D. Maternal Grandfather        Rheumatoid arthritis:  no  Foot Problems: no  Diabetes: no      EXAM:    Vitals: /88   Ht 1.829 m (6')   Wt 102.1 kg (225 lb)   BMI 30.52 kg/m    BMI: Body mass index is 30.52 kg/m .  Height: 6' 0\"    Constitutional/ general:  Pt is in no apparent distress, appears well-nourished.  Cooperative with history and physical exam.     Vascular:  Pedal pulses are palpable bilaterally for both the DP and PT arteries.  CFT < 3 sec.  No edema.  Pedal hair growth noted.     Neuro:  Alert and oriented x 3. Coordinated gait.  Light touch sensation is intact to the L4, L5, S1 distributions. No obvious deficits.  No evidence of neurological-based weakness, spasticity, or contracture in the lower extremities.     Derm: Normal texture and turgor.  No erythema, ecchymosis, or cyanosis.  No open lesions.     Musculoskeletal:    Lower extremity muscle strength is normal.  Patient is ambulatory without an assistive device or brace . The right 1st metatarsophalangeal joint fusion is solid. He has a semi-rigid flexion deformity of the hallux.  This joint has limited range of motion.    Radiographic Exam:  X-Ray Findings: I personally reviewed the right great toe films.    Hallux flexion. Hallux interphalangeal joint arthritis. The bone is prominent, dorsal medially near the joint.              Again, thank you for allowing me to participate in the care of your patient.        Sincerely,        Jayden Lind DPM    "

## 2020-12-02 NOTE — PATIENT INSTRUCTIONS
Thank you for choosing [facility]iat[facility] / Foot & Ankle Surgery!    DR. SHAW'S CLINIC LOCATIONS     [facility] SURGERY: [phone]   [address] [place] [address] APPOINTMENTS: [phone]   [place] [address] BILLING QUESTIONS: [phone]  [phone] RADIOLOGY: [phone]       [place] [id number]    [place], [place] [address] [phone]  [phone]      Follow up:     Next steps:     Please read through the following handouts and if you have any questions, please feel free to call us or send a [employer] message!

## 2021-03-24 ENCOUNTER — IMMUNIZATION (OUTPATIENT)
Dept: FAMILY MEDICINE | Facility: CLINIC | Age: 61
End: 2021-03-24
Payer: COMMERCIAL

## 2021-03-24 PROCEDURE — 0011A PR COVID VAC MODERNA 100 MCG/0.5 ML IM: CPT

## 2021-03-24 PROCEDURE — 91301 PR COVID VAC MODERNA 100 MCG/0.5 ML IM: CPT

## 2021-04-21 ENCOUNTER — IMMUNIZATION (OUTPATIENT)
Dept: FAMILY MEDICINE | Facility: CLINIC | Age: 61
End: 2021-04-21
Attending: FAMILY MEDICINE
Payer: COMMERCIAL

## 2021-04-21 PROCEDURE — 0012A PR COVID VAC MODERNA 100 MCG/0.5 ML IM: CPT

## 2021-04-21 PROCEDURE — 91301 PR COVID VAC MODERNA 100 MCG/0.5 ML IM: CPT

## 2021-08-27 ENCOUNTER — ALLIED HEALTH/NURSE VISIT (OUTPATIENT)
Dept: FAMILY MEDICINE | Facility: CLINIC | Age: 61
End: 2021-08-27
Payer: COMMERCIAL

## 2021-08-27 DIAGNOSIS — Z23 NEED FOR VACCINATION: Primary | ICD-10-CM

## 2021-08-27 PROCEDURE — 90715 TDAP VACCINE 7 YRS/> IM: CPT

## 2021-08-27 PROCEDURE — 90471 IMMUNIZATION ADMIN: CPT

## 2021-08-27 PROCEDURE — 99207 PR NO CHARGE NURSE ONLY: CPT

## 2021-09-18 ENCOUNTER — HEALTH MAINTENANCE LETTER (OUTPATIENT)
Age: 61
End: 2021-09-18

## 2021-11-07 ASSESSMENT — ENCOUNTER SYMPTOMS
COUGH: 0
ARTHRALGIAS: 0
MYALGIAS: 0
DIZZINESS: 0
CONSTIPATION: 0
CHILLS: 0
NAUSEA: 0
FREQUENCY: 0
SORE THROAT: 0
DIARRHEA: 0
FEVER: 0
HEADACHES: 0
DYSURIA: 0
HEARTBURN: 0
JOINT SWELLING: 0
ABDOMINAL PAIN: 0
HEMATOCHEZIA: 0
HEMATURIA: 0
PALPITATIONS: 0
PARESTHESIAS: 0
EYE PAIN: 0
SHORTNESS OF BREATH: 0
WEAKNESS: 0
NERVOUS/ANXIOUS: 0

## 2021-11-08 ENCOUNTER — OFFICE VISIT (OUTPATIENT)
Dept: FAMILY MEDICINE | Facility: CLINIC | Age: 61
End: 2021-11-08
Payer: COMMERCIAL

## 2021-11-08 VITALS
SYSTOLIC BLOOD PRESSURE: 128 MMHG | DIASTOLIC BLOOD PRESSURE: 84 MMHG | BODY MASS INDEX: 31.15 KG/M2 | WEIGHT: 230 LBS | HEART RATE: 64 BPM | HEIGHT: 72 IN | OXYGEN SATURATION: 97 % | RESPIRATION RATE: 10 BRPM | TEMPERATURE: 98.1 F

## 2021-11-08 DIAGNOSIS — Z82.49 FAMILY HISTORY OF ISCHEMIC HEART DISEASE AND OTHER DISEASES OF THE CIRCULATORY SYSTEM: ICD-10-CM

## 2021-11-08 DIAGNOSIS — E66.811 CLASS 1 OBESITY WITHOUT SERIOUS COMORBIDITY WITH BODY MASS INDEX (BMI) OF 31.0 TO 31.9 IN ADULT, UNSPECIFIED OBESITY TYPE: ICD-10-CM

## 2021-11-08 DIAGNOSIS — Z86.0100 HISTORY OF COLONIC POLYPS: ICD-10-CM

## 2021-11-08 DIAGNOSIS — Z12.5 SCREENING FOR PROSTATE CANCER: ICD-10-CM

## 2021-11-08 DIAGNOSIS — Z51.81 MEDICATION MONITORING ENCOUNTER: ICD-10-CM

## 2021-11-08 DIAGNOSIS — K21.9 GASTROESOPHAGEAL REFLUX DISEASE WITHOUT ESOPHAGITIS: ICD-10-CM

## 2021-11-08 DIAGNOSIS — Z23 NEED FOR INFLUENZA VACCINATION: ICD-10-CM

## 2021-11-08 DIAGNOSIS — Z23 HIGH PRIORITY FOR 2019-NCOV VACCINE: ICD-10-CM

## 2021-11-08 DIAGNOSIS — Z12.11 SCREEN FOR COLON CANCER: ICD-10-CM

## 2021-11-08 DIAGNOSIS — D12.6 TUBULAR ADENOMA OF COLON: ICD-10-CM

## 2021-11-08 DIAGNOSIS — E78.5 HYPERLIPIDEMIA LDL GOAL <130: ICD-10-CM

## 2021-11-08 DIAGNOSIS — Z00.00 ROUTINE GENERAL MEDICAL EXAMINATION AT A HEALTH CARE FACILITY: Primary | ICD-10-CM

## 2021-11-08 DIAGNOSIS — G47.33 OBSTRUCTIVE SLEEP APNEA SYNDROME: ICD-10-CM

## 2021-11-08 LAB
ALBUMIN SERPL-MCNC: 3.9 G/DL (ref 3.4–5)
ALBUMIN UR-MCNC: NEGATIVE MG/DL
ALP SERPL-CCNC: 69 U/L (ref 40–150)
ALT SERPL W P-5'-P-CCNC: 50 U/L (ref 0–70)
ANION GAP SERPL CALCULATED.3IONS-SCNC: 7 MMOL/L (ref 3–14)
APPEARANCE UR: CLEAR
AST SERPL W P-5'-P-CCNC: 28 U/L (ref 0–45)
BILIRUB SERPL-MCNC: 1 MG/DL (ref 0.2–1.3)
BILIRUB UR QL STRIP: NEGATIVE
BUN SERPL-MCNC: 14 MG/DL (ref 7–30)
CALCIUM SERPL-MCNC: 9.1 MG/DL (ref 8.5–10.1)
CHLORIDE BLD-SCNC: 108 MMOL/L (ref 94–109)
CHOLEST SERPL-MCNC: 187 MG/DL
CK SERPL-CCNC: 135 U/L (ref 30–300)
CO2 SERPL-SCNC: 23 MMOL/L (ref 20–32)
COLOR UR AUTO: YELLOW
CREAT SERPL-MCNC: 0.98 MG/DL (ref 0.66–1.25)
CREAT UR-MCNC: 171 MG/DL
ERYTHROCYTE [DISTWIDTH] IN BLOOD BY AUTOMATED COUNT: 12.6 % (ref 10–15)
FASTING STATUS PATIENT QL REPORTED: YES
GFR SERPL CREATININE-BSD FRML MDRD: 83 ML/MIN/1.73M2
GLUCOSE BLD-MCNC: 85 MG/DL (ref 70–99)
GLUCOSE UR STRIP-MCNC: NEGATIVE MG/DL
HCT VFR BLD AUTO: 45.5 % (ref 40–53)
HDLC SERPL-MCNC: 49 MG/DL
HGB BLD-MCNC: 15.5 G/DL (ref 13.3–17.7)
HGB UR QL STRIP: NEGATIVE
KETONES UR STRIP-MCNC: NEGATIVE MG/DL
LDLC SERPL CALC-MCNC: 105 MG/DL
LEUKOCYTE ESTERASE UR QL STRIP: NEGATIVE
MCH RBC QN AUTO: 30.7 PG (ref 26.5–33)
MCHC RBC AUTO-ENTMCNC: 34.1 G/DL (ref 31.5–36.5)
MCV RBC AUTO: 90 FL (ref 78–100)
MICROALBUMIN UR-MCNC: 9 MG/L
MICROALBUMIN/CREAT UR: 5.26 MG/G CR (ref 0–17)
NITRATE UR QL: NEGATIVE
NONHDLC SERPL-MCNC: 138 MG/DL
PH UR STRIP: 7.5 [PH] (ref 5–7)
PLATELET # BLD AUTO: 278 10E3/UL (ref 150–450)
POTASSIUM BLD-SCNC: 4 MMOL/L (ref 3.4–5.3)
PROT SERPL-MCNC: 7.6 G/DL (ref 6.8–8.8)
PSA SERPL-MCNC: 2.1 UG/L (ref 0–4)
RBC # BLD AUTO: 5.05 10E6/UL (ref 4.4–5.9)
SODIUM SERPL-SCNC: 138 MMOL/L (ref 133–144)
SP GR UR STRIP: 1.02 (ref 1–1.03)
TRIGL SERPL-MCNC: 166 MG/DL
TSH SERPL DL<=0.005 MIU/L-ACNC: 1.34 MU/L (ref 0.4–4)
UROBILINOGEN UR STRIP-ACNC: 0.2 E.U./DL
WBC # BLD AUTO: 6.8 10E3/UL (ref 4–11)

## 2021-11-08 PROCEDURE — G0103 PSA SCREENING: HCPCS | Performed by: FAMILY MEDICINE

## 2021-11-08 PROCEDURE — 84443 ASSAY THYROID STIM HORMONE: CPT | Performed by: FAMILY MEDICINE

## 2021-11-08 PROCEDURE — 99396 PREV VISIT EST AGE 40-64: CPT | Mod: 25 | Performed by: FAMILY MEDICINE

## 2021-11-08 PROCEDURE — 90471 IMMUNIZATION ADMIN: CPT | Performed by: FAMILY MEDICINE

## 2021-11-08 PROCEDURE — 80061 LIPID PANEL: CPT | Performed by: FAMILY MEDICINE

## 2021-11-08 PROCEDURE — 81003 URINALYSIS AUTO W/O SCOPE: CPT | Performed by: FAMILY MEDICINE

## 2021-11-08 PROCEDURE — 91306 COVID-19,PF,MODERNA (18+ YRS BOOSTER .25ML): CPT | Performed by: FAMILY MEDICINE

## 2021-11-08 PROCEDURE — 82043 UR ALBUMIN QUANTITATIVE: CPT | Performed by: FAMILY MEDICINE

## 2021-11-08 PROCEDURE — 90682 RIV4 VACC RECOMBINANT DNA IM: CPT | Performed by: FAMILY MEDICINE

## 2021-11-08 PROCEDURE — 85027 COMPLETE CBC AUTOMATED: CPT | Performed by: FAMILY MEDICINE

## 2021-11-08 PROCEDURE — 82550 ASSAY OF CK (CPK): CPT | Performed by: FAMILY MEDICINE

## 2021-11-08 PROCEDURE — 0064A COVID-19,PF,MODERNA (18+ YRS BOOSTER .25ML): CPT | Performed by: FAMILY MEDICINE

## 2021-11-08 PROCEDURE — 80053 COMPREHEN METABOLIC PANEL: CPT | Performed by: FAMILY MEDICINE

## 2021-11-08 PROCEDURE — 36415 COLL VENOUS BLD VENIPUNCTURE: CPT | Performed by: FAMILY MEDICINE

## 2021-11-08 RX ORDER — SIMVASTATIN 20 MG
20 TABLET ORAL AT BEDTIME
Qty: 90 TABLET | Refills: 3 | Status: SHIPPED | OUTPATIENT
Start: 2021-11-08 | End: 2021-11-23

## 2021-11-08 SDOH — HEALTH STABILITY: PHYSICAL HEALTH: ON AVERAGE, HOW MANY DAYS PER WEEK DO YOU ENGAGE IN MODERATE TO STRENUOUS EXERCISE (LIKE A BRISK WALK)?: 3 DAYS

## 2021-11-08 SDOH — ECONOMIC STABILITY: TRANSPORTATION INSECURITY
IN THE PAST 12 MONTHS, HAS LACK OF TRANSPORTATION KEPT YOU FROM MEETINGS, WORK, OR FROM GETTING THINGS NEEDED FOR DAILY LIVING?: NO

## 2021-11-08 SDOH — ECONOMIC STABILITY: TRANSPORTATION INSECURITY
IN THE PAST 12 MONTHS, HAS THE LACK OF TRANSPORTATION KEPT YOU FROM MEDICAL APPOINTMENTS OR FROM GETTING MEDICATIONS?: NO

## 2021-11-08 SDOH — HEALTH STABILITY: PHYSICAL HEALTH: ON AVERAGE, HOW MANY MINUTES DO YOU ENGAGE IN EXERCISE AT THIS LEVEL?: 40 MIN

## 2021-11-08 SDOH — ECONOMIC STABILITY: FOOD INSECURITY: WITHIN THE PAST 12 MONTHS, YOU WORRIED THAT YOUR FOOD WOULD RUN OUT BEFORE YOU GOT MONEY TO BUY MORE.: NEVER TRUE

## 2021-11-08 SDOH — ECONOMIC STABILITY: FOOD INSECURITY: WITHIN THE PAST 12 MONTHS, THE FOOD YOU BOUGHT JUST DIDN'T LAST AND YOU DIDN'T HAVE MONEY TO GET MORE.: NEVER TRUE

## 2021-11-08 SDOH — ECONOMIC STABILITY: INCOME INSECURITY: HOW HARD IS IT FOR YOU TO PAY FOR THE VERY BASICS LIKE FOOD, HOUSING, MEDICAL CARE, AND HEATING?: NOT HARD AT ALL

## 2021-11-08 SDOH — ECONOMIC STABILITY: INCOME INSECURITY: IN THE LAST 12 MONTHS, WAS THERE A TIME WHEN YOU WERE NOT ABLE TO PAY THE MORTGAGE OR RENT ON TIME?: NO

## 2021-11-08 ASSESSMENT — LIFESTYLE VARIABLES
HOW MANY STANDARD DRINKS CONTAINING ALCOHOL DO YOU HAVE ON A TYPICAL DAY: 1 OR 2
HOW OFTEN DO YOU HAVE SIX OR MORE DRINKS ON ONE OCCASION: LESS THAN MONTHLY
HOW OFTEN DO YOU HAVE A DRINK CONTAINING ALCOHOL: 4 OR MORE TIMES A WEEK

## 2021-11-08 ASSESSMENT — SOCIAL DETERMINANTS OF HEALTH (SDOH)
HOW OFTEN DO YOU ATTEND CHURCH OR RELIGIOUS SERVICES?: MORE THAN 4 TIMES PER YEAR
IN A TYPICAL WEEK, HOW MANY TIMES DO YOU TALK ON THE PHONE WITH FAMILY, FRIENDS, OR NEIGHBORS?: MORE THAN THREE TIMES A WEEK
HOW OFTEN DO YOU GET TOGETHER WITH FRIENDS OR RELATIVES?: TWICE A WEEK
DO YOU BELONG TO ANY CLUBS OR ORGANIZATIONS SUCH AS CHURCH GROUPS UNIONS, FRATERNAL OR ATHLETIC GROUPS, OR SCHOOL GROUPS?: NO

## 2021-11-08 ASSESSMENT — MIFFLIN-ST. JEOR: SCORE: 1886.27

## 2021-11-08 ASSESSMENT — PAIN SCALES - GENERAL: PAINLEVEL: NO PAIN (0)

## 2021-11-08 NOTE — PROGRESS NOTES
[facility]  [place]    SUBJECTIVE    CC: Nam May is an 61 year old male who presents for preventative health visit.     Patient has been advised of split billing requirements and indicates understanding: Yes     Healthy Habits:     Getting at least 3 servings of Calcium per day:  Yes    Bi-annual eye exam:  Yes    Dental care twice a year:  Yes    Sleep apnea or symptoms of sleep apnea:  Excessive snoring    Diet:  Regular (no restrictions)    Frequency of exercise:  2-3 days/week    Duration of exercise:  15-30 minutes    Taking medications regularly:  Yes    Medication side effects:  None    PHQ-2 Total Score: 0    Additional concerns today:  Yes    Today's PHQ-2 Score:   PHQ-2 ( 1999 Pfizer) 11/8/2021   Q1: Little interest or pleasure in doing things 0   Q2: Feeling down, depressed or hopeless 0   PHQ-2 Score 0   Q1: Little interest or pleasure in doing things -   Q2: Feeling down, depressed or hopeless -   PHQ-2 Score -     Abuse: Current or Past(Physical, Sexual or Emotional)- NO  Do you feel safe in your environment? YES    Social History     Tobacco Use     Smoking status: Former Smoker     Packs/day: 0.00     Years: 0.00     Pack years: 0.00     Types: Cigars     Smokeless tobacco: Never Used     Tobacco comment: Occasional cigar   Substance Use Topics     Alcohol use: Yes     Alcohol/week: 8.0 standard drinks     Types: 8 Standard drinks or equivalent per week     Comment: avg 0-12 beers per week     If you drink alcohol do you typically have >3 drinks per day or >7 drinks per week? Yes    AUDIT - Alcohol Use Disorders Identification Test - Reproduced from the World Health Organization Audit 2001 (Second Edition) 11/7/2021   1.  How often do you have a drink containing alcohol? 4 or more times a week   2.  How many drinks containing alcohol do you have on a typical day when you are drinking? 1 or 2   3.  How often do you have five or more drinks on one occasion? Never   4.  How often  during the last year have you found that you were not able to stop drinking once you had started? Never   5.  How often during the last year have you failed to do what was normally expected of you because of drinking? Never   6.  How often during the last year have you needed a first drink in the morning to get yourself going after a heavy drinking session? Never   7.  How often during the last year have you had a feeling of guilt or remorse after drinking? Never   8.  How often during the last year have you been unable to remember what happened the night before because of your drinking? Never   9.  Have you or someone else been injured because of your drinking? No   10. Has a relative, friend, doctor or other health care worker been concerned about your drinking or suggested you cut down? No   TOTAL SCORE 4       Last PSA:   PSA   Date Value Ref Range Status   11/19/2020 1.43 0 - 4 ug/L Final     Comment:     Assay Method:  Chemiluminescence using Siemens Vista analyzer       Reviewed orders with patient. Reviewed health maintenance and updated orders accordingly - Yes    Hyperlipidemia Follow-Up      Are you regularly taking any medication or supplement to lower your cholesterol?   Yes- Simvastatin    Are you having muscle aches or other side effects that you think could be caused by your cholesterol lowering medication?  No    Recent Labs   Lab Test 11/19/20  0743 03/25/19  1417 08/31/16  0821 06/05/15  0828 05/02/14  0843   CHOL 205* 190   < > 174 163   HDL 51 47   < > 50 42   * 119*   < > 101 96   TRIG 119 120   < > 116 126   CHOLHDLRATIO  --   --   --  3.5 3.9    < > = values in this interval not displayed.     BRAXTON - good    GERD - no issues    Concerned about weight    Wt Readings from Last 4 Encounters:   11/08/21 104.3 kg (230 lb)   12/02/20 102.1 kg (225 lb)   11/19/20 104.3 kg (230 lb)   01/13/20 107.5 kg (237 lb)     Notes good diet    Left ear/sinus/teeth - allergy symptoms, closed up  sensation    Umbilical hernia - no symptoms    FH hx of circulation issues      Health Maintenance     Colonoscopy:  Due 8/2023   FIT:  given              PSA:  done   DEXA:  NA    Health Maintenance Due   Topic Date Due     LUNG CANCER SCREENING  Never done     PHQ-2  01/01/2021     PSA  11/19/2021       Current Problem List    Patient Active Problem List   Diagnosis     History of colonic polyps     GERD (gastroesophageal reflux disease)     Hyperlipidemia LDL goal <130     Seasonal allergies     Sleep apnea     ED (erectile dysfunction)     Retinal tear of left eye     Right knee pain     Hip pain, right     Tubular adenoma of colon- repeat colonoscopy in 2023 ( 5 years)        Past Medical History    Past Medical History:   Diagnosis Date     Colon polyp 08/2018    6 mm polyp     ED (erectile dysfunction) 2013    mild - Dr Cotto     Family history of malignant neoplasm of gastrointestinal tract     PGM - colon ca     GERD (gastroesophageal reflux disease)      Hyperlipidemia LDL goal <130 2008     Personal history of colonic polyps 12/99, 12/02, 3/05, 12/08    adenoma, tubular adenoma      Retinal tear of left eye 2/15    VRS     Seasonal allergies summer/fall    Allergic rhinitis H/O AIT - ragweed     Sleep apnea 4/11    severe - CPAP       Past Surgical History    Past Surgical History:   Procedure Laterality Date     ARTHROSCOPY KNEE WITH MENISCECTOMY Right 07/2010    Meniscus tear surgery     COLONOSCOPY  12/99, 12/02, 3/05, 12/08, 3/13, 8/28    tubular adenoma - 6 mm polyp, due 5 yrs - 8/2023     EYE SURGERY Left 02/2015    VRS - left eye surgery     EYE SURGERY  02/2014    Retinal tear     HC REMOVE TONSILS/ADENOIDS,<11 Y/O  1964    T & A <12y.o.     ORTHOPEDIC SURGERY Right 01/2003    Right toe fusion, miniscus artho - right knee     STRESS ECHO (METRO)  10/2008    normal     SURGICAL HISTORY OF -  Right 09/2010    Rt Knee Arthroscopy - Dr Curtis     TOE SURGERY Right 01/2003    rt great toe fusion        Current Medications    Current Outpatient Medications   Medication Sig Dispense Refill     simvastatin (ZOCOR) 20 MG tablet Take 1 tablet (20 mg) by mouth At Bedtime 90 tablet 3     ASPIRIN 81 MG OR TABS 1 TABLET DAILY       MULTIVITAMIN OR None Entered       Omega-3 Krill Oil 500 MG CAPS        triamcinolone (NASACORT) 55 MCG/ACT nasal aerosol Spray 2 sprays into both nostrils daily 1 Bottle 11       Allergies    No Known Allergies    Immunizations    Immunization History   Administered Date(s) Administered     COVID-19,PF,Moderna 2021, 2021     COVID-19,PF,Moderna Booster 2021     Influenza (IIV3) PF 10/25/2013, 10/05/2020     Influenza Quad, Recombinant, pf(RIV4) (Flublok) 2021     Influenza Vaccine IM > 6 months Valent IIV4 (Alfuria,Fluzone) 12/15/2017     MMR 08/15/1990     Poliovirus, inactivated (IPV) 2011     TD (ADULT, 7+) 1997, 2003     TDAP Vaccine (Boostrix) 2011     Tdap (Adacel,Boostrix) 2021     Twinrix A/B 2011, 2011, 2011     Typhoid Oral 2011     Zoster vaccine recombinant adjuvanted (SHINGRIX) 2019, 2019       Family History    Family History   Problem Relation Age of Onset     Obesity Mother      Hypertension Mother      Cancer Father         lymphoma - stage 1E - ? aggressive type -      Alzheimer Disease Father      Cerebrovascular Disease Father          2016     Osteoarthritis Brother         bilateral knee replacement     Cerebrovascular Disease Maternal Grandfather          at age 79     C.A.D. Maternal Grandfather      Cancer - colorectal Paternal Grandmother         40's     Colon Cancer Paternal Grandmother         Colon surgery after WWII     C.A.D. Paternal Grandfather          at age 56       Social History    Social History     Socioeconomic History     Marital status:      Spouse name: Arabella     Number of children: 2     Years of education: 18     Highest  education level: Not on file   Occupational History     Employer: [employer]   Tobacco Use     Smoking status: Former Smoker     Packs/day: 0.00     Years: 0.00     Pack years: 0.00     Types: Cigars     Smokeless tobacco: Never Used     Tobacco comment: Occasional cigar   Vaping Use     Vaping Use: Never used   Substance and Sexual Activity     Alcohol use: Yes     Alcohol/week: 8.0 standard drinks     Types: 8 Standard drinks or equivalent per week     Comment: avg 0-12 beers per week     Drug use: No     Sexual activity: Yes     Partners: Female   Other Topics Concern      Service Not Asked     Blood Transfusions Not Asked     Caffeine Concern Yes     Comment: 2 cups qd     Occupational Exposure Not Asked     Hobby Hazards Not Asked     Sleep Concern Not Asked     Stress Concern Not Asked     Weight Concern Not Asked     Special Diet Not Asked     Back Care Not Asked     Exercise Yes     Comment: 5-6 days per week     Bike Helmet Not Asked     Seat Belt Yes     Self-Exams Not Asked     Parent/sibling w/ CABG, MI or angioplasty before 65F 55M? No   Social History Narrative     Not on file     Social Determinants of Health     Financial Resource Strain: Low Risk      Difficulty of Paying Living Expenses: Not hard at all   Food Insecurity: No Food Insecurity     Worried About Running Out of Food in the Last Year: Never true     Ran Out of Food in the Last Year: Never true   Transportation Needs: No Transportation Needs     Lack of Transportation (Medical): No     Lack of Transportation (Non-Medical): No   Physical Activity: Insufficiently Active     Days of Exercise per Week: 3 days     Minutes of Exercise per Session: 40 min   Stress: No Stress Concern Present     Feeling of Stress : Not at all   Social Connections: Moderately Integrated     Frequency of Communication with Friends and Family: More than three times a week     Frequency of Social Gatherings with Friends and Family: Twice a week     Attends  Holiness Services: More than 4 times per year     Active Member of Clubs or Organizations: No     Attends Club or Organization Meetings: Not on file     Marital Status:    Intimate Partner Violence: Not on file   Housing Stability: Low Risk      Unable to Pay for Housing in the Last Year: No     Number of Places Lived in the Last Year: 2     Unstable Housing in the Last Year: No       ROS    CONSTITUTIONAL: NEGATIVE for fever, chills, change in weight  INTEGUMENTARY/SKIN: NEGATIVE for worrisome rashes, moles or lesions  EYES: NEGATIVE for vision changes or irritation  ENT/MOUTH: NEGATIVE for ear, mouth and throat problems  RESP: NEGATIVE for significant cough or SOB  BREAST: NEGATIVE for masses, tenderness or discharge  CV: NEGATIVE for chest pain, palpitations or peripheral edema  GI: NEGATIVE for nausea, abdominal pain, heartburn, or change in bowel habits  : NEGATIVE for frequency, dysuria, or hematuria  MUSCULOSKELETAL: NEGATIVE for significant arthralgias or myalgia  NEURO: NEGATIVE for weakness, dizziness or paresthesias  ENDOCRINE: NEGATIVE for temperature intolerance, skin/hair changes  HEME: NEGATIVE for bleeding problems  PSYCHIATRIC: NEGATIVE for changes in mood or affect    OBJECTIVE    /84   Pulse 64   Temp 98.1  F (36.7  C) (Tympanic)   Resp 10   Ht 1.829 m (6')   Wt 104.3 kg (230 lb)   SpO2 97%   BMI 31.19 kg/m      EXAM:    GENERAL: healthy, alert and no distress  EYES: Eyes grossly normal to inspection, PERRL and conjunctivae and sclerae normal  HENT: ear canals and TM's normal, nose and mouth without ulcers or lesions  NECK: no adenopathy, no asymmetry, masses, or scars and thyroid normal to palpation  RESP: lungs clear to auscultation - no rales, rhonchi or wheezes  CV: regular rate and rhythm, normal S1 S2, no S3 or S4, no murmur, click or rub, no peripheral edema and peripheral pulses strong  ABDOMEN: soft, nontender, no hepatosplenomegaly, no masses and bowel sounds  normal   (male): testicles normal without atrophy or masses, no hernias and penis normal without urethral discharge  RECTAL: normal sphincter tone, no rectal masses and prostate of normal size for age, smooth, nontender without masses/nodules  MS: no gross musculoskeletal defects noted, no edema  SKIN: no suspicious lesions or rashes  NEURO: Normal strength and tone, mentation intact and speech normal  PSYCH: mentation appears normal, affect normal/bright  LYMPH: no cervical, supraclavicular, axillary, or inguinal adenopathy    DIAGNOSTICS/PROCEDURES    Pending    ASSESSMENT      ICD-10-CM    1. Routine general medical examination at a health care facility  Z00.00 Comprehensive metabolic panel     Lipid panel reflex to direct LDL Fasting     CBC with platelets     CK total     UA reflex to Microscopic and Culture     Albumin Random Urine Quantitative with Creat Ratio     TSH with free T4 reflex     Prostate Specific Antigen Screen     Fecal colorectal cancer screen FIT     INFLUENZA QUAD, RECOMBINANT, P-FREE (RIV4) (FLUBLOK)     REVIEW OF HEALTH MAINTENANCE PROTOCOL ORDERS     SLEEP EVALUATION & MANAGEMENT REFERRAL - ADULT -     Comprehensive metabolic panel     Lipid panel reflex to direct LDL Fasting     CBC with platelets     CK total     UA reflex to Microscopic and Culture     Albumin Random Urine Quantitative with Creat Ratio     TSH with free T4 reflex     Prostate Specific Antigen Screen     Fecal colorectal cancer screen FIT   2. Hyperlipidemia LDL goal <130  E78.5 REVIEW OF HEALTH MAINTENANCE PROTOCOL ORDERS     simvastatin (ZOCOR) 20 MG tablet   3. Obstructive sleep apnea syndrome  G47.33 TSH with free T4 reflex     REVIEW OF HEALTH MAINTENANCE PROTOCOL ORDERS     TSH with free T4 reflex   4. Gastroesophageal reflux disease without esophagitis  K21.9 CBC with platelets     REVIEW OF HEALTH MAINTENANCE PROTOCOL ORDERS     CBC with platelets   5. Family history of ischemic heart disease and other diseases  of the circulatory system  Z82.49 US Carotid Bilateral     US Abdominal Aorta Imaging   6. Class 1 obesity without serious comorbidity with body mass index (BMI) of 31.0 to 31.9 in adult, unspecified obesity type  E66.9     Z68.31    7. History of colonic polyps  Z86.010 Fecal colorectal cancer screen FIT     REVIEW OF HEALTH MAINTENANCE PROTOCOL ORDERS     Fecal colorectal cancer screen FIT   8. Tubular adenoma of colon- repeat colonoscopy in 2023 ( 5 years)   D12.6 Fecal colorectal cancer screen FIT     REVIEW OF HEALTH MAINTENANCE PROTOCOL ORDERS     Fecal colorectal cancer screen FIT   9. Screen for colon cancer  Z12.11 Fecal colorectal cancer screen FIT     REVIEW OF HEALTH MAINTENANCE PROTOCOL ORDERS     Fecal colorectal cancer screen FIT   10. Screening for prostate cancer  Z12.5 Prostate Specific Antigen Screen     REVIEW OF HEALTH MAINTENANCE PROTOCOL ORDERS     Prostate Specific Antigen Screen   11. Medication monitoring encounter  Z51.81 Comprehensive metabolic panel     Lipid panel reflex to direct LDL Fasting     CBC with platelets     CK total     UA reflex to Microscopic and Culture     Albumin Random Urine Quantitative with Creat Ratio     TSH with free T4 reflex     REVIEW OF HEALTH MAINTENANCE PROTOCOL ORDERS     Comprehensive metabolic panel     Lipid panel reflex to direct LDL Fasting     CBC with platelets     CK total     UA reflex to Microscopic and Culture     Albumin Random Urine Quantitative with Creat Ratio     TSH with free T4 reflex   12. Need for influenza vaccination  Z23 INFLUENZA QUAD, RECOMBINANT, P-FREE (RIV4) (FLUBLOK)     REVIEW OF HEALTH MAINTENANCE PROTOCOL ORDERS   13. High priority for 2019-nCoV vaccine  Z23 COVID-19,PF,MODERNA (18+ Yrs BOOSTER .25mL)       PLAN    Discussed treatment/modality options, including risk and benefits, he desires:    advised alcohol consumption 1oz per day or less, advised aspirin 81 mg po daily, advised 1 multivitamin per day, advised calcium  9379-9521 mg/d and Vitamin D 800-1200 IU/d, advised dentist every 6 months, advised diet, exercise, and weight loss, advised opthalmologist every 1-2 years, advised self testicular exam q month, further health care maintenance, further lab(s), immunization(s), medication refill(s) and observation    Discussed controversies surrounding PSA. Specifically reviewed 2017 USPSTF findings recommending discussion of PSA testing for men ages 55-69.  Reviewed findings of the  Randomized Study of Screening for Prostate Cancer which showed a 30% reduction in advanced stage prostate cancer and a 20% reduction in death rate from prostate cancer in this age group. PSA-based screening may prevent up to 2 deaths and up to 3 cases of metastatic disease per 1,000 men screened over 13 years.    We've elected to do PSA this year after discussing these controversies.    All diagnosis above reviewed and noted above, otherwise stable.      See Loop orders for further details.      1) med refills    2) labs pending    3) immunizations - moderna/flu    4) Carotid/AAA US FH    5) consider Surgery - umbilical    Return for Complete Physical, Medication Recheck Visit, Follow Up Chronic.    Health Maintenance Due   Topic Date Due     LUNG CANCER SCREENING  Never done     PHQ-2  01/01/2021     PSA  11/19/2021       COUNSELING    Reviewed preventive health counseling, as reflected in patient instructions    BP Readings from Last 1 Encounters:   11/08/21 128/84     Estimated body mass index is 31.19 kg/m  as calculated from the following:    Height as of this encounter: 1.829 m (6').    Weight as of this encounter: 104.3 kg (230 lb).      Weight management plan: diet and exercise     reports that he has quit smoking. His smoking use included cigars. He smoked 0.00 packs per day for 0.00 years. He has never used smokeless tobacco.      Counseling Resources:    ATP IV Guidelines  Pooled Cohorts Equation Calculator  FRAX Risk  Assessment  ICSI Preventive Guidelines  Dietary Guidelines for Americans, 2010  [employer]'s MyPlate  ASA Prophylaxis  Lung CA Screening           Juan C Palacio MD, FAAFP     [facility]  [address] [place] [id number]  [email or web address]   Office: [phone]  Fax: [phone]  Pager: [phone]     Answers for HPI/ROS submitted by the patient on 11/7/2021  abdominal pain: No  Blood in stool: No  Blood in urine: No  chest pain: No  chills: No  congestion: No  constipation: No  cough: No  diarrhea: No  dizziness: No  ear pain: No  eye pain: No  nervous/anxious: No  fever: No  frequency: No  genital sores: No  headaches: No  hearing loss: Yes  heartburn: No  arthralgias: No  joint swelling: No  peripheral edema: No  mood changes: No  myalgias: No  nausea: No  dysuria: No  palpitations: No  Skin sensation changes: No  sore throat: No  urgency: No  rash: No  shortness of breath: No  visual disturbance: No  weakness: No  impotence: No  penile discharge: No

## 2021-11-08 NOTE — LETTER
[facility]  [address]   [place] [address]  [phone]                    November 22, 2021    Nam May  [address] [address]      Dear Nam,    Here is a summary of your recent test results:    Labs are overall quite good     We advise:     Continue current cares.   Balanced low cholesterol diet.   Regular exercise.     For additional lab test information, [email or web address] is an excellent reference.     Your test results are enclosed.      Please contact me if you have any questions.    In addition, here is a list of due or overdue Health Maintenance reminders.    Health Maintenance Due   Topic Date Due     LUNG CANCER SCREENING  Never done       Please call us at [phone] (or use [employer] to address the above recommendations.            Thank you very much for trusting [facility].     Healthy regards,          Juan C Palacio M.D.        Results for orders placed or performed in visit on 11/08/21   Comprehensive metabolic panel     Status: Normal   Result Value Ref Range    Sodium 138 133 - 144 mmol/L    Potassium 4.0 3.4 - 5.3 mmol/L    Chloride 108 94 - 109 mmol/L    Carbon Dioxide (CO2) 23 20 - 32 mmol/L    Anion Gap 7 3 - 14 mmol/L    Urea Nitrogen 14 7 - 30 mg/dL    Creatinine 0.98 0.66 - 1.25 mg/dL    Calcium 9.1 8.5 - 10.1 mg/dL    Glucose 85 70 - 99 mg/dL    Alkaline Phosphatase 69 40 - 150 U/L    AST 28 0 - 45 U/L    ALT 50 0 - 70 U/L    Protein Total 7.6 6.8 - 8.8 g/dL    Albumin 3.9 3.4 - 5.0 g/dL    Bilirubin Total 1.0 0.2 - 1.3 mg/dL    GFR Estimate 83 >60 mL/min/1.73m2   Lipid panel reflex to direct LDL Fasting     Status: Abnormal   Result Value Ref Range    Cholesterol 187 <200 mg/dL    Triglycerides 166 (H) <150 mg/dL    Direct Measure HDL 49 >=40 mg/dL    LDL Cholesterol Calculated 105 (H) <=100 mg/dL    Non HDL Cholesterol 138 (H) <130 mg/dL    Patient Fasting > 8hrs? Yes     Narrative     Cholesterol  Desirable:  <200 mg/dL    Triglycerides  Normal:  Less than 150 mg/dL  Borderline High:  150-199 mg/dL  High:  200-499 mg/dL  Very High:  Greater than or equal to 500 mg/dL    Direct Measure HDL  Female:  Greater than or equal to 50 mg/dL   Male:  Greater than or equal to 40 mg/dL    LDL Cholesterol  Desirable:  <100mg/dL  Above Desirable:  100-129 mg/dL   Borderline High:  130-159 mg/dL   High:  160-189 mg/dL   Very High:  >= 190 mg/dL    Non HDL Cholesterol  Desirable:  130 mg/dL  Above Desirable:  130-159 mg/dL  Borderline High:  160-189 mg/dL  High:  190-219 mg/dL  Very High:  Greater than or equal to 220 mg/dL   CBC with platelets     Status: Normal   Result Value Ref Range    WBC Count 6.8 4.0 - 11.0 10e3/uL    RBC Count 5.05 4.40 - 5.90 10e6/uL    Hemoglobin 15.5 13.3 - 17.7 g/dL    Hematocrit 45.5 40.0 - 53.0 %    MCV 90 78 - 100 fL    MCH 30.7 26.5 - 33.0 pg    MCHC 34.1 31.5 - 36.5 g/dL    RDW 12.6 10.0 - 15.0 %    Platelet Count 278 150 - 450 10e3/uL   CK total     Status: Normal   Result Value Ref Range     30 - 300 U/L   UA reflex to Microscopic and Culture     Status: Abnormal    Specimen: Urine, Midstream   Result Value Ref Range    Color Urine Yellow Colorless, Straw, Light Yellow, Yellow    Appearance Urine Clear Clear    Glucose Urine Negative Negative mg/dL    Bilirubin Urine Negative Negative    Ketones Urine Negative Negative mg/dL    Specific Gravity Urine 1.020 1.003 - 1.035    Blood Urine Negative Negative    pH Urine 7.5 (H) 5.0 - 7.0    Protein Albumin Urine Negative Negative mg/dL    Urobilinogen Urine 0.2 0.2, 1.0 E.U./dL    Nitrite Urine Negative Negative    Leukocyte Esterase Urine Negative Negative    Narrative    Microscopic not indicated   Albumin Random Urine Quantitative with Creat Ratio     Status: None   Result Value Ref Range    Creatinine Urine mg/dL 171 mg/dL    Albumin Urine mg/L 9 mg/L    Albumin Urine mg/g Cr 5.26 0.00 - 17.00 mg/g Cr   TSH with free T4  reflex     Status: Normal   Result Value Ref Range    TSH 1.34 0.40 - 4.00 mU/L   Prostate Specific Antigen Screen     Status: Normal   Result Value Ref Range    Prostate Specific Antigen Screen 2.10 0.00 - 4.00 ug/L

## 2021-11-13 ENCOUNTER — TELEPHONE (OUTPATIENT)
Dept: FAMILY MEDICINE | Facility: CLINIC | Age: 61
End: 2021-11-13
Payer: COMMERCIAL

## 2021-11-21 DIAGNOSIS — E78.5 HYPERLIPIDEMIA LDL GOAL <130: ICD-10-CM

## 2021-11-23 RX ORDER — SIMVASTATIN 20 MG
TABLET ORAL
Qty: 90 TABLET | Refills: 3 | Status: SHIPPED | OUTPATIENT
Start: 2021-11-23 | End: 2022-12-07

## 2021-12-02 ENCOUNTER — HOSPITAL ENCOUNTER (OUTPATIENT)
Dept: ULTRASOUND IMAGING | Facility: CLINIC | Age: 61
End: 2021-12-02
Attending: FAMILY MEDICINE
Payer: COMMERCIAL

## 2021-12-02 DIAGNOSIS — Z82.49 FAMILY HISTORY OF ISCHEMIC HEART DISEASE AND OTHER DISEASES OF THE CIRCULATORY SYSTEM: ICD-10-CM

## 2021-12-02 PROCEDURE — 76775 US EXAM ABDO BACK WALL LIM: CPT

## 2021-12-02 PROCEDURE — 93880 EXTRACRANIAL BILAT STUDY: CPT

## 2021-12-03 NOTE — RESULT ENCOUNTER NOTE
Mr. May,    Your carotid ultrasound study is normal.    If you have further questions about the interpretation of your labs, [email or web address] is a good website to check out for further information.    Please contact the clinic if you have additional questions.  Thank you.    Sincerely,    Christiano Charles MD

## 2021-12-23 ENCOUNTER — TELEPHONE (OUTPATIENT)
Dept: FAMILY MEDICINE | Facility: CLINIC | Age: 61
End: 2021-12-23
Payer: COMMERCIAL

## 2021-12-23 NOTE — TELEPHONE ENCOUNTER
Patient calling and requesting to complete his care gap for his lung cancer screening if necessary. He's unsure what exact type of test is needed as he's not a smoker but would like to complete if it's necessary. Please place order.     Refugio Martinez

## 2021-12-23 NOTE — TELEPHONE ENCOUNTER
His smoking history is occasional cigar    To qualify for CT lung screen, minimum requirement is 1 PPD x 20 yrs

## 2021-12-27 NOTE — TELEPHONE ENCOUNTER
Attempt # 1    Called #   Telephone Information:   Mobile [phone]       Left a non detailed VM     Tamiko Pearson RN, BSN  [facility]

## 2021-12-27 NOTE — TELEPHONE ENCOUNTER
Pt calls back.  Advised of below.      He said he does smoke cigarettes.  The last time he had a cigar was August.      He is wondering if this can be removed from his health maintenance then.

## 2022-02-21 ENCOUNTER — TELEPHONE (OUTPATIENT)
Dept: SLEEP MEDICINE | Facility: CLINIC | Age: 62
End: 2022-02-21

## 2022-02-21 ENCOUNTER — VIRTUAL VISIT (OUTPATIENT)
Dept: SLEEP MEDICINE | Facility: CLINIC | Age: 62
End: 2022-02-21
Payer: COMMERCIAL

## 2022-02-21 VITALS — WEIGHT: 220.5 LBS | BODY MASS INDEX: 29.87 KG/M2 | HEIGHT: 72 IN

## 2022-02-21 DIAGNOSIS — G47.33 OBSTRUCTIVE SLEEP APNEA: Primary | ICD-10-CM

## 2022-02-21 DIAGNOSIS — G47.10 HYPERSOMNIA: ICD-10-CM

## 2022-02-21 PROCEDURE — 99203 OFFICE O/P NEW LOW 30 MIN: CPT | Mod: 95 | Performed by: INTERNAL MEDICINE

## 2022-02-21 ASSESSMENT — SLEEP AND FATIGUE QUESTIONNAIRES
HOW LIKELY ARE YOU TO NOD OFF OR FALL ASLEEP WHILE WATCHING TV: SLIGHT CHANCE OF DOZING
HOW LIKELY ARE YOU TO NOD OFF OR FALL ASLEEP IN A CAR, WHILE STOPPED FOR A FEW MINUTES IN TRAFFIC: WOULD NEVER DOZE
HOW LIKELY ARE YOU TO NOD OFF OR FALL ASLEEP WHILE SITTING AND READING: SLIGHT CHANCE OF DOZING
HOW LIKELY ARE YOU TO NOD OFF OR FALL ASLEEP WHILE LYING DOWN TO REST IN THE AFTERNOON WHEN CIRCUMSTANCES PERMIT: WOULD NEVER DOZE
HOW LIKELY ARE YOU TO NOD OFF OR FALL ASLEEP WHILE SITTING INACTIVE IN A PUBLIC PLACE: WOULD NEVER DOZE
HOW LIKELY ARE YOU TO NOD OFF OR FALL ASLEEP WHILE SITTING QUIETLY AFTER LUNCH WITHOUT ALCOHOL: WOULD NEVER DOZE
HOW LIKELY ARE YOU TO NOD OFF OR FALL ASLEEP WHILE SITTING AND TALKING TO SOMEONE: WOULD NEVER DOZE
HOW LIKELY ARE YOU TO NOD OFF OR FALL ASLEEP WHEN YOU ARE A PASSENGER IN A CAR FOR AN HOUR WITHOUT A BREAK: WOULD NEVER DOZE

## 2022-02-21 NOTE — PROGRESS NOTES
Nam is a 61 year old who is being evaluated via a billable video visit.      How would you like to obtain your AVS? MyChart  If the video visit is dropped, the invitation should be resent by: Send to e-mail at: [email or web address]  Will anyone else be joining your video visit? No      Video Start Time: 9:58 AM  Video-Visit Details    Type of service:  Video Visit    Video End Time:10:12 AM    Originating Location (pt. Location): Home    Distant Location (provider location):  [facility]     Platform used for Video Visit: [email or web address]     Additional 15 minutes on the date of service was spent performing the following:    -Preparing to see the patient  -Obtaining and/or reviewing separately obtained history   -Ordering medications, tests, or procedures   -Documenting clinical information in the electronic or other health record     Thank you for the opportunity to participate in the care of  Nam May.    Assessment and Plan:  1. Obstructive sleep apnea/Hypersomnia  Since we already have a copy of the patient's sleep study in our charts, there is no need to repeat the study. I will put in an order for the patient to receive a new CPAP machine from our DME. RTC in 3 months to review compliance download data.  - COMPREHENSIVE DME    Lab reviewed: Discussed with patient.    History of present illness:    He is a 61 year old male who comes to the virtual clinic with a transfer of care of his obstructive sleep apnea.  The patient was diagnosed with obstructive sleep apnea on 03/31/2011 (AHI = 10.9).  The patient was prescribed CPAP therapy and has been doing well until recently when his current CPAP machine was making a squeaking sound.  Without CPAP therapy he would continue to suffer from excessive daytime sleepiness.  He would like to establish care with us so that he can get a new CPAP machine.     Ideal Sleep-Wake Cycle(devoid of societal pressure):    Patient would try to initiate  sleep at around 9:30 PM with a sleep latency of less than 15 minutes. The patient would have rare awakenings. Final wake up time is around 5:30 AM.    SEVERO:  SEVERO Total Score: 1  Total score - Vanlue: 2 (2/21/2022  8:32 AM)      Patient Active Problem List   Diagnosis     History of colonic polyps     GERD (gastroesophageal reflux disease)     Hyperlipidemia LDL goal <130     Seasonal allergies     Sleep apnea     ED (erectile dysfunction)     Retinal tear of left eye     Right knee pain     Hip pain, right     Tubular adenoma of colon- repeat colonoscopy in 2023 ( 5 years)      Past Medical History:   Diagnosis Date     Colon polyp 08/2018    6 mm polyp     ED (erectile dysfunction) 2013    mild - Dr Cotto     Family history of malignant neoplasm of gastrointestinal tract     PGM - colon ca     GERD (gastroesophageal reflux disease)      Hyperlipidemia LDL goal <130 2008     Personal history of colonic polyps 12/99, 12/02, 3/05, 12/08    adenoma, tubular adenoma      Retinal tear of left eye 2/15    VRS     Seasonal allergies summer/fall    Allergic rhinitis H/O AIT - ragweed     Sleep apnea 4/11    severe - CPAP     Past Surgical History:   Procedure Laterality Date     ARTHROSCOPY KNEE WITH MENISCECTOMY Right 07/2010    Meniscus tear surgery     COLONOSCOPY  12/99, 12/02, 3/05, 12/08, 3/13, 8/28    tubular adenoma - 6 mm polyp, due 5 yrs - 8/2023     EYE SURGERY Left 02/2015    VRS - left eye surgery     EYE SURGERY  02/2014    Retinal tear     HC REMOVE TONSILS/ADENOIDS,<13 Y/O  1964    T & A <12y.o.     ORTHOPEDIC SURGERY Right 01/2003    Right toe fusion, miniscus artho - right knee     STRESS ECHO (METRO)  10/2008    normal     SURGICAL HISTORY OF -  Right 09/2010    Rt Knee Arthroscopy - Dr Curtis     TOE SURGERY Right 01/2003    rt great toe fusion     Current Outpatient Medications   Medication Sig Dispense Refill     ASPIRIN 81 MG OR TABS 1 TABLET DAILY       MULTIVITAMIN OR None Entered        Omega-3 Krill Oil 500 MG CAPS        simvastatin (ZOCOR) 20 MG tablet TAKE 1 TABLET(20 MG) BY MOUTH AT BEDTIME 90 tablet 3     triamcinolone (NASACORT) 55 MCG/ACT nasal aerosol Spray 2 sprays into both nostrils daily 1 Bottle 11     Patient has no known allergies.  Social History     Socioeconomic History     Marital status:      Spouse name: Arabella     Number of children: 2     Years of education: 18     Highest education level: Not on file   Occupational History     Employer: [employer]   Tobacco Use     Smoking status: Former Smoker     Packs/day: 0.00     Years: 0.00     Pack years: 0.00     Types: Cigars     Smokeless tobacco: Never Used     Tobacco comment: Occasional cigar   Vaping Use     Vaping Use: Never used   Substance and Sexual Activity     Alcohol use: Yes     Alcohol/week: 8.0 standard drinks     Types: 8 Standard drinks or equivalent per week     Comment: avg 0-12 beers per week     Drug use: No     Sexual activity: Yes     Partners: Female   Other Topics Concern      Service Not Asked     Blood Transfusions Not Asked     Caffeine Concern Yes     Comment: 2 cups qd     Occupational Exposure Not Asked     Hobby Hazards Not Asked     Sleep Concern Not Asked     Stress Concern Not Asked     Weight Concern Not Asked     Special Diet Not Asked     Back Care Not Asked     Exercise Yes     Comment: 5-6 days per week     Bike Helmet Not Asked     Seat Belt Yes     Self-Exams Not Asked     Parent/sibling w/ CABG, MI or angioplasty before 65F 55M? No   Social History Narrative     Not on file     Social Determinants of Health     Financial Resource Strain: Low Risk      Difficulty of Paying Living Expenses: Not hard at all   Food Insecurity: No Food Insecurity     Worried About Running Out of Food in the Last Year: Never true     Ran Out of Food in the Last Year: Never true   Transportation Needs: No Transportation Needs     Lack of Transportation (Medical): No     Lack of Transportation  (Non-Medical): No   Physical Activity: Insufficiently Active     Days of Exercise per Week: 3 days     Minutes of Exercise per Session: 40 min   Stress: No Stress Concern Present     Feeling of Stress : Not at all   Social Connections: Moderately Integrated     Frequency of Communication with Friends and Family: More than three times a week     Frequency of Social Gatherings with Friends and Family: Twice a week     Attends [employer] Services: More than 4 times per year     Active Member of Clubs or Organizations: No     Attends Club or Organization Meetings: Not on file     Marital Status:    Intimate Partner Violence: Not on file   Housing Stability: Low Risk      Unable to Pay for Housing in the Last Year: No     Number of Places Lived in the Last Year: 2     Unstable Housing in the Last Year: No     Family History   Problem Relation Age of Onset     Obesity Mother      Hypertension Mother      Cancer Father         lymphoma - stage 1E - ? aggressive type - 2009     Alzheimer Disease Father      Cerebrovascular Disease Father          2016     Osteoarthritis Brother         bilateral knee replacement     Cerebrovascular Disease Maternal Grandfather          at age 79     C.A.D. Maternal Grandfather      Cancer - colorectal Paternal Grandmother         40's     Colon Cancer Paternal Grandmother         Colon surgery after WWII     C.A.D. Paternal Grandfather          at age 56        Physical Exam:  GEN: NAD,   Head: Normocephalic.  EYES: EOMI  Psych: normal mood, normal affect    Labs/Studies:     No results found for: PH, PHARTERIAL, PO2, [id number], SAT, PCO2, HCO3, BASEEXCESS, GERA, BEB  Lab Results   Component Value Date    TSH 1.34 2021    TSH 1.81 2019     Lab Results   Component Value Date    GLC 85 2021     (H) 2020     Lab Results   Component Value Date    HGB 15.5 2021    HGB 15.9 2020     Lab Results   Component Value Date    BUN 14  11/08/2021    BUN 22 11/19/2020    CR 0.98 11/08/2021    CR 1.03 11/19/2020     Lab Results   Component Value Date    AST 28 11/08/2021    AST 27 11/19/2020    ALT 50 11/08/2021    ALT 47 11/19/2020    ALKPHOS 69 11/08/2021    ALKPHOS 70 11/19/2020    BILITOTAL 1.0 11/08/2021    BILITOTAL 1.0 11/19/2020    BILICONJ 0.0 01/21/2011    BILICONJ 0.0 03/05/2010     No results found for: UAMP, UBARB, BENZODIAZEUR, UCANN, UCOC, OPIT, UPCP    Recent Labs   Lab Test 11/08/21  0939 11/19/20  0743    139   POTASSIUM 4.0 4.4   CHLORIDE 108 108   CO2 23 27   ANIONGAP 7 4   GLC 85 107*   BUN 14 22   CR 0.98 1.03   RHODA 9.1 9.4       No results found for: RAJ Velazquez DO  Board Certified in Internal Medicine and Sleep Medicine    (Note created with Dragon voice recognition and unintended spelling errors and word substitutions may occur)

## 2022-02-21 NOTE — TELEPHONE ENCOUNTER
Pt will need to be re-studied due to full sleep study report (interpretation and data) are not available.  Tried contact clinic where they had sleep study and they no longer have record.    Tried calling pt to explain they will need to be re-studied. No answer and lvm.

## 2022-02-21 NOTE — PATIENT INSTRUCTIONS
Equipment Instructions    We will process your PAP order and send it to a Durable Medical Equipment (DME) provider.    The medical equipment company should call you within 7 days.  If you have not heard from the company, please contact them to see if they received your order and are planning to call you.    Please call us at [phone] if you are unable to contact the medical equipment company or if they do not have the order.    If you are starting a new PAP machine, please call us after you use it the first night to let us know how it went. This call also helps us know that you received your equipment and that everything is ready. Please use our central phone number [phone]    Contact information for [employer] company:    [employer] [facility] Tel: [phone]

## 2022-03-03 ENCOUNTER — ANCILLARY PROCEDURE (OUTPATIENT)
Dept: GENERAL RADIOLOGY | Facility: CLINIC | Age: 62
End: 2022-03-03
Attending: PHYSICIAN ASSISTANT
Payer: COMMERCIAL

## 2022-03-03 ENCOUNTER — OFFICE VISIT (OUTPATIENT)
Dept: PEDIATRICS | Facility: CLINIC | Age: 62
End: 2022-03-03
Payer: COMMERCIAL

## 2022-03-03 ENCOUNTER — OFFICE VISIT (OUTPATIENT)
Dept: FAMILY MEDICINE | Facility: CLINIC | Age: 62
End: 2022-03-03
Payer: COMMERCIAL

## 2022-03-03 ENCOUNTER — HOSPITAL ENCOUNTER (OUTPATIENT)
Dept: ULTRASOUND IMAGING | Facility: CLINIC | Age: 62
Discharge: HOME | End: 2022-03-03
Attending: PHYSICIAN ASSISTANT | Admitting: PHYSICIAN ASSISTANT
Payer: COMMERCIAL

## 2022-03-03 VITALS
OXYGEN SATURATION: 95 % | HEIGHT: 72 IN | DIASTOLIC BLOOD PRESSURE: 77 MMHG | HEART RATE: 58 BPM | BODY MASS INDEX: 30.84 KG/M2 | WEIGHT: 227.7 LBS | TEMPERATURE: 97.5 F | SYSTOLIC BLOOD PRESSURE: 129 MMHG

## 2022-03-03 VITALS
RESPIRATION RATE: 18 BRPM | WEIGHT: 227 LBS | SYSTOLIC BLOOD PRESSURE: 155 MMHG | OXYGEN SATURATION: 99 % | BODY MASS INDEX: 30.79 KG/M2 | HEART RATE: 53 BPM | DIASTOLIC BLOOD PRESSURE: 89 MMHG | TEMPERATURE: 97.7 F
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
## 2022-03-03 DIAGNOSIS — M79.662 PAIN OF LEFT LOWER LEG: ICD-10-CM
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
## 2022-03-03 DIAGNOSIS — M79.662 PAIN OF LEFT LOWER LEG: Primary | ICD-10-CM

## 2022-03-03 PROCEDURE — 99207 REFERRAL TO ACUTE AND DIAGNOSTIC SERVICES: CPT | Performed by: NURSE PRACTITIONER

## 2022-03-03 PROCEDURE — 73590 X-RAY EXAM OF LOWER LEG: CPT | Mod: LT | Performed by: RADIOLOGY

## 2022-03-03 PROCEDURE — 93971 EXTREMITY STUDY: CPT | Mod: LT

## 2022-03-03 PROCEDURE — 99215 OFFICE O/P EST HI 40 MIN: CPT | Performed by: PHYSICIAN ASSISTANT

## 2022-03-03 NOTE — PATIENT INSTRUCTIONS
Patient Education     Self-Care for Strains and Sprains  Most minor strains and sprains can be treated with self-care. Recovering from a strain or sprain may take 6 to 8 weeks. Your self-care goal is to reduce pain and immobilize the injury to speed healing.  Support the injured area  Wrapping the injured area provides support for short, necessary activities. Be careful not to wrap the area too tightly. This could cut off the blood supply.    Support a wrist, elbow, or shoulder with a sling.    Wrap an ankle or knee with an elastic bandage.    Tape a finger or toe to the one next to it.  Use cold and heat  Cold reduces swelling. Both cold and heat reduce pain. Heat should not be used in the initial treatment of the injury. When using cold or heat, always place a thin towel between the pack and your skin.    Apply ice or a cold pack 10 to 15 minutes every hour you re awake for the first 2 days.    After the swelling goes down, use cold or heat to control pain. Don t use heat late in the day, since it can cause swelling when you re not active.  Rest and elevate  Rest and elevation help your injury heal faster.    Raise the injured area above your heart level.    Keep the injured area from moving.    Limit the use of the joint or limb.  Use medicine    Aspirin reduces pain and swelling. (Note: Don t give aspirin to a child 18 or younger unless prescribed by the doctor.)    Non-steroidal anti-inflammatory medicines, such as ibuprofen, may reduce pain and swelling, as well. Ask your healthcare provider for advice.    When to call your healthcare provider  Call your healthcare provider if:    The injured joint won t move, or bones make a grating sound when they move    You can t put weight on the injured area, even after 24 hours    The injured body part is cold, blue, tingling, or numb    The joint or limb appears bent or crooked.    Pain increases or doesn t improve in 4 days    When pressing along the injured area,  you notice a spot that is especially painful  [employer]dock last reviewed this educational content on 5/1/2018 2000-2021 [employer]. All rights reserved. This information is not intended as a substitute for professional medical care. Always follow your healthcare professional's instructions.           Patient Education     Treating Strains and Sprains    Strains and sprains happen when muscles or other soft tissues near your bones stretch or tear. These injuries can cause bruising, swelling, and pain. To ease your discomfort and speed the healing of your strain or sprain, follow the tips below. Remember, a strain or sprain can take 6 to 8 weeks to heal.  Important Note: Do not give aspirin to children or teens without discussing it with your healthcare provider first.  Ice first, heat later    Use ice for the first 24 to 48 hours after injury. Ice helps prevent swelling and reduce pain. Ice the injury for no more than 20 minutes at a time and allow at least 20 minutes between icing sessions.    Apply heat after the first 72 hours, once the swelling has gone down. Heat relaxes muscles and increases blood flow. Soak the injured area in warm water or use a heating pad set on low for no more than 15 minutes at a time.  Wrap and elevate    Wrap an injured limb firmly with an elastic bandage. This provides support and helps prevent swelling. Don t wear an elastic bandage overnight. Watch for tingling, numbness, or increased pain. Remove the bandage immediately if any of these occurs.    Elevate the injured area to help reduce swelling and throbbing. It s best to raise an injured limb above the level of your heart.  Medicines    Over-the-counter medicines such as acetaminophen or ibuprofen can help reduce pain. Some also help reduce swelling.    Take medicine only as directed.    Rest the area even if medicines are controlling the pain.  Rest    Rest the injured area by not using it for 24 hours.    When  you re ready, return slowly to your normal activities. Rest the injured area often.    Don t use or walk on an injured limb if it hurts.  Leann last reviewed this educational content on 1/1/2018 2000-2021 [employer]. All rights reserved. This information is not intended as a substitute for professional medical care. Always follow your healthcare professional's instructions.

## 2022-03-03 NOTE — Clinical Note
Thank you for your referral to the [facility],  Nam was not found to have a DVT or bony injury.  Referral to [facility] for further exam with MRI to determine muscle vs tendon tears.  Symptomatic treatment for now.  Thank you  JACKIE Macario PA-C

## 2022-03-03 NOTE — PROGRESS NOTES
Assessment & Plan     Pain of left lower leg  Xray of tibia/fibula Negative for acute findings, read by Thuan Quiroz PA-C [facility] at time of visit.  Lower leg ultrasound if negative for DVT  Pain in lower leg is musculoskeletal, ie muscle tear, tendon injury as xray is negative for bony abnormalities and ultrasound neg for DVT  Leg is swollen but at this time there is not significant tightness for compartmental syndrome.  Alternate ice and warm moist heat  ROM exercises  Patient referred to [facility] for further evaluation of leg injury, ie MRI, PT.  He will schedule follow up appt next week  - Referral to Acute and Diagnostic Services (Day of diagnostic / First order acute)  - US Lower Extremity Venous Duplex Left; Future  - XR Tibia & Fibula Left 2 Views; Future  - Orthopedic  Referral; Future    Injury due to skiing accident, initial encounter  Due to skiing injury  Likely muscle tear, however area is swollen  - US Lower Extremity Venous Duplex Left; Future  - XR Tibia & Fibula Left 2 Views; Future  - Orthopedic  Referral; Future    Results for orders placed or performed in visit on 03/03/22   XR Tibia & Fibula Left 2 Views     Status: None    Narrative    EXAM: TIBIA AND FIBULA LEFT TWO VIEWS   DATE/TIME: 3/3/2022 11:32 AM     INDICATION: Left lower leg pain.   COMPARISON: None.      Impression    IMPRESSION:  1.  Normal left tibia and fibula.  2.  No fracture.  3.  Small Achilles calcaneal spur.    STACY GUIDRY MD         SYSTEM ID:  [id number]   Results for orders placed or performed during the hospital encounter of 03/03/22   US Lower Extremity Venous Duplex Left     Status: None    Narrative    VENOUS ULTRASOUND LEFT LEG  3/3/2022 11:02 AM     HISTORY: Pain of left lower leg; Injury due to skiing accident,  initial encounter    COMPARISON: None.    FINDINGS:  Examination of the deep veins with graded compression and  color flow Doppler with spectral wave form analysis was performed.   There is  no evidence for DVT in the left lower extremity.      Impression    IMPRESSION: No evidence of deep venous thrombosis.    CHUY LAWRENCE MD         SYSTEM ID:  [id number]          CONSULTATION/REFERRAL to [facility] for further assessment of leg injury    No follow-ups on file.    JAQUELINE Macario [facility] GIN Browning is a 61 year old who presents for the following health issues     HPI     When did you first notice your pain? - Less than 1 week   Have you seen anyone else for your pain? No  Where in your body do you have pain? Musculoskeletal problem/pain  Onset/Duration: x4 days ago  Description  Location: Left calf  Joint Swelling: YES  Redness: no  Pain: YES- only when he's twisting or walking on it  Warmth: no  Intensity:  moderate  Progression of Symptoms:  improving  Accompanying signs and symptoms:   Fevers: no  Numbness/tingling/weakness: no  History  Trauma to the area: Skiing accident- wiped out, cruising down hill, leg hooked on bamboo pole that was buried. Was in [place] just flew in. Happened on Monday   Recent illness:  no  Previous similar problem: no  Previous evaluation:  no  Precipitating or alleviating factors:  Aggravating factors include: standing, walking and overuse  Therapies tried and outcome: heat and ice, voltaren helped for 3 days 3x a day. Aleve yesterday, last night some advil          Review of Systems   Constitutional, HEENT, cardiovascular, pulmonary, gi and gu systems are negative, except as otherwise noted.      Objective    Resp 18   Wt 103 kg (227 lb)   BMI 30.79 kg/m    Body mass index is 30.79 kg/m .  Physical Exam   GENERAL: healthy, alert and no distress  MS: Positive for left upper calf area tenderness, swelling, no edema present.    SKIN: Skin appears normal with swelling present  VASC: pedal pulses 2+, no erythema or pallor of foot  NEURO: Normal strength and tone, normal sensation

## 2022-03-03 NOTE — PROGRESS NOTES
Assessment & Plan     Pain of left lower leg  Due to injury, swelling, tenderness, and warmth of the calf area, will send to [facility] to get ultrasound and further evaluation.   Rule out provoked thrombus.    - Referral to Acute and Diagnostic Services (Day of diagnostic / First order acute)    Injury due to skiing accident, initial encounter  Discussed nonpharmacologic treatment such as ice, elevation, and OTC NSAIDs to help with pain and inflammation.     Referral to Acute and Diagnostic Services    The [facility] has been contacted at [phone] ([place]) to confirm patient acceptance. The transition to Acute & Diagnostic Services Clinic has been discussed with patient, and he agrees with next level of care.  Patient understands that evaluation/treatment at [facility] typically takes significantly longer than in clinic/urgent care (>2 hours).    Referral placed: Yes  Patient has transportation arranged and will travel to the [facility] without delay: Yes  Patient aware not to eat or drink. Yes    The following provider has assessed this patient for intervention at [facility], and directed the patient for referral: MAURICIO Tucker CNP    Return today (on 3/3/2022) for [facility] evaluation.    FAUSTO Pappas, RN, FNP DNP student       I was present with the student who participated in the service and in the documentation of the note, which I have reviewed and verified. The history, reviews of systems, objective data, and assessment/plan were completed by myself.    MAURICIO Tucker CNP  [facility] PRIOR JAYCE Browning is a 61 year old who presents for the following health issues    Trauma      Pain History:  When did you first notice your pain? - Less than 1 week   Have you seen anyone else for your pain? No  Where in your body do you have pain? Musculoskeletal problem/pain  Onset/Duration: x4 days ago  Description  Location: Left calf  Joint Swelling:  YES  Redness: no  Pain: YES- only when he's twisting or walking on it  Warmth: no  Intensity:  moderate  Progression of Symptoms: improving  Accompanying signs and symptoms:   Fevers: no  Numbness/tingling/weakness: no  History  Trauma to the area: Skiing accident in [place] on Monday 2/28 - wiped out, cruising down hill, leg hooked on bamboo pole that was buried.   Recent illness:  no  Previous similar problem: no  Previous evaluation:  no  Precipitating or alleviating factors:  Aggravating factors include: standing, walking and overuse  Therapies tried and outcome: heat and ice, used Voltaren gel for a few days which helped, has taken Aleve and Advil intermittently     Review of Systems   Constitutional, HEENT, cardiovascular, pulmonary, gi and gu systems are negative, except as otherwise noted.      Objective    /77 (BP Location: Left arm, Patient Position: Sitting)   Pulse 58   Temp 97.5  F (36.4  C) (Tympanic)   Ht 1.829 m (6')   Wt 103.3 kg (227 lb 11.2 oz)   SpO2 95%   BMI 30.88 kg/m    Body mass index is 30.88 kg/m .  Physical Exam  Cardiovascular:      Pulses:           Dorsalis pedis pulses are 2+ on the left side.        Posterior tibial pulses are 2+ on the left side.   Musculoskeletal:      Left knee: Normal.      Left lower leg: Swelling (mild warmth noted) and tenderness (posterior calf) present. No bony tenderness.      Left foot: Decreased range of motion (pain with dorsiflexion).

## 2022-03-07 NOTE — TELEPHONE ENCOUNTER
DIAGNOSIS: pain of L lower leg/Thuan Quiroz   APPOINTMENT DATE: 3.9.22   NOTES STATUS DETAILS   OFFICE NOTE from referring provider Internal 3.3.22 [facility]   MEDICATION LIST Internal    ULTRASOUND Internal 3.3. US   XRAYS (IMAGES & REPORTS) Internal 3.3.22 L tib fib

## 2022-03-09 ENCOUNTER — OFFICE VISIT (OUTPATIENT)
Dept: ORTHOPEDICS | Facility: CLINIC | Age: 62
End: 2022-03-09
Payer: COMMERCIAL

## 2022-03-09 ENCOUNTER — PRE VISIT (OUTPATIENT)
Dept: ORTHOPEDICS | Facility: CLINIC | Age: 62
End: 2022-03-09
Payer: COMMERCIAL

## 2022-03-09 DIAGNOSIS — S86.112A RUPTURE OF LEFT GASTROCNEMIUS TENDON, INITIAL ENCOUNTER: Primary | ICD-10-CM

## 2022-03-09 PROCEDURE — 99203 OFFICE O/P NEW LOW 30 MIN: CPT | Performed by: FAMILY MEDICINE

## 2022-03-09 NOTE — LETTER
3/9/2022    RE: Nam May  [address] [facility] [address]     CHIEF COMPLAINT:  Pain of the Left Lower Leg       HISTORY OF PRESENT ILLNESS  Mr. May is a pleasant 61 year old male who presents to clinic today with pain and swelling of his left lower extremity.  Nam was skiing in [place] 10 days ago when he crashed, feeling and hearing a pop in his left lower leg.  He noticed that his leg was markedly swollen afterwards, he was able to ski the next day but only went on a few runs.  He was seen shortly after returning after his leg became even more swollen.  He had an x-ray and an ultrasound that were fortunately negative.  He is able to walk and feels that his leg has regained some range of motion, although this is still bothersome.  He does feel that he is improving overall.        Additional history: as documented    MEDICAL HISTORY  Patient Active Problem List   Diagnosis     History of colonic polyps     GERD (gastroesophageal reflux disease)     Hyperlipidemia LDL goal <130     Seasonal allergies     Sleep apnea     ED (erectile dysfunction)     Retinal tear of left eye     Right knee pain     Hip pain, right     Tubular adenoma of colon- repeat colonoscopy in  ( 5 years)        Current Outpatient Medications   Medication Sig Dispense Refill     ASPIRIN 81 MG OR TABS 1 TABLET DAILY       MULTIVITAMIN OR None Entered       Omega-3 Krill Oil 500 MG CAPS        simvastatin (ZOCOR) 20 MG tablet TAKE 1 TABLET(20 MG) BY MOUTH AT BEDTIME 90 tablet 3       No Known Allergies    Family History   Problem Relation Age of Onset     Obesity Mother      Hypertension Mother      Snoring Mother      Cancer Father         lymphoma - stage 1E - ? aggressive type -      Alzheimer Disease Father      Cerebrovascular Disease Father          2016     Snoring Father      Osteoarthritis Brother         bilateral knee replacement     Cerebrovascular Disease Maternal Grandfather          at age 79      ORIONASMITHA. Maternal Grandfather      Cancer - colorectal Paternal Grandmother         40's     Colon Cancer Paternal Grandmother         Colon surgery after WWII     C.A.D. Paternal Grandfather          at age 56       Additional medical/Social/Surgical histories reviewed in [facility] and updated as appropriate.        PHYSICAL EXAM  General  - normal appearance, in no obvious distress  HEENT  - conjunctivae not injected, moist mucous membranes  CV  - normal pulse at dorsalis pedis and posterior tibial artery  Pulm  - normal respiratory pattern, non-labored  Musculoskeletal - left lower leg  - inspection: markedly swollen RLE below knee  - palpation: tender medial gastroc  - ROM: reduced ROM of knee  - strength: 5/5 in ankle plantarflexion, dorsiflexion, eversion, inversion  Neuro  - no sensory or motor deficit, grossly normal coordination, normal muscle tone  Skin  - late stage ecchymosis  Psych  - interactive, appropriate, normal mood and affect               ASSESSMENT & PLAN  Mr. May is a 61 year old male who presents to clinic today with left lower leg pain.    Nam likely suffered a partial rupture to his gastroc or soleus.  We discussed pathophysiology and some treatment strategies.    I do think he will do well with mobilization, compression, and conservative care.  I am referring him to physical therapy, we did give him a Ace wrap to wear for compression.  We also discussed prophylactically taking NSAIDs for the next week to prevent myositis ossificans.    He should follow-up in a couple of weeks, if he is not improving we could consider additional imaging to rule out a hematoma or space-occupying lesion, or high-grade muscular rupture that may require surgery.  It was a pleasure seeing Nam today.    Jasmeet Valdez DO, CAM  Primary Care Sports Medicine  This note was constructed using Dragon dictation software, please excuse any minor errors in spelling, grammar, or syntax.

## 2022-03-09 NOTE — PROGRESS NOTES
CHIEF COMPLAINT:  Pain of the Left Lower Leg       HISTORY OF PRESENT ILLNESS  Mr. May is a pleasant 61 year old male who presents to clinic today with pain and swelling of his left lower extremity.  Nam was skiing in [place] 10 days ago when he crashed, feeling and hearing a pop in his left lower leg.  He noticed that his leg was markedly swollen afterwards, he was able to ski the next day but only went on a few runs.  He was seen shortly after returning after his leg became even more swollen.  He had an x-ray and an ultrasound that were fortunately negative.  He is able to walk and feels that his leg has regained some range of motion, although this is still bothersome.  He does feel that he is improving overall.        Additional history: as documented    MEDICAL HISTORY  Patient Active Problem List   Diagnosis     History of colonic polyps     GERD (gastroesophageal reflux disease)     Hyperlipidemia LDL goal <130     Seasonal allergies     Sleep apnea     ED (erectile dysfunction)     Retinal tear of left eye     Right knee pain     Hip pain, right     Tubular adenoma of colon- repeat colonoscopy in  ( 5 years)        Current Outpatient Medications   Medication Sig Dispense Refill     ASPIRIN 81 MG OR TABS 1 TABLET DAILY       MULTIVITAMIN OR None Entered       Omega-3 Krill Oil 500 MG CAPS        simvastatin (ZOCOR) 20 MG tablet TAKE 1 TABLET(20 MG) BY MOUTH AT BEDTIME 90 tablet 3       No Known Allergies    Family History   Problem Relation Age of Onset     Obesity Mother      Hypertension Mother      Snoring Mother      Cancer Father         lymphoma - stage 1E - ? aggressive type -      Alzheimer Disease Father      Cerebrovascular Disease Father          2016     Snoring Father      Osteoarthritis Brother         bilateral knee replacement     Cerebrovascular Disease Maternal Grandfather          at age 79     C.A.D. Maternal Grandfather      Cancer - colorectal Paternal Grandmother          40's     Colon Cancer Paternal Grandmother         Colon surgery after WWII     C.A.D. Paternal Grandfather          at age 56       Additional medical/Social/Surgical histories reviewed in [facility] and updated as appropriate.        PHYSICAL EXAM  General  - normal appearance, in no obvious distress  HEENT  - conjunctivae not injected, moist mucous membranes  CV  - normal pulse at dorsalis pedis and posterior tibial artery  Pulm  - normal respiratory pattern, non-labored  Musculoskeletal - left lower leg  - inspection: markedly swollen RLE below knee  - palpation: tender medial gastroc  - ROM: reduced ROM of knee  - strength: 5/5 in ankle plantarflexion, dorsiflexion, eversion, inversion  Neuro  - no sensory or motor deficit, grossly normal coordination, normal muscle tone  Skin  - late stage ecchymosis  Psych  - interactive, appropriate, normal mood and affect               ASSESSMENT & PLAN  Mr. May is a 61 year old male who presents to clinic today with left lower leg pain.    Nam likely suffered a partial rupture to his gastroc or soleus.  We discussed pathophysiology and some treatment strategies.    I do think he will do well with mobilization, compression, and conservative care.  I am referring him to physical therapy, we did give him a Ace wrap to wear for compression.  We also discussed prophylactically taking NSAIDs for the next week to prevent myositis ossificans.    He should follow-up in a couple of weeks, if he is not improving we could consider additional imaging to rule out a hematoma or space-occupying lesion, or high-grade muscular rupture that may require surgery.  It was a pleasure seeing Nam today.    Jasmeet Valdez DO, [facility]  Primary Care Sports Medicine      This note was constructed using Dragon dictation software, please excuse any minor errors in spelling, grammar, or syntax.

## 2022-03-22 ENCOUNTER — TELEPHONE (OUTPATIENT)
Dept: ORTHOPEDICS | Facility: CLINIC | Age: 62
End: 2022-03-22
Payer: COMMERCIAL

## 2022-03-22 NOTE — TELEPHONE ENCOUNTER
----- Message from Myron Higgins ATC sent at 3/22/2022  3:06 PM CDT -----  Pt is scheduled tomorrow 3/23/22 with Dr. Jasmeet Valdez. Dr. Valdez is not in tomorrow due to [place] hockey. Please help pt reschedule.    Thank you!

## 2022-03-29 ENCOUNTER — OFFICE VISIT (OUTPATIENT)
Dept: ORTHOPEDICS | Facility: CLINIC | Age: 62
End: 2022-03-29
Payer: COMMERCIAL

## 2022-03-29 VITALS — HEIGHT: 72 IN | BODY MASS INDEX: 30.75 KG/M2 | WEIGHT: 227 LBS

## 2022-03-29 DIAGNOSIS — S86.112D RUPTURE OF LEFT GASTROCNEMIUS TENDON, SUBSEQUENT ENCOUNTER: Primary | ICD-10-CM

## 2022-03-29 PROCEDURE — 99212 OFFICE O/P EST SF 10 MIN: CPT | Performed by: FAMILY MEDICINE

## 2022-03-29 NOTE — PROGRESS NOTES
HISTORY OF PRESENT ILLNESS  Mr. May is a pleasant 61 year old male following up with an injury to his left calf muscle.  Nam has been doing well since last seeing me, his pain has been gone as of this past week.  He has been slowly increasing his activity, at times he will feel a twinge and then improves when he backs off.  No new events or concerns.     PHYSICAL EXAM  Vitals:    03/29/22 1433   Weight: 103 kg (227 lb)   Height: 1.829 m (6')     General  - normal appearance, in no obvious distress  HEENT  - conjunctivae not injected, moist mucous membranes  CV  - normal pulse at dorsalis pedis and posterior tibial artery  Pulm  - normal respiratory pattern, non-labored  Musculoskeletal - left lower leg  - stance: normal gait without limp  - inspection: no swelling or effusion  - palpation: no tenderness  - ROM: FROM of knee and ankle, painless  - strength: 5/5 in ankle plantarflexion, dorsiflexion, eversion, inversion  Neuro  - no sensory or motor deficit, grossly normal coordination, normal muscle tone  Skin  - no ecchymosis, erythema, warmth, or induration, no obvious rash    ASSESSMENT & PLAN  Mr. May is a 61 year old male following up with a left gastrocnemius injury.    I am happy that Nam is doing well.  Given his improvement thus far I do think it is appropriate to slowly increase his activity, we discussed how to do this over the course of the next couple of weeks.  He should avoid explosive activity, but can engage in activity that is generally not painful.    If his symptoms continue to improve we can follow-up as needed for this and other issues.    It was a pleasure seeing Nam.        Jasmeet Valdez, , CAQSM      This note was constructed using Dragon dictation software, please excuse any minor errors in spelling, grammar, or syntax.

## 2022-03-29 NOTE — LETTER
3/29/2022      RE: Nam May  [address] [facility] [address]       HISTORY OF PRESENT ILLNESS  Mr. May is a pleasant 61 year old male following up with an injury to his left calf muscle.  Nam has been doing well since last seeing me, his pain has been gone as of this past week.  He has been slowly increasing his activity, at times he will feel a twinge and then improves when he backs off.  No new events or concerns.     PHYSICAL EXAM  Vitals:    03/29/22 1433   Weight: 103 kg (227 lb)   Height: 1.829 m (6')     General  - normal appearance, in no obvious distress  HEENT  - conjunctivae not injected, moist mucous membranes  CV  - normal pulse at dorsalis pedis and posterior tibial artery  Pulm  - normal respiratory pattern, non-labored  Musculoskeletal - left lower leg  - stance: normal gait without limp  - inspection: no swelling or effusion  - palpation: no tenderness  - ROM: FROM of knee and ankle, painless  - strength: 5/5 in ankle plantarflexion, dorsiflexion, eversion, inversion  Neuro  - no sensory or motor deficit, grossly normal coordination, normal muscle tone  Skin  - no ecchymosis, erythema, warmth, or induration, no obvious rash    ASSESSMENT & PLAN  Mr. May is a 61 year old male following up with a left gastrocnemius injury.    I am happy that Nam is doing well.  Given his improvement thus far I do think it is appropriate to slowly increase his activity, we discussed how to do this over the course of the next couple of weeks.  He should avoid explosive activity, but can engage in activity that is generally not painful.    If his symptoms continue to improve we can follow-up as needed for this and other issues.    It was a pleasure seeing Nam.        Jasmeet Valdez DO, CAQSM      This note was constructed using Dragon dictation software, please excuse any minor errors in spelling, grammar, or syntax.        Jasmeet Valdez DO

## 2022-11-19 ENCOUNTER — HEALTH MAINTENANCE LETTER (OUTPATIENT)
Age: 62
End: 2022-11-19

## 2022-12-06 ASSESSMENT — ANXIETY QUESTIONNAIRES
7. FEELING AFRAID AS IF SOMETHING AWFUL MIGHT HAPPEN: NOT AT ALL
2. NOT BEING ABLE TO STOP OR CONTROL WORRYING: NOT AT ALL
5. BEING SO RESTLESS THAT IT IS HARD TO SIT STILL: NOT AT ALL
1. FEELING NERVOUS, ANXIOUS, OR ON EDGE: NOT AT ALL
3. WORRYING TOO MUCH ABOUT DIFFERENT THINGS: NOT AT ALL
6. BECOMING EASILY ANNOYED OR IRRITABLE: NOT AT ALL
IF YOU CHECKED OFF ANY PROBLEMS ON THIS QUESTIONNAIRE, HOW DIFFICULT HAVE THESE PROBLEMS MADE IT FOR YOU TO DO YOUR WORK, TAKE CARE OF THINGS AT HOME, OR GET ALONG WITH OTHER PEOPLE: NOT DIFFICULT AT ALL
GAD7 TOTAL SCORE: 0

## 2022-12-06 ASSESSMENT — ENCOUNTER SYMPTOMS
HEMATURIA: 0
JOINT SWELLING: 0
COUGH: 0
DIARRHEA: 0
MYALGIAS: 0
FEVER: 0
HEADACHES: 0
CHILLS: 0
HEARTBURN: 0
PALPITATIONS: 0
NERVOUS/ANXIOUS: 0
CONSTIPATION: 0
SHORTNESS OF BREATH: 0
ARTHRALGIAS: 0
DIZZINESS: 0
DYSURIA: 0
WEAKNESS: 0
FREQUENCY: 0
NAUSEA: 0
ABDOMINAL PAIN: 0
HEMATOCHEZIA: 0
SORE THROAT: 0
EYE PAIN: 0
PARESTHESIAS: 1

## 2022-12-06 ASSESSMENT — PATIENT HEALTH QUESTIONNAIRE - PHQ9: 5. POOR APPETITE OR OVEREATING: NOT AT ALL

## 2022-12-07 ENCOUNTER — OFFICE VISIT (OUTPATIENT)
Dept: FAMILY MEDICINE | Facility: CLINIC | Age: 62
End: 2022-12-07
Payer: COMMERCIAL

## 2022-12-07 VITALS
RESPIRATION RATE: 16 BRPM | WEIGHT: 224.8 LBS | DIASTOLIC BLOOD PRESSURE: 80 MMHG | OXYGEN SATURATION: 96 % | TEMPERATURE: 97 F | HEART RATE: 66 BPM | SYSTOLIC BLOOD PRESSURE: 130 MMHG | HEIGHT: 71 IN | BODY MASS INDEX: 31.47 KG/M2

## 2022-12-07 DIAGNOSIS — Z00.00 ROUTINE GENERAL MEDICAL EXAMINATION AT A HEALTH CARE FACILITY: Primary | ICD-10-CM

## 2022-12-07 DIAGNOSIS — Z12.5 SCREENING FOR PROSTATE CANCER: ICD-10-CM

## 2022-12-07 DIAGNOSIS — D12.6 TUBULAR ADENOMA OF COLON: ICD-10-CM

## 2022-12-07 DIAGNOSIS — E66.811 CLASS 1 OBESITY WITH SERIOUS COMORBIDITY AND BODY MASS INDEX (BMI) OF 31.0 TO 31.9 IN ADULT, UNSPECIFIED OBESITY TYPE: ICD-10-CM

## 2022-12-07 DIAGNOSIS — Z82.49 FAMILY HISTORY OF ISCHEMIC HEART DISEASE AND OTHER DISEASES OF THE CIRCULATORY SYSTEM: ICD-10-CM

## 2022-12-07 DIAGNOSIS — Z91.09 ENVIRONMENTAL ALLERGIES: ICD-10-CM

## 2022-12-07 DIAGNOSIS — N52.9 ERECTILE DYSFUNCTION, UNSPECIFIED ERECTILE DYSFUNCTION TYPE: ICD-10-CM

## 2022-12-07 DIAGNOSIS — Z51.81 MEDICATION MONITORING ENCOUNTER: ICD-10-CM

## 2022-12-07 DIAGNOSIS — G47.33 OBSTRUCTIVE SLEEP APNEA SYNDROME: ICD-10-CM

## 2022-12-07 DIAGNOSIS — K21.9 GASTROESOPHAGEAL REFLUX DISEASE WITHOUT ESOPHAGITIS: ICD-10-CM

## 2022-12-07 DIAGNOSIS — Z23 NEED FOR INFLUENZA VACCINATION: ICD-10-CM

## 2022-12-07 DIAGNOSIS — Z12.11 SCREEN FOR COLON CANCER: ICD-10-CM

## 2022-12-07 DIAGNOSIS — Z23 NEED FOR COVID-19 VACCINE: ICD-10-CM

## 2022-12-07 DIAGNOSIS — E78.5 HYPERLIPIDEMIA LDL GOAL <130: ICD-10-CM

## 2022-12-07 DIAGNOSIS — Z86.0100 HISTORY OF COLONIC POLYPS: ICD-10-CM

## 2022-12-07 DIAGNOSIS — R09.81 NASAL CONGESTION: ICD-10-CM

## 2022-12-07 LAB
ALBUMIN SERPL BCG-MCNC: 4.5 G/DL (ref 3.5–5.2)
ALBUMIN UR-MCNC: NEGATIVE MG/DL
ALP SERPL-CCNC: 61 U/L (ref 40–129)
ALT SERPL W P-5'-P-CCNC: 30 U/L (ref 10–50)
ANION GAP SERPL CALCULATED.3IONS-SCNC: 12 MMOL/L (ref 7–15)
APPEARANCE UR: CLEAR
AST SERPL W P-5'-P-CCNC: 29 U/L (ref 10–50)
BILIRUB SERPL-MCNC: 0.9 MG/DL
BILIRUB UR QL STRIP: NEGATIVE
BUN SERPL-MCNC: 18.4 MG/DL (ref 8–23)
CALCIUM SERPL-MCNC: 9.4 MG/DL (ref 8.8–10.2)
CHLORIDE SERPL-SCNC: 105 MMOL/L (ref 98–107)
CHOLEST SERPL-MCNC: 160 MG/DL
CK SERPL-CCNC: 148 U/L (ref 39–308)
COLOR UR AUTO: YELLOW
CREAT SERPL-MCNC: 1.03 MG/DL (ref 0.67–1.17)
CREAT UR-MCNC: 136 MG/DL
DEPRECATED HCO3 PLAS-SCNC: 25 MMOL/L (ref 22–29)
ERYTHROCYTE [DISTWIDTH] IN BLOOD BY AUTOMATED COUNT: 12.8 % (ref 10–15)
GFR SERPL CREATININE-BSD FRML MDRD: 82 ML/MIN/1.73M2
GLUCOSE SERPL-MCNC: 83 MG/DL (ref 70–99)
GLUCOSE UR STRIP-MCNC: NEGATIVE MG/DL
HCT VFR BLD AUTO: 44.7 % (ref 40–53)
HDLC SERPL-MCNC: 51 MG/DL
HGB BLD-MCNC: 15.2 G/DL (ref 13.3–17.7)
HGB UR QL STRIP: NEGATIVE
KETONES UR STRIP-MCNC: NEGATIVE MG/DL
LDLC SERPL CALC-MCNC: 95 MG/DL
LEUKOCYTE ESTERASE UR QL STRIP: NEGATIVE
MCH RBC QN AUTO: 30.8 PG (ref 26.5–33)
MCHC RBC AUTO-ENTMCNC: 34 G/DL (ref 31.5–36.5)
MCV RBC AUTO: 91 FL (ref 78–100)
MICROALBUMIN UR-MCNC: <12 MG/L
MICROALBUMIN/CREAT UR: NORMAL MG/G{CREAT}
NITRATE UR QL: NEGATIVE
NONHDLC SERPL-MCNC: 109 MG/DL
PH UR STRIP: 6.5 [PH] (ref 5–7)
PLATELET # BLD AUTO: 266 10E3/UL (ref 150–450)
POTASSIUM SERPL-SCNC: 4.5 MMOL/L (ref 3.4–5.3)
PROT SERPL-MCNC: 6.7 G/DL (ref 6.4–8.3)
PSA SERPL-MCNC: 1.48 NG/ML (ref 0–4.5)
RBC # BLD AUTO: 4.94 10E6/UL (ref 4.4–5.9)
SODIUM SERPL-SCNC: 142 MMOL/L (ref 136–145)
SP GR UR STRIP: 1.02 (ref 1–1.03)
TRIGL SERPL-MCNC: 72 MG/DL
TSH SERPL DL<=0.005 MIU/L-ACNC: 1.6 UIU/ML (ref 0.3–4.2)
UROBILINOGEN UR STRIP-ACNC: 0.2 E.U./DL
WBC # BLD AUTO: 6.1 10E3/UL (ref 4–11)

## 2022-12-07 PROCEDURE — 80061 LIPID PANEL: CPT | Performed by: FAMILY MEDICINE

## 2022-12-07 PROCEDURE — 90471 IMMUNIZATION ADMIN: CPT | Performed by: FAMILY MEDICINE

## 2022-12-07 PROCEDURE — 80053 COMPREHEN METABOLIC PANEL: CPT | Performed by: FAMILY MEDICINE

## 2022-12-07 PROCEDURE — 99214 OFFICE O/P EST MOD 30 MIN: CPT | Mod: 25 | Performed by: FAMILY MEDICINE

## 2022-12-07 PROCEDURE — 90682 RIV4 VACC RECOMBINANT DNA IM: CPT | Performed by: FAMILY MEDICINE

## 2022-12-07 PROCEDURE — G0103 PSA SCREENING: HCPCS | Performed by: FAMILY MEDICINE

## 2022-12-07 PROCEDURE — 36415 COLL VENOUS BLD VENIPUNCTURE: CPT | Performed by: FAMILY MEDICINE

## 2022-12-07 PROCEDURE — 81003 URINALYSIS AUTO W/O SCOPE: CPT | Performed by: FAMILY MEDICINE

## 2022-12-07 PROCEDURE — 82043 UR ALBUMIN QUANTITATIVE: CPT | Performed by: FAMILY MEDICINE

## 2022-12-07 PROCEDURE — 91313 COVID-19 VACCINE BIVALENT BOOSTER 18+ (MODERNA): CPT | Performed by: FAMILY MEDICINE

## 2022-12-07 PROCEDURE — 85027 COMPLETE CBC AUTOMATED: CPT | Performed by: FAMILY MEDICINE

## 2022-12-07 PROCEDURE — 99396 PREV VISIT EST AGE 40-64: CPT | Mod: 25 | Performed by: FAMILY MEDICINE

## 2022-12-07 PROCEDURE — 84443 ASSAY THYROID STIM HORMONE: CPT | Performed by: FAMILY MEDICINE

## 2022-12-07 PROCEDURE — 82550 ASSAY OF CK (CPK): CPT | Performed by: FAMILY MEDICINE

## 2022-12-07 PROCEDURE — 0134A COVID-19 VACCINE BIVALENT BOOSTER 18+ (MODERNA): CPT | Performed by: FAMILY MEDICINE

## 2022-12-07 RX ORDER — SIMVASTATIN 20 MG
20 TABLET ORAL AT BEDTIME
Qty: 90 TABLET | Refills: 3 | Status: SHIPPED | OUTPATIENT
Start: 2022-12-07 | End: 2023-12-05

## 2022-12-07 ASSESSMENT — ENCOUNTER SYMPTOMS
HEARTBURN: 0
SHORTNESS OF BREATH: 0
HEMATURIA: 0
PALPITATIONS: 0
DIARRHEA: 0
HEMATOCHEZIA: 0
ABDOMINAL PAIN: 0
FEVER: 0
FREQUENCY: 0
NAUSEA: 0
WEAKNESS: 0
CHILLS: 0
NERVOUS/ANXIOUS: 0
EYE PAIN: 0
MYALGIAS: 0
SORE THROAT: 0
PARESTHESIAS: 1
ARTHRALGIAS: 0
DYSURIA: 0
CONSTIPATION: 0
COUGH: 0
JOINT SWELLING: 0
DIZZINESS: 0
HEADACHES: 0

## 2022-12-07 ASSESSMENT — ANXIETY QUESTIONNAIRES: GAD7 TOTAL SCORE: 0

## 2022-12-07 NOTE — LETTER
December 12, 2022      Nam May  [address] [facility] [address]        Dear ,    We are writing to inform you of your test results.    Dear Nam,     Your recent results have been reviewed.     They showed:     Labs are overall quite good.     We advise:     Continue current cares.   Balanced low cholesterol diet.   Regular exercise.   Weight loss.     For additional lab test information, labtestsonline.org is an excellent reference.     Let us know if you have any questions or concerns.     Thank you for choosing us for your health care needs!       Resulted Orders   Comprehensive metabolic panel   Result Value Ref Range    Sodium 142 136 - 145 mmol/L    Potassium 4.5 3.4 - 5.3 mmol/L    Chloride 105 98 - 107 mmol/L    Carbon Dioxide (CO2) 25 22 - 29 mmol/L    Anion Gap 12 7 - 15 mmol/L    Urea Nitrogen 18.4 8.0 - 23.0 mg/dL    Creatinine 1.03 0.67 - 1.17 mg/dL    Calcium 9.4 8.8 - 10.2 mg/dL    Glucose 83 70 - 99 mg/dL    Alkaline Phosphatase 61 40 - 129 U/L    AST 29 10 - 50 U/L    ALT 30 10 - 50 U/L    Protein Total 6.7 6.4 - 8.3 g/dL    Albumin 4.5 3.5 - 5.2 g/dL    Bilirubin Total 0.9 <=1.2 mg/dL    GFR Estimate 82 >60 mL/min/1.73m2      Comment:      Effective December 21, 2021 eGFRcr in adults is calculated using the 2021 CKD-EPI creatinine equation which includes age and gender (Khurram harris al., NEJM, DOI: 10.1056/ZHQCks7050057)   Lipid panel reflex to direct LDL Fasting   Result Value Ref Range    Cholesterol 160 <200 mg/dL    Triglycerides 72 <150 mg/dL    Direct Measure HDL 51 >=40 mg/dL    LDL Cholesterol Calculated 95 <=100 mg/dL    Non HDL Cholesterol 109 <130 mg/dL    Narrative    Cholesterol  Desirable:  <200 mg/dL    Triglycerides  Normal:  Less than 150 mg/dL  Borderline High:  150-199 mg/dL  High:  200-499 mg/dL  Very High:  Greater than or equal to 500 mg/dL    Direct Measure HDL  Female:  Greater than or equal to 50 mg/dL   Male:  Greater than or equal to 40 mg/dL    LDL  Cholesterol  Desirable:  <100mg/dL  Above Desirable:  100-129 mg/dL   Borderline High:  130-159 mg/dL   High:  160-189 mg/dL   Very High:  >= 190 mg/dL    Non HDL Cholesterol  Desirable:  130 mg/dL  Above Desirable:  130-159 mg/dL  Borderline High:  160-189 mg/dL  High:  190-219 mg/dL  Very High:  Greater than or equal to 220 mg/dL   CBC with platelets   Result Value Ref Range    WBC Count 6.1 4.0 - 11.0 10e3/uL    RBC Count 4.94 4.40 - 5.90 10e6/uL    Hemoglobin 15.2 13.3 - 17.7 g/dL    Hematocrit 44.7 40.0 - 53.0 %    MCV 91 78 - 100 fL    MCH 30.8 26.5 - 33.0 pg    MCHC 34.0 31.5 - 36.5 g/dL    RDW 12.8 10.0 - 15.0 %    Platelet Count 266 150 - 450 10e3/uL   CK total   Result Value Ref Range     39 - 308 U/L   UA reflex to Microscopic and Culture   Result Value Ref Range    Color Urine Yellow Colorless, Straw, Light Yellow, Yellow    Appearance Urine Clear Clear    Glucose Urine Negative Negative mg/dL    Bilirubin Urine Negative Negative    Ketones Urine Negative Negative mg/dL    Specific Gravity Urine 1.025 1.003 - 1.035    Blood Urine Negative Negative    pH Urine 6.5 5.0 - 7.0    Protein Albumin Urine Negative Negative mg/dL    Urobilinogen Urine 0.2 0.2, 1.0 E.U./dL    Nitrite Urine Negative Negative    Leukocyte Esterase Urine Negative Negative    Narrative    Microscopic not indicated   Albumin Random Urine Quantitative with Creat Ratio   Result Value Ref Range    Albumin Urine mg/L <12.0 mg/L      Comment:      The reference ranges have not been established in urine albumin. The results should be integrated into the clinical context for interpretation.    Albumin Urine mg/g Cr        Comment:      Unable to calculate, urine albumin and/or urine creatinine is outside detectable limits.  Microalbuminuria is defined as an albumin:creatinine ratio of 17 to 299 for males and 25 to 299 for females. A ratio of albumin:creatinine of 300 or higher is indicative of overt proteinuria.  Due to biologic  variability, positive results should be confirmed by a second, first-morning random or 24-hour timed urine specimen. If there is discrepancy, a third specimen is recommended. When 2 out of 3 results are in the microalbuminuria range, this is evidence for incipient nephropathy and warrants increased efforts at glucose control, blood pressure control, and institution of therapy with an angiotensin-converting-enzyme (ACE) inhibitor (if the patient can tolerate it).      Creatinine Urine mg/dL 136.0 mg/dL      Comment:      The reference ranges have not been established in urine creatinine. The results should be integrated into the clinical context for interpretation.   TSH with free T4 reflex   Result Value Ref Range    TSH 1.60 0.30 - 4.20 uIU/mL   Prostate Specific Antigen Screen   Result Value Ref Range    Prostate Specific Antigen Screen 1.48 0.00 - 4.50 ng/mL    Narrative    This result is obtained using the Roche Elecsys total PSA method on the elpidio e801 immunoassay analyzer. Results obtained with different assay methods or kits cannot be used interchangeably.       If you have any questions or concerns, please call the clinic at the number listed above.       Sincerely,      Juan C Palacio MD

## 2022-12-07 NOTE — PROGRESS NOTES
Answers for HPI/ROS submitted by the patient on 12/6/2022  Frequency of exercise:: 4-5 days/week  Getting at least 3 servings of Calcium per day:: Yes  Diet:: Regular (no restrictions)  Taking medications regularly:: Yes  Medication side effects:: Not applicable  Bi-annual eye exam:: Yes  Dental care twice a year:: Yes  Sleep apnea or symptoms of sleep apnea:: Excessive snoring  abdominal pain: No  Blood in stool: No  Blood in urine: No  chest pain: No  chills: No  congestion: Yes  constipation: No  cough: No  diarrhea: No  dizziness: No  ear pain: No  eye pain: No  nervous/anxious: No  fever: No  frequency: No  genital sores: No  headaches: No  hearing loss: No  heartburn: No  arthralgias: No  joint swelling: No  peripheral edema: No  mood changes: No  myalgias: No  nausea: No  dysuria: No  palpitations: No  Skin sensation changes: Yes  sore throat: No  urgency: No  rash: No  shortness of breath: No  visual disturbance: No  weakness: No  impotence: No  penile discharge: No  Additional concerns today:: No  Duration of exercise:: 45-60 minutes

## 2022-12-07 NOTE — PROGRESS NOTES
[facility]  [place]    SUBJECTIVE    CC: Nam is an 62 year old who presents for preventative health visit.          Patient has been advised of split billing requirements and indicates understanding: Yes     Healthy Habits:     Getting at least 3 servings of Calcium per day:  Yes    Bi-annual eye exam:  Yes    Dental care twice a year:  Yes    Sleep apnea or symptoms of sleep apnea:  Excessive snoring    Diet:  Regular (no restrictions)    Frequency of exercise:  4-5 days/week    Duration of exercise:  45-60 minutes    Taking medications regularly:  Yes    Medication side effects:  Not applicable    PHQ-2 Total Score: 0    Additional concerns today:  No    Today's PHQ-2 Score:   PHQ-2 ( 1999 Pfizer) 12/6/2022   Q1: Little interest or pleasure in doing things 0   Q2: Feeling down, depressed or hopeless 0   PHQ-2 Score 0   PHQ-2 Total Score (12-17 Years)- Positive if 3 or more points; Administer PHQ-A if positive -   Q1: Little interest or pleasure in doing things Not at all   Q2: Feeling down, depressed or hopeless Not at all   PHQ-2 Score 0     Have you ever done Advance Care Planning? (For example, a Health Directive, POLST, or a discussion with a medical provider or your loved ones about your wishes): No, advance care planning information given to patient to review.  Patient declined advance care planning discussion at this time.    Social History     Tobacco Use     Smoking status: Former     Packs/day: 0.00     Years: 0.00     Pack years: 0.00     Types: Cigars, Cigarettes     Smokeless tobacco: Never     Tobacco comments:     Occasional cigar   Substance Use Topics     Alcohol use: Yes     Alcohol/week: 6.0 - 7.0 standard drinks     Types: 6 - 7 Standard drinks or equivalent per week     Comment: avg 0-12 beers per week     If you drink alcohol do you typically have >3 drinks per day or >7 drinks per week? No    AUDIT - Alcohol Use Disorders Identification Test - Reproduced from the World Health  Organization Audit 2001 (Second Edition) 12/6/2022   1.  How often do you have a drink containing alcohol? 2 to 3 times a week   2.  How many drinks containing alcohol do you have on a typical day when you are drinking? 3 or 4   3.  How often do you have five or more drinks on one occasion? Less than monthly   4.  How often during the last year have you found that you were not able to stop drinking once you had started? Never   5.  How often during the last year have you failed to do what was normally expected of you because of drinking? Never   6.  How often during the last year have you needed a first drink in the morning to get yourself going after a heavy drinking session? Never   7.  How often during the last year have you had a feeling of guilt or remorse after drinking? Never   8.  How often during the last year have you been unable to remember what happened the night before because of your drinking? Never   9.  Have you or someone else been injured because of your drinking? No   10. Has a relative, friend, doctor or other health care worker been concerned about your drinking or suggested you cut down? No   TOTAL SCORE 5     Last PSA:   PSA   Date Value Ref Range Status   11/19/2020 1.43 0 - 4 ug/L Final     Comment:     Assay Method:  Chemiluminescence using Siemens Vista analyzer     Prostate Specific Antigen Screen   Date Value Ref Range Status   11/08/2021 2.10 0.00 - 4.00 ug/L Final     Reviewed orders with patient. Reviewed health maintenance and updated orders accordingly - Yes    Reviewed and updated as needed this visit by clinical staff   Tobacco  Allergies  Meds  Problems  Med Hx  Surg Hx  Fam Hx        Reviewed and updated as needed this visit by Provider                 Review of Systems   Constitutional: Negative for chills and fever.   HENT: Positive for congestion. Negative for ear pain, hearing loss and sore throat.    Eyes: Negative for pain and visual disturbance.   Respiratory:  Negative for cough and shortness of breath.    Cardiovascular: Negative for chest pain, palpitations and peripheral edema.   Gastrointestinal: Negative for abdominal pain, constipation, diarrhea, heartburn, hematochezia and nausea.   Genitourinary: Negative for dysuria, frequency, genital sores, hematuria, impotence, penile discharge and urgency.   Musculoskeletal: Negative for arthralgias, joint swelling and myalgias.   Skin: Negative for rash.   Neurological: Positive for paresthesias. Negative for dizziness, weakness and headaches.   Psychiatric/Behavioral: Negative for mood changes. The patient is not nervous/anxious.      Left calf injury - skiing, followed by ortho    BRAXTON - CPAP - being reevaluated by sleep, nasal congestion overnight    Lipids    Recent Labs   Lab Test 11/08/21  0939 11/19/20  0743 08/31/16  0821 06/05/15  0828   CHOL 187 205*   < > 174   HDL 49 51   < > 50   * 130*   < > 101   TRIG 166* 119   < > 116   CHOLHDLRATIO  --   --   --  3.5    < > = values in this interval not displayed.     GERD - no issues    Environmental Allergies - overnight nasal congestion - this year September to November    ED    Colon polyps    Health Maintenance     Colonoscopy:  Due 8/2023   FIT:  given              PSA:  pending   DEXA:  NA    Health Maintenance Due   Topic Date Due     ANNUAL REVIEW OF HM ORDERS  11/08/2022     PSA  11/08/2022     LUNG CANCER SCREENING  12/23/2022       Current Problem List    Patient Active Problem List   Diagnosis     History of colonic polyps     GERD (gastroesophageal reflux disease)     Hyperlipidemia LDL goal <130     Seasonal allergies     Sleep apnea     ED (erectile dysfunction)     Retinal tear of left eye     Right knee pain     Hip pain, right     Tubular adenoma of colon- repeat colonoscopy in 2023 ( 5 years)      Class 1 obesity with serious comorbidity and body mass index (BMI) of 31.0 to 31.9 in adult, unspecified obesity type     Environmental allergies      Family history of ischemic heart disease and other diseases of the circulatory system       Past Medical History    Past Medical History:   Diagnosis Date     Colon polyp 08/2018    6 mm polyp     ED (erectile dysfunction) 2013    mild - Dr Cotto     Family history of malignant neoplasm of gastrointestinal tract     PGM - colon ca     GERD (gastroesophageal reflux disease)      Hyperlipidemia LDL goal <130 2008     Personal history of colonic polyps 12/99, 12/02, 3/05, 12/08    adenoma, tubular adenoma      Retinal tear of left eye 2/15    VRS     Seasonal allergies summer/fall    Allergic rhinitis H/O AIT - ragweed     Sleep apnea 4/11    severe - CPAP       Past Surgical History    Past Surgical History:   Procedure Laterality Date     ARTHROSCOPY KNEE WITH MENISCECTOMY Right 07/2010    Meniscus tear surgery     COLONOSCOPY  12/99, 12/02, 3/05, 12/08, 3/13, 8/18    tubular adenoma - 6 mm polyp, due 5 yrs - 8/2023     EYE SURGERY Left 02/2015    VRS - left eye surgery     EYE SURGERY  02/2014    Retinal tear     HC REMOVE TONSILS/ADENOIDS,<13 Y/O  1964    T & A <12y.o.     ORTHOPEDIC SURGERY Right 01/2003    Right toe fusion, miniscus artho - right knee     STRESS ECHO (METRO)  10/2008    normal     SURGICAL HISTORY OF -  Right 09/2010    Rt Knee Arthroscopy - Dr Curtis     TOE SURGERY Right 01/2003    rt great toe fusion       Current Medications    Current Outpatient Medications   Medication Sig Dispense Refill     ASPIRIN 81 MG OR TABS 1 TABLET DAILY       MULTIVITAMIN OR None Entered       Omega-3 Krill Oil 500 MG CAPS        simvastatin (ZOCOR) 20 MG tablet Take 1 tablet (20 mg) by mouth At Bedtime 90 tablet 3       Allergies    Allergies   Allergen Reactions     Seasonal Allergies        Immunizations    Immunization History   Administered Date(s) Administered     COVID-19 Vaccine 18+ (Moderna) 03/24/2021, 04/21/2021     COVID-19 Vaccine Bivalent Booster 18+ (Moderna) 12/07/2022     COVID-19,PF,Moderna  Booster 2021     Flu, Unspecified 2019     Influenza (IIV3) PF 10/25/2013, 10/05/2020     Influenza Vaccine 50-64 or 18-64 w/egg allergy (Flublok) 2021, 2022     Influenza Vaccine >6 months (Alfuria,Fluzone) 12/15/2017, 10/20/2020     Influenza Vaccine, 6+MO IM (QUADRIVALENT W/PRESERVATIVES) 10/02/2019     MMR 08/15/1990     Poliovirus, inactivated (IPV) 2011     TD (ADULT, 7+) 1997, 2003     TDAP Vaccine (Boostrix) 2011     Tdap (Adacel,Boostrix) 2021     Twinrix A/B 2011, 2011, 2011     Typhoid Oral 2011     Zoster vaccine recombinant adjuvanted (SHINGRIX) 2019, 2019       Family History    Family History   Problem Relation Age of Onset     Obesity Mother      Hypertension Mother      Snoring Mother      Cancer Father         lymphoma - stage 1E - ? aggressive type -      Alzheimer Disease Father      Cerebrovascular Disease Father          2016     Snoring Father      Osteoarthritis Brother         bilateral knee replacement     Cerebrovascular Disease Maternal Grandfather          at age 79     C.A.D. Maternal Grandfather      Cancer - colorectal Paternal Grandmother         40's     Colon Cancer Paternal Grandmother         Colon surgery after WWII     C.A.D. Paternal Grandfather          at age 56     Other - See Comments Daughter         adopted     Other - See Comments Daughter         adopted       Social History    Social History     Socioeconomic History     Marital status:      Spouse name: Arabella     Number of children: 2     Years of education: 18     Highest education level: Not on file   Occupational History     Employer: [employer]   Tobacco Use     Smoking status: Former     Packs/day: 0.00     Years: 0.00     Pack years: 0.00     Types: Cigars, Cigarettes     Smokeless tobacco: Never     Tobacco comments:     Occasional cigar   Vaping Use     Vaping Use: Never used   Substance and  Sexual Activity     Alcohol use: Yes     Alcohol/week: 6.0 - 7.0 standard drinks     Types: 6 - 7 Standard drinks or equivalent per week     Comment: avg 0-12 beers per week     Drug use: No     Sexual activity: Yes     Partners: Female   Other Topics Concern      Service Not Asked     Blood Transfusions Not Asked     Caffeine Concern Yes     Comment: 2 cups qd     Occupational Exposure Not Asked     Hobby Hazards Not Asked     Sleep Concern Not Asked     Stress Concern Not Asked     Weight Concern Not Asked     Special Diet Not Asked     Back Care Not Asked     Exercise Yes     Comment: 5-6 days per week     Bike Helmet Not Asked     Seat Belt Yes     Self-Exams Not Asked     Parent/sibling w/ CABG, MI or angioplasty before 65F 55M? No   Social History Narrative     Not on file     Social Determinants of Health     Financial Resource Strain: Low Risk      Difficulty of Paying Living Expenses: Not hard at all   Food Insecurity: No Food Insecurity     Worried About Running Out of Food in the Last Year: Never true     Ran Out of Food in the Last Year: Never true   Transportation Needs: No Transportation Needs     Lack of Transportation (Medical): No     Lack of Transportation (Non-Medical): No   Physical Activity: Not on file   Stress: Not on file   Social Connections: Not on file   Intimate Partner Violence: Not on file   Housing Stability: Not on file       ROS    CONSTITUTIONAL: NEGATIVE for fever, chills, change in weight  INTEGUMENTARY/SKIN: NEGATIVE for worrisome rashes, moles or lesions  EYES: NEGATIVE for vision changes or irritation  ENT/MOUTH: NEGATIVE for ear, mouth and throat problems  RESP: NEGATIVE for significant cough or SOB  CV: NEGATIVE for chest pain, palpitations or peripheral edema  GI: NEGATIVE for nausea, abdominal pain, heartburn, or change in bowel habits  : NEGATIVE for frequency, dysuria, or hematuria  MUSCULOSKELETAL: NEGATIVE for significant arthralgias or myalgia  NEURO:  "NEGATIVE for weakness, dizziness or paresthesias  ENDOCRINE: NEGATIVE for temperature intolerance, skin/hair changes  HEME: NEGATIVE for bleeding problems  PSYCHIATRIC: NEGATIVE for changes in mood or affect    OBJECTIVE    /80   Pulse 66   Temp 97  F (36.1  C) (Tympanic)   Resp 16   Ht 1.81 m (5' 11.25\")   Wt 102 kg (224 lb 12.8 oz)   SpO2 96%   BMI 31.13 kg/m      EXAM:    GENERAL: healthy, alert and no distress  EYES: Eyes grossly normal to inspection, PERRL and conjunctivae and sclerae normal  HENT: ear canals and TM's normal, nose and mouth without ulcers or lesions  NECK: no adenopathy, no asymmetry, masses, or scars and thyroid normal to palpation  RESP: lungs clear to auscultation - no rales, rhonchi or wheezes  CV: regular rate and rhythm, normal S1 S2, no S3 or S4, no murmur, click or rub, no peripheral edema and peripheral pulses strong  ABDOMEN: soft, nontender, no hepatosplenomegaly, no masses and bowel sounds normal   (male): testicles normal without atrophy or masses, no hernias and penis normal without urethral discharge  RECTAL: normal sphincter tone, no rectal masses and prostate of normal size for age, smooth, nontender without masses/nodules  MS: no gross musculoskeletal defects noted, no edema  SKIN: no suspicious lesions or rashes  NEURO: Normal strength and tone, mentation intact and speech normal  PSYCH: mentation appears normal, affect normal/bright  LYMPH: no cervical, supraclavicular, axillary, or inguinal adenopathy    DIAGNOSTICS/PROCEDURES    Pending    ASSESSMENT      ICD-10-CM    1. Routine general medical examination at a health care facility  Z00.00 Comprehensive metabolic panel     Lipid panel reflex to direct LDL Fasting     CBC with platelets     CK total     UA reflex to Microscopic and Culture     Albumin Random Urine Quantitative with Creat Ratio     TSH with free T4 reflex     Prostate Specific Antigen Screen     Fecal colorectal cancer screen FIT     " Comprehensive metabolic panel     Lipid panel reflex to direct LDL Fasting     CBC with platelets     CK total     UA reflex to Microscopic and Culture     Albumin Random Urine Quantitative with Creat Ratio     TSH with free T4 reflex     Prostate Specific Antigen Screen     Fecal colorectal cancer screen FIT      2. Obstructive sleep apnea syndrome  G47.33 TSH with free T4 reflex     TSH with free T4 reflex     OFFICE/OUTPT VISIT,EST,LEVL IV      3. Hyperlipidemia LDL goal <130  E78.5 Comprehensive metabolic panel     Lipid panel reflex to direct LDL Fasting     CK total     Comprehensive metabolic panel     Lipid panel reflex to direct LDL Fasting     CK total     simvastatin (ZOCOR) 20 MG tablet     OFFICE/OUTPT VISIT,EST,LEVL IV      4. Gastroesophageal reflux disease without esophagitis  K21.9 CBC with platelets     CBC with platelets     OFFICE/OUTPT VISIT,EST,LEVL IV      5. Environmental allergies  Z91.09 OFFICE/OUTPT VISIT,EST,LEVL IV      6. Nasal congestion  R09.81 Adult ENT  Referral     OFFICE/OUTPT VISIT,EST,LEVL IV      7. Family history of ischemic heart disease and other diseases of the circulatory system  Z82.49 Lipid panel reflex to direct LDL Fasting     Lipid panel reflex to direct LDL Fasting     OFFICE/OUTPT VISIT,EST,LEVL IV      8. Erectile dysfunction, unspecified erectile dysfunction type  N52.9 OFFICE/OUTPT VISIT,EST,LEVL IV      9. Class 1 obesity with serious comorbidity and body mass index (BMI) of 31.0 to 31.9 in adult, unspecified obesity type  E66.9     Z68.31       10. History of colonic polyps  Z86.010 Fecal colorectal cancer screen FIT     Fecal colorectal cancer screen FIT     OFFICE/OUTPT VISIT,EST,LEVL IV      11. Tubular adenoma of colon- repeat colonoscopy in 2023 ( 5 years)   D12.6 Fecal colorectal cancer screen FIT     Fecal colorectal cancer screen FIT     OFFICE/OUTPT VISIT,EST,LEVL IV      12. Screen for colon cancer  Z12.11 Fecal colorectal cancer screen FIT      Fecal colorectal cancer screen FIT     OFFICE/OUTPT VISIT,EST,LEVL IV      13. Screening for prostate cancer  Z12.5 Prostate Specific Antigen Screen     Prostate Specific Antigen Screen     OFFICE/OUTPT VISIT,EST,LEVL IV      14. Medication monitoring encounter  Z51.81 Comprehensive metabolic panel     Lipid panel reflex to direct LDL Fasting     CBC with platelets     CK total     UA reflex to Microscopic and Culture     Albumin Random Urine Quantitative with Creat Ratio     TSH with free T4 reflex     Comprehensive metabolic panel     Lipid panel reflex to direct LDL Fasting     CBC with platelets     CK total     UA reflex to Microscopic and Culture     Albumin Random Urine Quantitative with Creat Ratio     TSH with free T4 reflex     OFFICE/OUTPT VISIT,EST,LEVL IV      15. Need for COVID-19 vaccine  Z23 COVID-19 VACCINE BIVALENT BOOSTER 18+ (MODERNA)     OFFICE/OUTPT VISIT,EST,LEVL IV      16. Need for influenza vaccination  Z23 INFLUENZA VACCINE 50-64 OR 18-64 W/EGG ALLERGY (FLUBLOK)     OFFICE/OUTPT VISIT,EST,LEVL IV          PLAN    Discussed treatment/modality options, including risk and benefits, he desires:    advised alcohol consumption 1oz per day or less, advised 1 multivitamin per day, advised calcium 3670-8596 mg/d and Vitamin D 800-1200 IU/d, advised dentist every 6 months, advised diet, exercise, and weight loss, advised opthalmologist every 1-2 years, advised self testicular exam q month, further health care maintenance, further lab(s), immunization(s), medication refill(s) and observation    Discussed controversies surrounding PSA. Specifically reviewed 2017 USPSTF findings recommending discussion of PSA testing for men ages 55-69.  Reviewed findings of the  Randomized Study of Screening for Prostate Cancer which showed a 30% reduction in advanced stage prostate cancer and a 20% reduction in death rate from prostate cancer in this age group. PSA-based screening may prevent up to 2  "deaths and up to 3 cases of metastatic disease per 1,000 men screened over 13 years.    We've elected to do PSA this year after discussing these controversies.    All diagnosis above reviewed and noted above, otherwise stable.      See [facility] orders for further details.      1) med refills    2) labs pending    3) immunizations - flu/bivalent COVID    4) ENT    5) Sleep    Return in about 1 year (around 12/7/2023) for Complete Physical, Medication Recheck Visit, Follow Up Chronic.    Health Maintenance Due   Topic Date Due     ANNUAL REVIEW OF  ORDERS  11/08/2022     PSA  11/08/2022     LUNG CANCER SCREENING  12/23/2022       COUNSELING    Reviewed preventive health counseling, as reflected in patient instructions    BP Readings from Last 1 Encounters:   12/07/22 130/80     Estimated body mass index is 31.13 kg/m  as calculated from the following:    Height as of this encounter: 1.81 m (5' 11.25\").    Weight as of this encounter: 102 kg (224 lb 12.8 oz).      Weight management plan: diet and exercise     reports that he has quit smoking. His smoking use included cigars. He has never used smokeless tobacco.      Counseling Resources:    ATP IV Guidelines  Pooled Cohorts Equation Calculator  FRAX Risk Assessment  ICSI Preventive Guidelines  Dietary Guidelines for Americans, 2010  USDA's MyPlate  ASA Prophylaxis  Lung CA Screening           Juan C Palacio MD, FAAFP     [facility]  [address] [place] [id number]  [email or web address]   Office: [phone]  Fax: [phone]  Pager: [phone]     "

## 2023-02-14 ENCOUNTER — TRANSFERRED RECORDS (OUTPATIENT)
Dept: HEALTH INFORMATION MANAGEMENT | Facility: CLINIC | Age: 63
End: 2023-02-14
Payer: COMMERCIAL

## 2023-07-14 ENCOUNTER — LAB (OUTPATIENT)
Dept: LAB | Facility: CLINIC | Age: 63
End: 2023-07-14
Payer: COMMERCIAL

## 2023-07-14 DIAGNOSIS — C18.9 COLON CANCER (H): Primary | ICD-10-CM

## 2023-07-14 LAB — HEMOCCULT STL QL IA: NEGATIVE

## 2023-07-14 PROCEDURE — 82274 ASSAY TEST FOR BLOOD FECAL: CPT | Performed by: FAMILY MEDICINE

## 2023-07-16 ENCOUNTER — MYC MEDICAL ADVICE (OUTPATIENT)
Dept: FAMILY MEDICINE | Facility: CLINIC | Age: 63
End: 2023-07-16
Payer: COMMERCIAL

## 2023-07-16 DIAGNOSIS — Z12.11 SCREEN FOR COLON CANCER: Primary | ICD-10-CM

## 2023-07-20 NOTE — TELEPHONE ENCOUNTER
Please review with Nam    He is due for colonoscopy 8/2023    His FIT kit was given in 12/2022, completed 7/2023    Please have him check with insurance they may only cover one screening test for colon cancer, meaning, because FIT was completed in 2023, they may not cover colonoscopy until 2024    I'm ok with colonoscopy as soon as insurance will cover

## 2023-07-27 NOTE — TELEPHONE ENCOUNTER
Please review with patient, since insurance is ok with covering colonoscopy, I'm ok with proceeding

## 2023-08-09 ENCOUNTER — TELEPHONE (OUTPATIENT)
Dept: GASTROENTEROLOGY | Facility: CLINIC | Age: 63
End: 2023-08-09
Payer: COMMERCIAL

## 2023-08-09 NOTE — TELEPHONE ENCOUNTER
"Endoscopy Scheduling Screen    Have you had a positive Covid test in the last 14 days?  No    Are you active on MyChart?   Yes    What insurance is in the chart?  BC/BS: Schedule in [facility] unless patient meets exclusion criteria.     Ordering/Referring Provider: Yassine Gordillo MD     (If ordering provider performs procedure, schedule with ordering provider unless otherwise instructed. )    BMI: Estimated body mass index is 31.13 kg/m  as calculated from the following:    Height as of 12/7/22: 1.81 m (5' 11.25\").    Weight as of 12/7/22: 102 kg (224 lb 12.8 oz).     Sedation Ordered  moderate sedation.   If patient BMI > 50 do not schedule in [facility].    Are you taking any prescription medications for pain?   No    Are you taking methadone or Suboxone?  No    Do you have a history of malignant hyperthermia or adverse reaction to anesthesia?  No    (Females) Are you currently pregnant?   No     Have you been diagnosed or told you have pulmonary hypertension?   No    Do you have an LVAD?  No    Have you been told you have moderate to severe sleep apnea?  No    Have you been told you have COPD, asthma, or any other lung disease?  No    Do you have any heart conditions?  No     Have you ever had or are you awaiting a heart or lung transplant?   No    Have you had a stroke or transient ischemic attack (TIA aka \"mini stroke\" in the last 6 months?   No    Have you been diagnosed with or been told you have cirrhosis of the liver?   No    Are you currently on dialysis?   No    Do you need assistance transferring?   No    BMI: Estimated body mass index is 31.13 kg/m  as calculated from the following:    Height as of 12/7/22: 1.81 m (5' 11.25\").    Weight as of 12/7/22: 102 kg (224 lb 12.8 oz).     Is patients BMI > 40 and scheduling location [facility]?  No    Do you take the medication Phentermine, Ozempic or Wegovy?  No    Do you take the medication Naltrexone?  No    Do you take blood thinners?  No      Prep   Are you currently on " dialysis or do you have chronic kidney disease?  No    Do you have a diagnosis of diabetes?  No    Do you have a diagnosis of cystic fibrosis (CF)?  No    On a regular basis do you go 3 -5 days between bowel movements?  No    BMI > 40?  No    Preferred Pharmacy:      [id number] [place], [place] - [address]  [address] [facility] [address]  Phone: [phone] Fax: [phone]      Final Scheduling Details   Colonoscopy prep sent?  MiraLAX (No Mag Citrate)    Procedure scheduled  Colonoscopy    Surgeon:  JONATHAN     Date of procedure:  11/8     Schedule PAC:   No    Location  [facility]    Sedation   Moderate Sedation    Patient Reminders:   You will receive a call from a Nurse to review instructions and health history.  This assessment must be completed prior to your procedure.  Failure to complete the Nurse assessment may result in the procedure being cancelled.      On the day of your procedure, please designate an adult(s) who can drive you home stay with you for the next 24 hours. The medicines used in the exam will make you sleepy. You will not be able to drive.      You cannot take public transportation, ride share services, or non-medical taxi service without a responsible caregiver.  Medical transport services are allowed with the requirement that a responsible caregiver will receive you at your destination.  We require that drivers and caregivers are confirmed prior to your procedure.

## 2023-09-13 ENCOUNTER — NURSE TRIAGE (OUTPATIENT)
Dept: FAMILY MEDICINE | Facility: CLINIC | Age: 63
End: 2023-09-13
Payer: COMMERCIAL

## 2023-09-13 NOTE — TELEPHONE ENCOUNTER
"Pt calling stating that he thinks he has pink eye/conjunctivitis     Reviewed home cares with pt     Patient stated an understanding and agreed with plan.    Reason for Disposition   Red eye is part of a cold, and no eye pain or blurred vision    Additional Information   Negative: Chemical got in the eye   Negative: Piece of something got in the eye   Negative: Followed an eye injury   Negative: Yellow or green pus in the eyes   Negative: Eyelid is swollen and no redness of white of eye (sclera)   Negative: SEVERE eye pain   Negative: Recent eye surgery and has increasing eye pain   Negative: Patient sounds very sick or weak to the triager   Negative: MODERATE eye pain or discomfort (e.g., interferes with normal activities or awakens from sleep; more than mild)   Negative: Looking at light causes MODERATE to SEVERE eye pain (i.e., photophobia)   Negative: New or worsening blurred vision   Negative: Cloudy spot or sore seen on the cornea (clear part of the eye)   Negative: Eyelids are very swollen (shut or almost)   Negative: Eyelid (outer) is very red   Negative: Vomiting   Negative: Foreign body sensation ('feels like something is in there')   Negative: Patient wants to be seen   Negative: Eye pain present > 24 hours   Negative: Bleeding on white of the eye and is taking Coumadin or known bleeding disorder (e.g., thrombocytopenia)   Negative: Only 1 eye is red, and persists > 48 hours   Negative: Red eyes present > 7 days   Negative: Bleeding on white of the eye    Answer Assessment - Initial Assessment Questions  1. LOCATION: Location: \"What's red, the eyeball or the outer eyelids?\" (Note: when callers say the eye is red, they usually mean the sclera is red)        Both eyes but they are moderately red  2. REDNESS OF SCLERA: \"Is the redness in one or both eyes?\" \"When did the redness start?\"       Modertaly red     3. ONSET: \"When did the eye become red?\" (e.g., hours, days)       2 days   4. EYELIDS: \"Are the " "eyelids red or swollen?\" If Yes, ask: \"How much?\"       None   5. VISION: \"Is there any difficulty seeing clearly?\"       none  6. ITCHING: \"Does it feel itchy?\" If so ask: \"How bad is it\" (e.g., Scale 1-10; or mild, moderate, severe)      None   7. PAIN: \"Is there any pain? If Yes, ask: \"How bad is it?\" (e.g., Scale 1-10; or mild, moderate, severe)      None   8. CONTACT LENS: \"Do you wear contacts?\"      Yes   9. CAUSE: \"What do you think is causing the redness?\"      Allergies     10. OTHER SYMPTOMS: \"Do you have any other symptoms?\" (e.g., fever, runny nose, cough, vomiting)        Denies: fevers, chills, sweats, cough, runny nose, nausea, vomiting    Protocols used: Eye - Red Without Pus-A-OH    "

## 2023-09-14 ENCOUNTER — OFFICE VISIT (OUTPATIENT)
Dept: FAMILY MEDICINE | Facility: CLINIC | Age: 63
End: 2023-09-14
Payer: COMMERCIAL

## 2023-09-14 VITALS
SYSTOLIC BLOOD PRESSURE: 180 MMHG | HEIGHT: 72 IN | TEMPERATURE: 98 F | RESPIRATION RATE: 14 BRPM | WEIGHT: 226 LBS | BODY MASS INDEX: 30.61 KG/M2 | OXYGEN SATURATION: 97 % | DIASTOLIC BLOOD PRESSURE: 98 MMHG | HEART RATE: 52 BPM

## 2023-09-14 DIAGNOSIS — Z23 NEED FOR PROPHYLACTIC VACCINATION AND INOCULATION AGAINST INFLUENZA: ICD-10-CM

## 2023-09-14 DIAGNOSIS — R03.0 ELEVATED BLOOD PRESSURE READING WITHOUT DIAGNOSIS OF HYPERTENSION: ICD-10-CM

## 2023-09-14 DIAGNOSIS — H10.13 ALLERGIC CONJUNCTIVITIS, BILATERAL: ICD-10-CM

## 2023-09-14 DIAGNOSIS — H10.33 ACUTE CONJUNCTIVITIS OF BOTH EYES, UNSPECIFIED ACUTE CONJUNCTIVITIS TYPE: Primary | ICD-10-CM

## 2023-09-14 PROCEDURE — 99214 OFFICE O/P EST MOD 30 MIN: CPT | Mod: 25 | Performed by: NURSE PRACTITIONER

## 2023-09-14 PROCEDURE — 90471 IMMUNIZATION ADMIN: CPT | Performed by: NURSE PRACTITIONER

## 2023-09-14 PROCEDURE — 90682 RIV4 VACC RECOMBINANT DNA IM: CPT | Performed by: NURSE PRACTITIONER

## 2023-09-14 RX ORDER — OLOPATADINE HYDROCHLORIDE 1 MG/ML
1 SOLUTION/ DROPS OPHTHALMIC 2 TIMES DAILY
Qty: 5 ML | Refills: 4 | Status: SHIPPED | OUTPATIENT
Start: 2023-09-14 | End: 2023-12-07

## 2023-09-14 RX ORDER — POLYMYXIN B SULFATE AND TRIMETHOPRIM 1; 10000 MG/ML; [USP'U]/ML
1-2 SOLUTION OPHTHALMIC 4 TIMES DAILY
Qty: 10 ML | Refills: 0 | Status: SHIPPED | OUTPATIENT
Start: 2023-09-14 | End: 2023-12-07

## 2023-09-14 NOTE — PROGRESS NOTES
"  Assessment & Plan     Elevated blood pressure reading without diagnosis of hypertension  Monitor for now, reduce caffeine intake.     Acute conjunctivitis of both eyes, unspecified acute conjunctivitis type  Will treat with eye drops, if not improving with need full eye exam  - trimethoprim-polymyxin b (POLYTRIM) 24288-3.1 UNIT/ML-% ophthalmic solution  Dispense: 10 mL; Refill: 0    Allergic conjunctivitis, bilateral  - will also cover for allergic conjunctivitis   - olopatadine (PATANOL) 0.1 % ophthalmic solution  Dispense: 5 mL; Refill: 4    Need for prophylactic vaccination and inoculation against influenza  - flu shot given       BMI:   Estimated body mass index is 30.87 kg/m  as calculated from the following:    Height as of this encounter: 1.822 m (5' 11.75\").    Weight as of this encounter: 102.5 kg (226 lb).   Weight management plan: Discussed healthy diet and exercise guidelines      Isidra Strickland NP  [facility] CORDELIA Browning is a 63 year old, presenting for the following health issues:  Eye Problem        9/14/2023     9:15 AM   Additional Questions   Roomed by Juan C Meadows   Accompanied by self       History of Present Illness       Reason for visit:  Conjuntivitas  Symptom onset:  1-3 days ago    He eats 2-3 servings of fruits and vegetables daily.He consumes 0 sweetened beverage(s) daily.He exercises with enough effort to increase his heart rate 30 to 60 minutes per day.  He exercises with enough effort to increase his heart rate 6 days per week.   He is taking medications regularly.     Elevated BP today, not symptomatic, did just consumer high doses of caffeine, no chest pain, sob, headaches or vision changes    He has allergies, and both his eyes are red and non painful, but drainage is there and his eye are crusted. Denies vision changes.         Review of Systems   Constitutional, HEENT, cardiovascular, pulmonary, gi and gu systems are negative, except as " "otherwise noted.      Objective    BP (!) 180/98 (BP Location: Right arm, Patient Position: Right side)   Pulse 52   Temp 98  F (36.7  C) (Oral)   Resp 14   Ht 1.822 m (5' 11.75\")   Wt 102.5 kg (226 lb)   SpO2 97%   BMI 30.87 kg/m    Body mass index is 30.87 kg/m .  Physical Exam   GENERAL: healthy, alert and no distress  EYES: Eyes grossly normal to inspection and conjunctiva/corneas- conjunctival injection OU  NECK: no adenopathy, no asymmetry, masses, or scars and thyroid normal to palpation  RESP: lungs clear to auscultation - no rales, rhonchi or wheezes  CV: regular rate and rhythm, normal S1 S2, no S3 or S4, no murmur, click or rub, no peripheral edema and peripheral pulses strong  ABDOMEN: soft, nontender, no hepatosplenomegaly, no masses and bowel sounds normal  MS: no gross musculoskeletal defects noted, no edema                      "

## 2023-11-08 ENCOUNTER — PATIENT OUTREACH (OUTPATIENT)
Dept: GASTROENTEROLOGY | Facility: CLINIC | Age: 63
End: 2023-11-08
Payer: COMMERCIAL

## 2023-11-08 ENCOUNTER — HOSPITAL ENCOUNTER (OUTPATIENT)
Facility: CLINIC | Age: 63
Discharge: HOME | End: 2023-11-08
Attending: INTERNAL MEDICINE | Admitting: INTERNAL MEDICINE
Payer: COMMERCIAL

## 2023-11-08 VITALS
DIASTOLIC BLOOD PRESSURE: 77 MMHG | HEIGHT: 72 IN | TEMPERATURE: 97.9 F | RESPIRATION RATE: 20 BRPM | WEIGHT: 219 LBS | BODY MASS INDEX: 29.66 KG/M2 | OXYGEN SATURATION: 96 % | HEART RATE: 62 BPM | SYSTOLIC BLOOD PRESSURE: 123 MMHG

## 2023-11-08 LAB — COLONOSCOPY: NORMAL

## 2023-11-08 PROCEDURE — G0121 COLON CA SCRN NOT HI RSK IND: HCPCS | Performed by: INTERNAL MEDICINE

## 2023-11-08 PROCEDURE — G0500 MOD SEDAT ENDO SERVICE >5YRS: HCPCS | Performed by: INTERNAL MEDICINE

## 2023-11-08 PROCEDURE — 250N000011 HC RX IP 250 OP 636: Mod: JZ | Performed by: INTERNAL MEDICINE

## 2023-11-08 PROCEDURE — 45378 DIAGNOSTIC COLONOSCOPY: CPT | Performed by: INTERNAL MEDICINE

## 2023-11-08 RX ORDER — ATROPINE SULFATE 0.1 MG/ML
1 INJECTION INTRAVENOUS
Status: DISCONTINUED | OUTPATIENT
Start: 2023-11-08 | End: 2023-11-08 | Stop reason: HOSPADM

## 2023-11-08 RX ORDER — LIDOCAINE 40 MG/G
CREAM TOPICAL
Status: DISCONTINUED | OUTPATIENT
Start: 2023-11-08 | End: 2023-11-08 | Stop reason: HOSPADM

## 2023-11-08 RX ORDER — FENTANYL CITRATE 50 UG/ML
50-100 INJECTION, SOLUTION INTRAMUSCULAR; INTRAVENOUS EVERY 5 MIN PRN
Status: DISCONTINUED | OUTPATIENT
Start: 2023-11-08 | End: 2023-11-08 | Stop reason: HOSPADM

## 2023-11-08 RX ORDER — ONDANSETRON 2 MG/ML
4 INJECTION INTRAMUSCULAR; INTRAVENOUS
Status: DISCONTINUED | OUTPATIENT
Start: 2023-11-08 | End: 2023-11-08 | Stop reason: HOSPADM

## 2023-11-08 RX ORDER — ONDANSETRON 2 MG/ML
4 INJECTION INTRAMUSCULAR; INTRAVENOUS EVERY 6 HOURS PRN
Status: DISCONTINUED | OUTPATIENT
Start: 2023-11-08 | End: 2023-11-08 | Stop reason: HOSPADM

## 2023-11-08 RX ORDER — EPINEPHRINE 1 MG/ML
0.1 INJECTION, SOLUTION INTRAMUSCULAR; SUBCUTANEOUS
Status: DISCONTINUED | OUTPATIENT
Start: 2023-11-08 | End: 2023-11-08 | Stop reason: HOSPADM

## 2023-11-08 RX ORDER — NALOXONE HYDROCHLORIDE 0.4 MG/ML
0.2 INJECTION, SOLUTION INTRAMUSCULAR; INTRAVENOUS; SUBCUTANEOUS
Status: DISCONTINUED | OUTPATIENT
Start: 2023-11-08 | End: 2023-11-08 | Stop reason: HOSPADM

## 2023-11-08 RX ORDER — DIPHENHYDRAMINE HYDROCHLORIDE 50 MG/ML
25-50 INJECTION INTRAMUSCULAR; INTRAVENOUS
Status: DISCONTINUED | OUTPATIENT
Start: 2023-11-08 | End: 2023-11-08 | Stop reason: HOSPADM

## 2023-11-08 RX ORDER — ONDANSETRON 4 MG/1
4 TABLET, ORALLY DISINTEGRATING ORAL EVERY 6 HOURS PRN
Status: DISCONTINUED | OUTPATIENT
Start: 2023-11-08 | End: 2023-11-08 | Stop reason: HOSPADM

## 2023-11-08 RX ORDER — FLUMAZENIL 0.1 MG/ML
0.2 INJECTION, SOLUTION INTRAVENOUS
Status: DISCONTINUED | OUTPATIENT
Start: 2023-11-08 | End: 2023-11-08 | Stop reason: HOSPADM

## 2023-11-08 RX ORDER — SIMETHICONE 40MG/0.6ML
133 SUSPENSION, DROPS(FINAL DOSAGE FORM)(ML) ORAL
Status: DISCONTINUED | OUTPATIENT
Start: 2023-11-08 | End: 2023-11-08 | Stop reason: HOSPADM

## 2023-11-08 RX ORDER — NALOXONE HYDROCHLORIDE 0.4 MG/ML
0.4 INJECTION, SOLUTION INTRAMUSCULAR; INTRAVENOUS; SUBCUTANEOUS
Status: DISCONTINUED | OUTPATIENT
Start: 2023-11-08 | End: 2023-11-08 | Stop reason: HOSPADM

## 2023-11-08 RX ORDER — PROCHLORPERAZINE MALEATE 10 MG
10 TABLET ORAL EVERY 6 HOURS PRN
Status: DISCONTINUED | OUTPATIENT
Start: 2023-11-08 | End: 2023-11-08 | Stop reason: HOSPADM

## 2023-11-08 RX ADMIN — MIDAZOLAM HYDROCHLORIDE 2 MG: 1 INJECTION, SOLUTION INTRAMUSCULAR; INTRAVENOUS at 08:00

## 2023-11-08 RX ADMIN — FENTANYL CITRATE 100 MCG: 50 INJECTION, SOLUTION INTRAMUSCULAR; INTRAVENOUS at 08:00

## 2023-11-08 ASSESSMENT — ACTIVITIES OF DAILY LIVING (ADL)
ADLS_ACUITY_SCORE: 35
ADLS_ACUITY_SCORE: 35

## 2023-11-08 NOTE — H&P
Pre-Endoscopy History and Physical     Nam May MRN# [id number]   YOB: 1960 Age: 63 year old     Date of Procedure: 11/8/2023  Primary care provider: Juan C Palacio  Type of Endoscopy: Colonoscopy with possible biopsy, possible polypectomy  Reason for Procedure: polyp  Type of Anesthesia Anticipated: Conscious Sedation    HPI:    Nam is a 63 year old male who will be undergoing the above procedure.      A history and physical has been performed. The patient's medications and allergies have been reviewed. The risks and benefits of the procedure and the sedation options and risks were discussed with the patient.  All questions were answered and informed consent was obtained.      He denies a personal or family history of anesthesia complications or bleeding disorders.     Patient Active Problem List   Diagnosis    History of colonic polyps    GERD (gastroesophageal reflux disease)    Hyperlipidemia LDL goal <130    Seasonal allergies    Sleep apnea    ED (erectile dysfunction)    Retinal tear of left eye    Right knee pain    Hip pain, right    Tubular adenoma of colon- repeat colonoscopy in 2023 ( 5 years)     Class 1 obesity with serious comorbidity and body mass index (BMI) of 31.0 to 31.9 in adult, unspecified obesity type    Environmental allergies    Family history of ischemic heart disease and other diseases of the circulatory system        Past Medical History:   Diagnosis Date    Colon polyp 08/2018    6 mm polyp    ED (erectile dysfunction) 2013    mild - Dr Cotto    Family history of malignant neoplasm of gastrointestinal tract     PGM - colon ca    GERD (gastroesophageal reflux disease)     Hyperlipidemia LDL goal <130 2008    Personal history of colonic polyps 12/99, 12/02, 3/05, 12/08    adenoma, tubular adenoma     Retinal tear of left eye 2/15    VRS    Seasonal allergies summer/fall    Allergic rhinitis H/O AIT - ragweed    Sleep apnea 4/11    severe - CPAP        Past Surgical  History:   Procedure Laterality Date    ARTHROSCOPY KNEE WITH MENISCECTOMY Right 2010    Meniscus tear surgery    COLONOSCOPY  , , 3/05, , 3/13,     tubular adenoma - 6 mm polyp, due 5 yrs - 2023    EYE SURGERY Left 2015    VRS - left eye surgery    EYE SURGERY  2014    Retinal tear    HC REMOVE TONSILS/ADENOIDS,<11 Y/O  1964    T & A <12y.o.    ORTHOPEDIC SURGERY Right 2003    Right toe fusion, miniscus artho - right knee    STRESS ECHO (METRO)  10/2008    normal    SURGICAL HISTORY OF -  Right 2010    Rt Knee Arthroscopy - Dr Curtis    TOE SURGERY Right 2003    rt great toe fusion       Social History     Tobacco Use    Smoking status: Former     Packs/day: 0.00     Years: 0.00     Additional pack years: 0.00     Total pack years: 0.00     Types: Cigars, Cigarettes    Smokeless tobacco: Never    Tobacco comments:     Occasional cigar   Substance Use Topics    Alcohol use: Yes     Alcohol/week: 6.0 - 7.0 standard drinks of alcohol     Types: 6 - 7 Standard drinks or equivalent per week     Comment: avg 0-12 beers per week       Family History   Problem Relation Age of Onset    Obesity Mother     Hypertension Mother     Snoring Mother     Cancer Father         lymphoma - stage 1E - ? aggressive type -     Alzheimer Disease Father     Cerebrovascular Disease Father          2016    Snoring Father     Osteoarthritis Brother         bilateral knee replacement    Cerebrovascular Disease Maternal Grandfather          at age 79    C.A.D. Maternal Grandfather     Cancer - colorectal Paternal Grandmother         40's    Colon Cancer Paternal Grandmother         Colon surgery after WWII    C.A.D. Paternal Grandfather          at age 56    Other - See Comments Daughter         adopted    Other - See Comments Daughter         adopted       Prior to Admission medications    Medication Sig Start Date End Date Taking? Authorizing Provider   ASPIRIN 81 MG OR TABS 1  "TABLET DAILY   Yes Reported, Patient   MULTIVITAMIN OR None Entered   Yes Reported, Patient   olopatadine (PATANOL) 0.1 % ophthalmic solution Place 1 drop into both eyes 2 times daily 9/14/23  Yes Isidra Strickland NP   Omega-3 Krill Oil 500 MG CAPS  12/15/17  Yes Juan C Palacio MD   simvastatin (ZOCOR) 20 MG tablet Take 1 tablet (20 mg) by mouth At Bedtime 12/7/22  Yes Juan C Palacio MD   trimethoprim-polymyxin b (POLYTRIM) 55978-9.1 UNIT/ML-% ophthalmic solution Place 1-2 drops into both eyes 4 times daily 9/14/23  Yes Isidra Strickland NP       Allergies   Allergen Reactions    Seasonal Allergies         REVIEW OF SYSTEMS:   5 point ROS negative except as noted above in HPI, including Gen., Resp., CV, GI &  system review.    PHYSICAL EXAM:   BP (!) 150/93   Pulse 59   Temp 97.9  F (36.6  C) (Temporal)   Resp 16   Ht 1.822 m (5' 11.75\")   Wt 99.3 kg (219 lb)   SpO2 95%   BMI 29.91 kg/m   Estimated body mass index is 29.91 kg/m  as calculated from the following:    Height as of this encounter: 1.822 m (5' 11.75\").    Weight as of this encounter: 99.3 kg (219 lb).   GENERAL APPEARANCE: alert, and oriented  MENTAL STATUS: alert  AIRWAY EXAM: Mallampatti Class I (visualization of the soft palate, fauces, uvula, anterior and posterior pillars)  RESP: lungs clear to auscultation - no rales, rhonchi or wheezes  CV: regular rates and rhythm  DIAGNOSTICS:    Not indicated    IMPRESSION   ASA Class 2 - Mild systemic disease    PLAN:   Plan for Colonoscopy with possible biopsy, possible polypectomy. We discussed the risks, benefits and alternatives and the patient wished to proceed.    The above has been forwarded to the consulting provider.      Signed Electronically by: Yassine Gordillo MD  November 8, 2023          "

## 2023-12-05 DIAGNOSIS — E78.5 HYPERLIPIDEMIA LDL GOAL <130: ICD-10-CM

## 2023-12-05 RX ORDER — SIMVASTATIN 20 MG
20 TABLET ORAL AT BEDTIME
Qty: 30 TABLET | Refills: 0 | Status: SHIPPED | OUTPATIENT
Start: 2023-12-05 | End: 2023-12-07

## 2023-12-05 ASSESSMENT — ENCOUNTER SYMPTOMS
WEAKNESS: 0
JOINT SWELLING: 0
COUGH: 0
SORE THROAT: 0
CONSTIPATION: 0
PALPITATIONS: 0
HEARTBURN: 0
ARTHRALGIAS: 0
CHILLS: 0
FEVER: 0
PARESTHESIAS: 0
FREQUENCY: 0
ABDOMINAL PAIN: 0
DIARRHEA: 0
DYSURIA: 0
HEADACHES: 0
SHORTNESS OF BREATH: 0
EYE PAIN: 0
NERVOUS/ANXIOUS: 0
MYALGIAS: 0
DIZZINESS: 0
NAUSEA: 0
HEMATOCHEZIA: 0
HEMATURIA: 0

## 2023-12-05 NOTE — TELEPHONE ENCOUNTER
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 Recommended Dose: CPM: 5 mg Sat; 10 mg ROW (65 mg/wk)   Follow-Up: 1/23/2019  Comments: verbal and written dosing instructions given to pt    Counseling  Stressed importance of compliance with exact recommended dosage regimen  Stressed importance of compliance with consistent vitamin K intake  Instructed to notify clinic about medication changes or health status changes  Instructed to notify clinic about scheduled procedures  Discussed risk of thrombosis and/or bleeding with inappropriate anticoagulant use    Patient verbalized understanding and agreement with plan. Pt requested pneumococcal vaccine, Pneumovax 23 was given 12/26/2017, as per protocol Prevnar 13 0.5 ml administered in left deltoid, pt tolerated injection well.  Possible side effects discussed with pt, pt verbalized understanding, agreed with plan of care Prescription approved per [facility] Refill Protocol.  Sandra Garduno, RN  [facility] Triage Nurse

## 2023-12-07 ENCOUNTER — OFFICE VISIT (OUTPATIENT)
Dept: FAMILY MEDICINE | Facility: CLINIC | Age: 63
End: 2023-12-07
Payer: COMMERCIAL

## 2023-12-07 VITALS
TEMPERATURE: 96.2 F | HEIGHT: 72 IN | DIASTOLIC BLOOD PRESSURE: 78 MMHG | RESPIRATION RATE: 16 BRPM | OXYGEN SATURATION: 97 % | BODY MASS INDEX: 30.2 KG/M2 | WEIGHT: 223 LBS | HEART RATE: 66 BPM | SYSTOLIC BLOOD PRESSURE: 136 MMHG

## 2023-12-07 DIAGNOSIS — Z86.0100 HISTORY OF COLONIC POLYPS: ICD-10-CM

## 2023-12-07 DIAGNOSIS — K21.9 GASTROESOPHAGEAL REFLUX DISEASE WITHOUT ESOPHAGITIS: ICD-10-CM

## 2023-12-07 DIAGNOSIS — Z29.11 NEED FOR RSV IMMUNIZATION: ICD-10-CM

## 2023-12-07 DIAGNOSIS — E78.5 HYPERLIPIDEMIA LDL GOAL <130: ICD-10-CM

## 2023-12-07 DIAGNOSIS — Z91.09 ENVIRONMENTAL ALLERGIES: ICD-10-CM

## 2023-12-07 DIAGNOSIS — Z23 NEED FOR COVID-19 VACCINE: ICD-10-CM

## 2023-12-07 DIAGNOSIS — G47.33 OBSTRUCTIVE SLEEP APNEA SYNDROME: ICD-10-CM

## 2023-12-07 DIAGNOSIS — D12.6 TUBULAR ADENOMA OF COLON: ICD-10-CM

## 2023-12-07 DIAGNOSIS — Z82.49 FAMILY HISTORY OF ISCHEMIC HEART DISEASE AND OTHER DISEASES OF THE CIRCULATORY SYSTEM: ICD-10-CM

## 2023-12-07 DIAGNOSIS — Z12.5 SCREENING FOR PROSTATE CANCER: ICD-10-CM

## 2023-12-07 DIAGNOSIS — Z51.81 MEDICATION MONITORING ENCOUNTER: ICD-10-CM

## 2023-12-07 DIAGNOSIS — Z12.11 SCREEN FOR COLON CANCER: ICD-10-CM

## 2023-12-07 DIAGNOSIS — E66.811 CLASS 1 OBESITY WITH SERIOUS COMORBIDITY AND BODY MASS INDEX (BMI) OF 30.0 TO 30.9 IN ADULT, UNSPECIFIED OBESITY TYPE: ICD-10-CM

## 2023-12-07 DIAGNOSIS — Z00.00 ROUTINE GENERAL MEDICAL EXAMINATION AT A HEALTH CARE FACILITY: Primary | ICD-10-CM

## 2023-12-07 DIAGNOSIS — N52.9 ERECTILE DYSFUNCTION, UNSPECIFIED ERECTILE DYSFUNCTION TYPE: ICD-10-CM

## 2023-12-07 LAB
ALBUMIN SERPL BCG-MCNC: 4.6 G/DL (ref 3.5–5.2)
ALBUMIN UR-MCNC: NEGATIVE MG/DL
ALP SERPL-CCNC: 69 U/L (ref 40–150)
ALT SERPL W P-5'-P-CCNC: 34 U/L (ref 0–70)
ANION GAP SERPL CALCULATED.3IONS-SCNC: 11 MMOL/L (ref 7–15)
APPEARANCE UR: CLEAR
AST SERPL W P-5'-P-CCNC: 29 U/L (ref 0–45)
BILIRUB SERPL-MCNC: 1.2 MG/DL
BILIRUB UR QL STRIP: NEGATIVE
BUN SERPL-MCNC: 16.7 MG/DL (ref 8–23)
CALCIUM SERPL-MCNC: 9.4 MG/DL (ref 8.8–10.2)
CHLORIDE SERPL-SCNC: 106 MMOL/L (ref 98–107)
CHOLEST SERPL-MCNC: 174 MG/DL
CK SERPL-CCNC: 117 U/L (ref 39–308)
COLOR UR AUTO: YELLOW
CREAT SERPL-MCNC: 0.92 MG/DL (ref 0.67–1.17)
CREAT UR-MCNC: 208 MG/DL
DEPRECATED HCO3 PLAS-SCNC: 23 MMOL/L (ref 22–29)
EGFRCR SERPLBLD CKD-EPI 2021: >90 ML/MIN/1.73M2
ERYTHROCYTE [DISTWIDTH] IN BLOOD BY AUTOMATED COUNT: 12.6 % (ref 10–15)
FASTING STATUS PATIENT QL REPORTED: YES
GLUCOSE SERPL-MCNC: 92 MG/DL (ref 70–99)
GLUCOSE UR STRIP-MCNC: NEGATIVE MG/DL
HCT VFR BLD AUTO: 43.8 % (ref 40–53)
HDLC SERPL-MCNC: 50 MG/DL
HGB BLD-MCNC: 14.9 G/DL (ref 13.3–17.7)
HGB UR QL STRIP: ABNORMAL
KETONES UR STRIP-MCNC: NEGATIVE MG/DL
LDLC SERPL CALC-MCNC: 104 MG/DL
LEUKOCYTE ESTERASE UR QL STRIP: NEGATIVE
MCH RBC QN AUTO: 31 PG (ref 26.5–33)
MCHC RBC AUTO-ENTMCNC: 34 G/DL (ref 31.5–36.5)
MCV RBC AUTO: 91 FL (ref 78–100)
MICROALBUMIN UR-MCNC: <12 MG/L
MICROALBUMIN/CREAT UR: NORMAL MG/G{CREAT}
MUCOUS THREADS #/AREA URNS LPF: PRESENT /LPF
NITRATE UR QL: NEGATIVE
NONHDLC SERPL-MCNC: 124 MG/DL
PH UR STRIP: 6.5 [PH] (ref 5–7)
PLATELET # BLD AUTO: 255 10E3/UL (ref 150–450)
POTASSIUM SERPL-SCNC: 4.2 MMOL/L (ref 3.4–5.3)
PROT SERPL-MCNC: 6.9 G/DL (ref 6.4–8.3)
PSA SERPL DL<=0.01 NG/ML-MCNC: 1.86 NG/ML (ref 0–4.5)
RBC # BLD AUTO: 4.8 10E6/UL (ref 4.4–5.9)
RBC #/AREA URNS AUTO: ABNORMAL /HPF
SODIUM SERPL-SCNC: 140 MMOL/L (ref 135–145)
SP GR UR STRIP: 1.02 (ref 1–1.03)
TRIGL SERPL-MCNC: 100 MG/DL
TSH SERPL DL<=0.005 MIU/L-ACNC: 2.58 UIU/ML (ref 0.3–4.2)
UROBILINOGEN UR STRIP-ACNC: 0.2 E.U./DL
WBC # BLD AUTO: 6.2 10E3/UL (ref 4–11)
WBC #/AREA URNS AUTO: ABNORMAL /HPF

## 2023-12-07 PROCEDURE — 36415 COLL VENOUS BLD VENIPUNCTURE: CPT | Performed by: FAMILY MEDICINE

## 2023-12-07 PROCEDURE — 99214 OFFICE O/P EST MOD 30 MIN: CPT | Mod: 25 | Performed by: FAMILY MEDICINE

## 2023-12-07 PROCEDURE — 80061 LIPID PANEL: CPT | Performed by: FAMILY MEDICINE

## 2023-12-07 PROCEDURE — 90471 IMMUNIZATION ADMIN: CPT | Performed by: FAMILY MEDICINE

## 2023-12-07 PROCEDURE — 80053 COMPREHEN METABOLIC PANEL: CPT | Performed by: FAMILY MEDICINE

## 2023-12-07 PROCEDURE — 85027 COMPLETE CBC AUTOMATED: CPT | Performed by: FAMILY MEDICINE

## 2023-12-07 PROCEDURE — 90678 RSV VACC PREF BIVALENT IM: CPT | Performed by: FAMILY MEDICINE

## 2023-12-07 PROCEDURE — 91320 SARSCV2 VAC 30MCG TRS-SUC IM: CPT | Performed by: FAMILY MEDICINE

## 2023-12-07 PROCEDURE — 84443 ASSAY THYROID STIM HORMONE: CPT | Performed by: FAMILY MEDICINE

## 2023-12-07 PROCEDURE — 82570 ASSAY OF URINE CREATININE: CPT | Performed by: FAMILY MEDICINE

## 2023-12-07 PROCEDURE — 90480 ADMN SARSCOV2 VAC 1/ONLY CMP: CPT | Performed by: FAMILY MEDICINE

## 2023-12-07 PROCEDURE — 82550 ASSAY OF CK (CPK): CPT | Performed by: FAMILY MEDICINE

## 2023-12-07 PROCEDURE — 82043 UR ALBUMIN QUANTITATIVE: CPT | Performed by: FAMILY MEDICINE

## 2023-12-07 PROCEDURE — 81001 URINALYSIS AUTO W/SCOPE: CPT | Performed by: FAMILY MEDICINE

## 2023-12-07 PROCEDURE — G0103 PSA SCREENING: HCPCS | Performed by: FAMILY MEDICINE

## 2023-12-07 PROCEDURE — 99396 PREV VISIT EST AGE 40-64: CPT | Mod: 25 | Performed by: FAMILY MEDICINE

## 2023-12-07 RX ORDER — SIMVASTATIN 20 MG
20 TABLET ORAL AT BEDTIME
Qty: 90 TABLET | Refills: 3 | Status: SHIPPED | OUTPATIENT
Start: 2023-12-07

## 2023-12-07 NOTE — PROGRESS NOTES
"[facility] Nik Browning is a 63 year old, presenting for the following:    Physical        12/7/2023     7:11 AM   Additional Questions   Roomed by Rosalinda       Healthy Habits:     Getting at least 3 servings of Calcium per day:  Yes    Bi-annual eye exam:  Yes    Dental care twice a year:  Yes    Sleep apnea or symptoms of sleep apnea:  None    Diet:  Regular (no restrictions)    Frequency of exercise:  4-5 days/week    Duration of exercise:  30-45 minutes    Taking medications regularly:  Yes    Medication side effects:  None    Additional concerns today:  No    Hyperlipidemia Follow-Up    Are you regularly taking any medication or supplement to lower your cholesterol?   Simvastatin  Are you having muscle aches or other side effects that you think could be caused by your cholesterol lowering medication?  No    Recent Labs   Lab Test 12/07/22  1126 11/08/21  0939   CHOL 160 187   HDL 51 49   LDL 95 105*   TRIG 72 166*     BRAXTON - unable to get into clinic / new machine  - no current treatment - seen by ENT - deviated septum    GERD - no sx    FH CAD - no cp - no sob - no edema    Environmental Allergies - \"ok\" - fall worse    ED - no issues    Hx of colon polyps - FH as well    Retinal tear - follows with [facility]    Social History     Tobacco Use    Smoking status: Former     Types: Cigars    Smokeless tobacco: Never    Tobacco comments:     Occasional cigar - maybe 12/year   Substance Use Topics    Alcohol use: Yes     Alcohol/week: 7.0 - 8.0 standard drinks of alcohol     Types: 7 - 8 Standard drinks or equivalent per week             12/5/2023     9:38 AM   Alcohol Use   Prescreen: >3 drinks/day or >7 drinks/week? No         12/6/2022     9:04 PM   AUDIT - Alcohol Use Disorders Identification Test - Reproduced from the World Health Organization Audit 2001 (Second Edition)   1.  How often do you have a drink containing alcohol? 2 to 3 times a week   2.  How many drinks containing " alcohol do you have on a typical day when you are drinking? 3 or 4   3.  How often do you have five or more drinks on one occasion? Less than monthly   4.  How often during the last year have you found that you were not able to stop drinking once you had started? Never   5.  How often during the last year have you failed to do what was normally expected of you because of drinking? Never   6.  How often during the last year have you needed a first drink in the morning to get yourself going after a heavy drinking session? Never   7.  How often during the last year have you had a feeling of guilt or remorse after drinking? Never   8.  How often during the last year have you been unable to remember what happened the night before because of your drinking? Never   9.  Have you or someone else been injured because of your drinking? No   10. Has a relative, friend, doctor or other health care worker been concerned about your drinking or suggested you cut down? No   TOTAL SCORE 5       Last PSA:   PSA   Date Value Ref Range Status   11/19/2020 1.43 0 - 4 ug/L Final     Comment:     Assay Method:  Chemiluminescence using Siemens Vista analyzer     Prostate Specific Antigen Screen   Date Value Ref Range Status   12/07/2022 1.48 0.00 - 4.50 ng/mL Final   11/08/2021 2.10 0.00 - 4.00 ug/L Final       Reviewed orders with patient. Reviewed health maintenance and updated orders accordingly - Yes    Reviewed and updated as needed this visit by clinical staff                  Reviewed and updated as needed this visit by Provider                 Health Maintenance     Colonoscopy:  due 11/2028   FIT:  NA              PSA:  pending   DEXA:  NA    Health Maintenance Due   Topic Date Due    IPV IMMUNIZATION (2 of 3 - Adult catch-up series) 02/18/2011    PSA  12/07/2023       Current Problem List    Patient Active Problem List   Diagnosis    History of colonic polyps    GERD (gastroesophageal reflux disease)    Hyperlipidemia LDL goal  <130    Seasonal allergies    Sleep apnea    ED (erectile dysfunction)    Retinal tear of left eye    Right knee pain    Hip pain, right    Tubular adenoma of colon- repeat colonoscopy in 2023 ( 5 years)     Class 1 obesity with serious comorbidity and body mass index (BMI) of 31.0 to 31.9 in adult, unspecified obesity type    Environmental allergies    Family history of ischemic heart disease and other diseases of the circulatory system       Past Medical History    Past Medical History:   Diagnosis Date    Colon polyp 08/2018    6 mm polyp    ED (erectile dysfunction) 2013    mild - Dr Cotto    Family history of malignant neoplasm of gastrointestinal tract     PGM - colon ca    GERD (gastroesophageal reflux disease)     Hyperlipidemia LDL goal <130 2008    Personal history of colonic polyps 12/99, 12/02, 3/05, 12/08    adenoma, tubular adenoma     Retinal tear of left eye 2/15    VRS    Seasonal allergies summer/fall    Allergic rhinitis H/O AIT - ragweed    Sleep apnea 4/11    severe - CPAP       Past Surgical History    Past Surgical History:   Procedure Laterality Date    BIOPSY  4/2021    Moles (negative)    COLONOSCOPY  12/99, 12/02, 3/05, 12/08, 3/13, 8/18    tubular adenoma - 6 mm polyp, due 5 yrs - 8/2023    COLONOSCOPY N/A 11/08/2023    negative - due 5 yrs    EYE SURGERY Left 02/2015    VRS - left eye surgery    HC REMOVE TONSILS/ADENOIDS,<11 Y/O  1964    T & A <12y.o.    ORTHOPEDIC SURGERY Right 01/2003    Right toe fusion, miniscus artho - right knee    STRESS ECHO (METRO)  10/2008    normal    SURGICAL HISTORY OF -  Right 09/2010    Rt Knee Arthroscopy - Dr Curtis    SURGICAL HISTORY OF -   03/2021    Left Calf repair       Current Medications    Current Outpatient Medications   Medication Sig Dispense Refill    simvastatin (ZOCOR) 20 MG tablet Take 1 tablet (20 mg) by mouth at bedtime 90 tablet 3    ASPIRIN 81 MG OR TABS 1 TABLET DAILY      MULTIVITAMIN OR None Entered      Omega-3 Krill Oil  500 MG CAPS          Allergies    Allergies   Allergen Reactions    Seasonal Allergies        Immunizations    Immunization History   Administered Date(s) Administered    COVID-19 12+ (-) (Pfizer) 2023    COVID-19 Bivalent 18+ (Moderna) 2022    COVID-19 Monovalent 18+ (Moderna) 2021, 2021    COVID-19 Monovalent Booster 18+ (Moderna) 2021    Flu, Unspecified 2019    Influenza (IIV3) PF 10/25/2013, 10/05/2020    Influenza Vaccine 18-64 (Flublok) 2021, 2022, 2023    Influenza Vaccine >6 months,quad, PF 12/15/2017, 10/20/2020    Influenza Vaccine, 6+MO IM (QUADRIVALENT W/PRESERVATIVES) 10/02/2019    MMR 08/15/1990    Poliovirus, inactivated (IPV) 2011    RSV Vaccine (Abrysvo) 2023    TD,PF 7+ (Tenivac) 1997, 2003    TDAP (Adacel,Boostrix) 2021    TDAP Vaccine (Boostrix) 2011    Twinrix A/B 2011, 2011, 2011    Typhoid Oral 2011    Zoster recombinant adjuvanted (SHINGRIX) 2019, 2019       Family History    Family History   Problem Relation Age of Onset    Obesity Mother     Hypertension Mother     Snoring Mother     Cancer Father         lymphoma - stage 1E - ? aggressive type -     Alzheimer Disease Father     Cerebrovascular Disease Father          2016    Snoring Father     Osteoarthritis Brother         bilateral knee replacement    Cerebrovascular Disease Maternal Grandfather         Maternal grandfather    C.A.D. Maternal Grandfather     Cancer - colorectal Paternal Grandmother         40's    Colon Cancer Paternal Grandmother         Colon surgery after WWII    C.A.D. Paternal Grandfather          at age 56    Other - See Comments Daughter         adopted    Other - See Comments Daughter         adopted       Social History    Social History     Socioeconomic History    Marital status:      Spouse name: Arabella    Number of children: 2    Years of education: 18     Highest education level: Not on file   Occupational History     Employer: [employer]   Tobacco Use    Smoking status: Former     Types: Cigars    Smokeless tobacco: Never    Tobacco comments:     Occasional cigar - maybe 12/year   Vaping Use    Vaping Use: Never used   Substance and Sexual Activity    Alcohol use: Yes     Alcohol/week: 7.0 - 8.0 standard drinks of alcohol     Types: 7 - 8 Standard drinks or equivalent per week    Drug use: No    Sexual activity: Yes     Partners: Female     Birth control/protection: None   Other Topics Concern     Service Not Asked    Blood Transfusions Not Asked    Caffeine Concern Yes     Comment: 2 cups qd    Occupational Exposure Not Asked    Hobby Hazards Not Asked    Sleep Concern Not Asked    Stress Concern Not Asked    Weight Concern Not Asked    Special Diet Not Asked    Back Care Not Asked    Exercise Yes     Comment: 5-6 days per week    Bike Helmet Not Asked    Seat Belt Yes    Self-Exams Not Asked    Parent/sibling w/ CABG, MI or angioplasty before 65F 55M? No   Social History Narrative    Not on file     Social Determinants of Health     Financial Resource Strain: Low Risk  (12/5/2023)    Financial Resource Strain     Within the past 12 months, have you or your family members you live with been unable to get utilities (heat, electricity) when it was really needed?: No   Food Insecurity: Low Risk  (12/5/2023)    Food Insecurity     Within the past 12 months, did you worry that your food would run out before you got money to buy more?: No     Within the past 12 months, did the food you bought just not last and you didn t have money to get more?: No   Transportation Needs: Low Risk  (12/5/2023)    Transportation Needs     Within the past 12 months, has lack of transportation kept you from medical appointments, getting your medicines, non-medical meetings or appointments, work, or from getting things that you need?: No   Physical Activity: Insufficiently Active  (11/8/2021)    Exercise Vital Sign     Days of Exercise per Week: 3 days     Minutes of Exercise per Session: 40 min   Stress: No Stress Concern Present (11/8/2021)    [employer] [place] [employer] - Occupational Stress Questionnaire     Feeling of Stress : Not at all   Social Connections: Moderately Integrated (11/8/2021)    Social Connection and Isolation Panel [NHANES]     Frequency of Communication with Friends and Family: More than three times a week     Frequency of Social Gatherings with Friends and Family: Twice a week     Attends Islam Services: More than 4 times per year     Active Member of Clubs or Organizations: No     Attends Club or Organization Meetings: Not on file     Marital Status:    Interpersonal Safety: Low Risk  (12/7/2023)    Interpersonal Safety     Do you feel physically and emotionally safe where you currently live?: Yes     Within the past 12 months, have you been hit, slapped, kicked or otherwise physically hurt by someone?: No     Within the past 12 months, have you been humiliated or emotionally abused in other ways by your partner or ex-partner?: No   Housing Stability: Low Risk  (12/5/2023)    Housing Stability     Do you have housing? : Yes     Are you worried about losing your housing?: No       ROS    CONSTITUTIONAL: NEGATIVE for fever, chills, change in weight  INTEGUMENTARY/SKIN: NEGATIVE for worrisome rashes, moles or lesions  EYES: NEGATIVE for vision changes or irritation  ENT/MOUTH: NEGATIVE for ear, mouth and throat problems  RESP: NEGATIVE for significant cough or SOB  BREAST: NEGATIVE for masses, tenderness or discharge  CV: NEGATIVE for chest pain, palpitations or peripheral edema  GI: NEGATIVE for nausea, abdominal pain, heartburn, or change in bowel habits  : NEGATIVE for frequency, dysuria, or hematuria  MUSCULOSKELETAL: NEGATIVE for significant arthralgias or myalgia  NEURO: NEGATIVE for weakness, dizziness or paresthesias  ENDOCRINE:  "NEGATIVE for temperature intolerance, skin/hair changes  HEME: NEGATIVE for bleeding problems  PSYCHIATRIC: NEGATIVE for changes in mood or affect    OBJECTIVE    /78   Pulse 66   Temp (!) 96.2  F (35.7  C) (Tympanic)   Resp 16   Ht 1.822 m (5' 11.75\")   Wt 101.2 kg (223 lb)   SpO2 97%   BMI 30.46 kg/m      EXAM:    GENERAL: healthy, alert and no distress  EYES: Eyes grossly normal to inspection, PERRL and conjunctivae and sclerae normal  HENT: ear canals and TM's normal, nose and mouth without ulcers or lesions  NECK: no adenopathy, no asymmetry, masses, or scars and thyroid normal to palpation  RESP: lungs clear to auscultation - no rales, rhonchi or wheezes  CV: regular rate and rhythm, normal S1 S2, no S3 or S4, no murmur, click or rub, no peripheral edema and peripheral pulses strong  ABDOMEN: soft, nontender, no hepatosplenomegaly, no masses and bowel sounds normal - 2 cm umbilical hernia   (male): testicles normal without atrophy or masses, no hernias, and penis normal without urethral discharge  RECTAL: normal sphincter tone, no rectal masses, prostate smooth, nontender without masses/nodules, and prostate 1-2+ enlarged, nontender  MS: no gross musculoskeletal defects noted, no edema  SKIN: no suspicious lesions or rashes  NEURO: Normal strength and tone, mentation intact and speech normal  PSYCH: mentation appears normal, affect normal/bright  LYMPH: no cervical, supraclavicular, axillary, or inguinal adenopathy    DIAGNOSTICS/PROCEDURES    Pending    ASSESSMENT      ICD-10-CM    1. Routine general medical examination at a health care facility  Z00.00 Comprehensive metabolic panel     Lipid panel reflex to direct LDL Fasting     CBC with platelets     CK total     UA Macroscopic with reflex to Microscopic and Culture     Albumin Random Urine Quantitative with Creat Ratio     TSH with free T4 reflex     Prostate Specific Antigen Screen     PRIMARY CARE FOLLOW-UP SCHEDULING     Comprehensive " metabolic panel     Lipid panel reflex to direct LDL Fasting     CBC with platelets     CK total     UA Macroscopic with reflex to Microscopic and Culture     Albumin Random Urine Quantitative with Creat Ratio     TSH with free T4 reflex     Prostate Specific Antigen Screen     UA Microscopic with Reflex to Culture     PRIMARY CARE FOLLOW-UP SCHEDULING     CANCELED: Fecal colorectal cancer screen FIT     CANCELED: Fecal colorectal cancer screen FIT      2. Hyperlipidemia LDL goal <130  E78.5 Comprehensive metabolic panel     Lipid panel reflex to direct LDL Fasting     CK total     PRIMARY CARE FOLLOW-UP SCHEDULING     Comprehensive metabolic panel     Lipid panel reflex to direct LDL Fasting     CK total     simvastatin (ZOCOR) 20 MG tablet     PRIMARY CARE FOLLOW-UP SCHEDULING     OFFICE/OUTPT VISIT,EST,LEVL IV      3. Obstructive sleep apnea syndrome  G47.33 TSH with free T4 reflex     PRIMARY CARE FOLLOW-UP SCHEDULING     TSH with free T4 reflex     Adult Sleep Eval & Management  Referral     PRIMARY CARE FOLLOW-UP SCHEDULING     OFFICE/OUTPT VISIT,EST,LEVL IV      4. Gastroesophageal reflux disease without esophagitis  K21.9 CBC with platelets     PRIMARY CARE FOLLOW-UP SCHEDULING     CBC with platelets     PRIMARY CARE FOLLOW-UP SCHEDULING     OFFICE/OUTPT VISIT,EST,LEVL IV      5. Family history of ischemic heart disease and other diseases of the circulatory system  Z82.49 Lipid panel reflex to direct LDL Fasting     PRIMARY CARE FOLLOW-UP SCHEDULING     Lipid panel reflex to direct LDL Fasting     PRIMARY CARE FOLLOW-UP SCHEDULING     OFFICE/OUTPT VISIT,EST,LEVL IV      6. Environmental allergies  Z91.09 PRIMARY CARE FOLLOW-UP SCHEDULING     PRIMARY CARE FOLLOW-UP SCHEDULING     OFFICE/OUTPT VISIT,EST,LEVL IV      7. Erectile dysfunction, unspecified erectile dysfunction type  N52.9 PRIMARY CARE FOLLOW-UP SCHEDULING     PRIMARY CARE FOLLOW-UP SCHEDULING     OFFICE/OUTPT VISIT,EST,LEVL IV      8.  History of colonic polyps  Z86.010 PRIMARY CARE FOLLOW-UP SCHEDULING     PRIMARY CARE FOLLOW-UP SCHEDULING     OFFICE/OUTPT VISIT,EST,LEVL IV     CANCELED: Fecal colorectal cancer screen FIT     CANCELED: Fecal colorectal cancer screen FIT      9. Tubular adenoma of colon- repeat colonoscopy in 2023 ( 5 years)   D12.6 PRIMARY CARE FOLLOW-UP SCHEDULING     PRIMARY CARE FOLLOW-UP SCHEDULING     OFFICE/OUTPT VISIT,EST,LEVL IV     CANCELED: Fecal colorectal cancer screen FIT     CANCELED: Fecal colorectal cancer screen FIT      10. Screen for colon cancer  Z12.11 PRIMARY CARE FOLLOW-UP SCHEDULING     PRIMARY CARE FOLLOW-UP SCHEDULING     OFFICE/OUTPT VISIT,EST,LEVL IV     CANCELED: Fecal colorectal cancer screen FIT     CANCELED: Fecal colorectal cancer screen FIT      11. Screening for prostate cancer  Z12.5 Prostate Specific Antigen Screen     PRIMARY CARE FOLLOW-UP SCHEDULING     Prostate Specific Antigen Screen     PRIMARY CARE FOLLOW-UP SCHEDULING     OFFICE/OUTPT VISIT,EST,LEVL IV      12. Class 1 obesity with serious comorbidity and body mass index (BMI) of 30.0 to 30.9 in adult, unspecified obesity type  E66.9 PRIMARY CARE FOLLOW-UP SCHEDULING    Z68.30 PRIMARY CARE FOLLOW-UP SCHEDULING     OFFICE/OUTPT VISIT,EST,LEVL IV      13. Medication monitoring encounter  Z51.81 Comprehensive metabolic panel     Lipid panel reflex to direct LDL Fasting     CBC with platelets     CK total     UA Macroscopic with reflex to Microscopic and Culture     Albumin Random Urine Quantitative with Creat Ratio     TSH with free T4 reflex     PRIMARY CARE FOLLOW-UP SCHEDULING     Comprehensive metabolic panel     Lipid panel reflex to direct LDL Fasting     CBC with platelets     CK total     UA Macroscopic with reflex to Microscopic and Culture     Albumin Random Urine Quantitative with Creat Ratio     TSH with free T4 reflex     UA Microscopic with Reflex to Culture     PRIMARY CARE FOLLOW-UP SCHEDULING     OFFICE/OUTPT  VISIT,EST,LEVL IV     CANCELED: Fecal colorectal cancer screen FIT     CANCELED: Fecal colorectal cancer screen FIT      14. Need for COVID-19 vaccine  Z23 COVID-19 12+ (2023-24) (PFIZER)     PRIMARY CARE FOLLOW-UP SCHEDULING     PRIMARY CARE FOLLOW-UP SCHEDULING     OFFICE/OUTPT VISIT,EST,LEVL IV      15. Need for RSV immunization  Z29.11 RSV VACCINE (ABRYSVO)     PRIMARY CARE FOLLOW-UP SCHEDULING     PRIMARY CARE FOLLOW-UP SCHEDULING     OFFICE/OUTPT VISIT,EST,LEVL IV          PLAN    Discussed treatment/modality options, including risk and benefits, he desires:    advised alcohol consumption 1oz per day or less, advised aspirin 81 mg po daily, advised 1 multivitamin per day, advised calcium 5526-5430 mg/d and Vitamin D 800-1200 IU/d, advised dentist every 6 months, advised diet and exercise, advised opthalmologist every 1-2 years, advised self testicular exam q month, further health care maintenance, further lab(s), immunization(s), medication refill(s), and observation    Discussed controversies surrounding PSA. Specifically reviewed 2017 USPSTF findings recommending discussion of PSA testing for men ages 55-69.  Reviewed findings of the  Randomized Study of Screening for Prostate Cancer which showed a 30% reduction in advanced stage prostate cancer and a 20% reduction in death rate from prostate cancer in this age group. PSA-based screening may prevent up to 2 deaths and up to 3 cases of metastatic disease per 1,000 men screened over 13 years.    We've elected to do PSA this year after discussing these controversies.    All diagnosis above reviewed and noted above, otherwise stable.      [facility] for further details.      1) med refills    2) labs pending    3) immunizations - COVID / RSV    4) Dermatology - Dr Palacios    5) Sleep consult    No follow-ups on file.    Health Maintenance Due   Topic Date Due    IPV IMMUNIZATION (2 of 3 - Adult catch-up series) 02/18/2011    PSA  12/07/2023  "      COUNSELING    Reviewed preventive health counseling, as reflected in patient instructions    BP Readings from Last 1 Encounters:   12/07/23 136/78     Estimated body mass index is 30.46 kg/m  as calculated from the following:    Height as of this encounter: 1.822 m (5' 11.75\").    Weight as of this encounter: 101.2 kg (223 lb).      Weight management plan: diet      reports that he has quit smoking. His smoking use included cigars. He has never used smokeless tobacco.      Counseling Resources:    ATP IV Guidelines  Pooled Cohorts Equation Calculator  FRAX Risk Assessment  ICSI Preventive Guidelines  Dietary Guidelines for Americans, 2010  [employer]'s MyPlate  ASA Prophylaxis  Lung CA Screening           Juan C Palacio MD, FAAFP     [facility]  [address] [place] [address]  [email or web address]   Office: [phone]  Fax: [phone]  Pager: [phone]     Answers submitted by the patient for this visit:  Annual Preventive Visit (Submitted on 12/5/2023)  Chief Complaint: Annual Exam:  abdominal pain: No  Blood in stool: No  Blood in urine: No  chest pain: No  chills: No  congestion: No  constipation: No  cough: No  diarrhea: No  dizziness: No  ear pain: No  eye pain: No  nervous/anxious: No  fever: No  frequency: No  genital sores: No  headaches: No  hearing loss: No  heartburn: No  arthralgias: No  joint swelling: No  peripheral edema: No  mood changes: No  myalgias: No  nausea: No  dysuria: No  palpitations: No  Skin sensation changes: No  sore throat: No  urgency: No  rash: No  shortness of breath: No  visual disturbance: No  weakness: No  impotence: No  penile discharge: No    "

## 2023-12-07 NOTE — LETTER
December 11, 2023      Nam May  [id number] [facility] [id number]        Dear ,    We are writing to inform you of your test results.    Labs are overall quite good     We advise:     Continue current cares.   Balanced low cholesterol diet.   Regular exercise.     For additional lab test information, [email or web address] is an excellent reference.       Resulted Orders   Comprehensive metabolic panel   Result Value Ref Range    Sodium 140 135 - 145 mmol/L      Comment:      Reference intervals for this test were updated on 09/26/2023 to more accurately reflect our healthy population. There may be differences in the flagging of prior results with similar values performed with this method. Interpretation of those prior results can be made in the context of the updated reference intervals.     Potassium 4.2 3.4 - 5.3 mmol/L    Carbon Dioxide (CO2) 23 22 - 29 mmol/L    Anion Gap 11 7 - 15 mmol/L    Urea Nitrogen 16.7 8.0 - 23.0 mg/dL    Creatinine 0.92 0.67 - 1.17 mg/dL    GFR Estimate >90 >60 mL/min/1.73m2    Calcium 9.4 8.8 - 10.2 mg/dL    Chloride 106 98 - 107 mmol/L    Glucose 92 70 - 99 mg/dL    Alkaline Phosphatase 69 40 - 150 U/L      Comment:      Reference intervals for this test were updated on 11/14/2023 to more accurately reflect our healthy population. There may be differences in the flagging of prior results with similar values performed with this method. Interpretation of those prior results can be made in the context of the updated reference intervals.    AST 29 0 - 45 U/L      Comment:      Reference intervals for this test were updated on 6/12/2023 to more accurately reflect our healthy population. There may be differences in the flagging of prior results with similar values performed with this method. Interpretation of those prior results can be made in the context of the updated reference intervals.    ALT 34 0 - 70 U/L      Comment:      Reference intervals for this test were  updated on 6/12/2023 to more accurately reflect our healthy population. There may be differences in the flagging of prior results with similar values performed with this method. Interpretation of those prior results can be made in the context of the updated reference intervals.      Protein Total 6.9 6.4 - 8.3 g/dL    Albumin 4.6 3.5 - 5.2 g/dL    Bilirubin Total 1.2 <=1.2 mg/dL   Lipid panel reflex to direct LDL Fasting   Result Value Ref Range    Cholesterol 174 <200 mg/dL    Triglycerides 100 <150 mg/dL    Direct Measure HDL 50 >=40 mg/dL    LDL Cholesterol Calculated 104 (H) <=100 mg/dL    Non HDL Cholesterol 124 <130 mg/dL    Patient Fasting > 8hrs? Yes     Narrative    Cholesterol  Desirable:  <200 mg/dL    Triglycerides  Normal:  Less than 150 mg/dL  Borderline High:  150-199 mg/dL  High:  200-499 mg/dL  Very High:  Greater than or equal to 500 mg/dL    Direct Measure HDL  Female:  Greater than or equal to 50 mg/dL   Male:  Greater than or equal to 40 mg/dL    LDL Cholesterol  Desirable:  <100mg/dL  Above Desirable:  100-129 mg/dL   Borderline High:  130-159 mg/dL   High:  160-189 mg/dL   Very High:  >= 190 mg/dL    Non HDL Cholesterol  Desirable:  130 mg/dL  Above Desirable:  130-159 mg/dL  Borderline High:  160-189 mg/dL  High:  190-219 mg/dL  Very High:  Greater than or equal to 220 mg/dL   CBC with platelets   Result Value Ref Range    WBC Count 6.2 4.0 - 11.0 10e3/uL    RBC Count 4.80 4.40 - 5.90 10e6/uL    Hemoglobin 14.9 13.3 - 17.7 g/dL    Hematocrit 43.8 40.0 - 53.0 %    MCV 91 78 - 100 fL    MCH 31.0 26.5 - 33.0 pg    MCHC 34.0 31.5 - 36.5 g/dL    RDW 12.6 10.0 - 15.0 %    Platelet Count 255 150 - 450 10e3/uL   CK total   Result Value Ref Range     39 - 308 U/L   UA Macroscopic with reflex to Microscopic and Culture   Result Value Ref Range    Color Urine Yellow Colorless, Straw, Light Yellow, Yellow    Appearance Urine Clear Clear    Glucose Urine Negative Negative mg/dL    Bilirubin Urine  Negative Negative    Ketones Urine Negative Negative mg/dL    Specific Gravity Urine 1.020 1.003 - 1.035    Blood Urine Trace (A) Negative    pH Urine 6.5 5.0 - 7.0    Protein Albumin Urine Negative Negative mg/dL    Urobilinogen Urine 0.2 0.2, 1.0 E.U./dL    Nitrite Urine Negative Negative    Leukocyte Esterase Urine Negative Negative   Albumin Random Urine Quantitative with Creat Ratio   Result Value Ref Range    Creatinine Urine mg/dL 208.0 mg/dL      Comment:      The reference ranges have not been established in urine creatinine. The results should be integrated into the clinical context for interpretation.    Albumin Urine mg/L <12.0 mg/L      Comment:      The reference ranges have not been established in urine albumin. The results should be integrated into the clinical context for interpretation.    Albumin Urine mg/g Cr        Comment:      Unable to calculate, urine albumin and/or urine creatinine is outside detectable limits.  Microalbuminuria is defined as an albumin:creatinine ratio of 17 to 299 for males and 25 to 299 for females. A ratio of albumin:creatinine of 300 or higher is indicative of overt proteinuria.  Due to biologic variability, positive results should be confirmed by a second, first-morning random or 24-hour timed urine specimen. If there is discrepancy, a third specimen is recommended. When 2 out of 3 results are in the microalbuminuria range, this is evidence for incipient nephropathy and warrants increased efforts at glucose control, blood pressure control, and institution of therapy with an angiotensin-converting-enzyme (ACE) inhibitor (if the patient can tolerate it).     TSH with free T4 reflex   Result Value Ref Range    TSH 2.58 0.30 - 4.20 uIU/mL   Prostate Specific Antigen Screen   Result Value Ref Range    Prostate Specific Antigen Screen 1.86 0.00 - 4.50 ng/mL    Narrative    This result is obtained using the Roche Elecsys total PSA method on the elpidio e801 immunoassay  analyzer. Results obtained with different assay methods or kits cannot be used interchangeably.   UA Microscopic with Reflex to Culture   Result Value Ref Range    RBC Urine 0-2 0-2 /HPF /HPF    WBC Urine 0-5 0-5 /HPF /HPF    Mucus Urine Present (A) None Seen /LPF    Narrative    Urine Culture not indicated       If you have any questions or concerns, please call the clinic at the number listed above.       Sincerely,            Juan C Palacio M.D.

## 2023-12-11 ENCOUNTER — MYC MEDICAL ADVICE (OUTPATIENT)
Dept: FAMILY MEDICINE | Facility: CLINIC | Age: 63
End: 2023-12-11
Payer: COMMERCIAL

## 2023-12-13 NOTE — TELEPHONE ENCOUNTER
Please see my chart message below     Please review and advise - it was on his  HM list per his OV notes - do you want pt to get this?     Thank you     Tamiko Pearson RN, BSN  [facility]

## 2023-12-13 NOTE — TELEPHONE ENCOUNTER
Please review immunizations with patient, no IPV is indicated unless foreign travel to certain countries

## 2024-05-09 ASSESSMENT — SLEEP AND FATIGUE QUESTIONNAIRES
HOW LIKELY ARE YOU TO NOD OFF OR FALL ASLEEP WHEN YOU ARE A PASSENGER IN A CAR FOR AN HOUR WITHOUT A BREAK: WOULD NEVER DOZE
HOW LIKELY ARE YOU TO NOD OFF OR FALL ASLEEP WHILE SITTING INACTIVE IN A PUBLIC PLACE: WOULD NEVER DOZE
HOW LIKELY ARE YOU TO NOD OFF OR FALL ASLEEP WHILE LYING DOWN TO REST IN THE AFTERNOON WHEN CIRCUMSTANCES PERMIT: SLIGHT CHANCE OF DOZING
HOW LIKELY ARE YOU TO NOD OFF OR FALL ASLEEP WHILE SITTING QUIETLY AFTER LUNCH WITHOUT ALCOHOL: WOULD NEVER DOZE
HOW LIKELY ARE YOU TO NOD OFF OR FALL ASLEEP WHILE SITTING AND READING: SLIGHT CHANCE OF DOZING
HOW LIKELY ARE YOU TO NOD OFF OR FALL ASLEEP WHILE SITTING AND TALKING TO SOMEONE: WOULD NEVER DOZE
HOW LIKELY ARE YOU TO NOD OFF OR FALL ASLEEP IN A CAR, WHILE STOPPED FOR A FEW MINUTES IN TRAFFIC: WOULD NEVER DOZE
HOW LIKELY ARE YOU TO NOD OFF OR FALL ASLEEP WHILE WATCHING TV: SLIGHT CHANCE OF DOZING

## 2024-05-13 NOTE — PROGRESS NOTES
"Virtual Visit Details    Type of service:  Video Visit     Originating Location (pt. Location): Home    Distant Location (provider location):  Off-site  Platform used for Video Visit: [facility]    Outpatient Sleep Medicine Consultation:      Name: Nam May MRN# [id number]   Age: 63 year old YOB: 1960     Date of Consultation: May 13, 2024  Consultation is requested by: Juan C Palacio MD  [address] [place] [address] Juan C Palacio  Primary care provider: Juan C Palacio       Reason for Sleep Consult:     Nam May is sent by Juan C Palacio for a sleep consultation regarding BRAXTON    Patient s Reason for visit  Nam Gardnerbill main reason for visit: Approval for new CPAP  Patient states problem(s) started: The detailed records of my sleep study back in 2010 have been lost, except the confirmation that I had one (according to [place]).  So, I am unable to get a new device without doctor approval.  My current (old) CPAP machine too noisy when used.  Nam May's goals for this visit: Ability to purchase new device, especially as we come into the hay fever season.  My current machine starts at 4.0, then \"ramps up\" to 5.0.  Open to other devices besides a new CPAP machine based on my current good quality sleep.         Assessment and Plan:     Summary Sleep Diagnoses:  1. BRAXTON (obstructive sleep apnea)  Comorbid Diagnoses:  2. Environmental allergies    Patient presents to clinic today to establish care of his historically diagnosed mild BRAXTON treated with CPAP as needed.  Admits he only uses a CPAP with seasonal allergy flares where nasal congestion causes worsening of snoring and difficulty breathing through his nose, finds CPAP is helpful during these times but does not use regularly.  Primary reason for today's visit is because he would like prescription for new machine, his current machine is very loud and over 10 years of age.  Discussed that given insufficient documentation of prior study he " will need to update sleep study before insurance will cover new machine for him.  Also discussed given age of old study, improvement in symptoms, and no significant sleep problems may be a good idea to update study as well to reevaluate current sleep disordered breathing severity to see if CPAP is still even needed.  He is open to updated testing and we agreed to pursue home sleep apnea test given lack of significant comorbidity requiring in lab assessment.  Will plan to send him results of sleep study via [employer] message once results are available and place empiric auto CPAP orders at that time assuming testing is positive to expedite set up with new machine.  Additionally, given nasal congestion/allergy symptoms interfere with his sleep encouraged him to start on treatment.  Reports has been on several medications over the years for his allergies and after discussion of options we agreed to have him start saline sinus rinses at bedtime followed by Flonase Sensimist (didn't tolerate alcohol base of classic Flonase in past).  Prescription also written for azelastine nasal spray to be used in addition to Flonase as needed during bad flares.  Could consider sending him to allergy as well if these ineffective, history of allergy IT years ago.  - HST-Home Sleep Apnea Test - Noxturnal Returnable; Future         History of Present Illness:     Nam May is a 63 year old male with obesity, GERD, allergic rhinitis, history of BRAXTON on CPAP who presents to clinic today for evaluation of BRAXTON.     Past Sleep Evaluations: We have a copy of prior diagnostic PSG interpretation completed through [facility] 3/31/2011 showing moderate to loud snoring with AHI 10.9, RDI 31.1, oxygen corky 79%.  No other abnormal findings noted.  No weight documented on interpretation.  No raw data from study.     Over the years has been on CPAP therapy but uses sporadically, predominantly just a nasal congestion causes difficulty  "with breathing and worsening of snoring.  Patient states \" I sleep really well I don't have sleep issues I don't do naps I am a deep sleeper too and so my problem that I have is my sinuses and I have seasonal allergies in the fall is bad so I just use my CPAP on the days I have a hard time breathing through my nose\". Has an old CPAP machine - machine is \"an old [employer] XT1\" that makes a lot of noise, interested in replacement.     SLEEP-WAKE SCHEDULE:     Work/School Days: Patient goes to school/work: Yes   Usually gets into bed at 9;30-10PM.  Takes patient about Less than 2 minutes. to fall asleep  Has trouble falling asleep Less than 1.  Bad allergy days, or up later than usual may take more time to fall asleep. nights per week  Wakes up in the middle of the night I usually don't as long as I don't consume any significant liquids after 8PM. times.  Wakes up due to External stimuli (bed partner, pets, noise, etc);Use the bathroom;Other  He has trouble falling back asleep Rarely times a week.   It usually takes A minute, or less to get back to sleep  Patient is usually up at 5:30-6AM  Uses alarm: No    Weekends/Non-work Days/All Other Days:  Usually gets into bed at 10-11PM   Takes patient about Less than 2 minutes to fall asleep  Patient is usually up at 5:30-6AM  Uses alarm: No    Sleep Need  Patient gets  7-8/night sleep on average   Patient thinks he needs about I feel I'm getting good sleep.  I'm very active during the day, don't get sleepy (i.e. can't remember the last time I took a nap). sleep    Dirk R Lalo prefers to sleep in this position(s): Side   Patient states they do the following activities in bed: Watch TV    Naps  Patient takes a purposeful nap 0 times a week  He dozes off unintentionally 0 days per week  Patient has had a driving accident or near-miss due to sleepiness/drowsiness: No    SLEEP DISRUPTIONS:    Breathing/Snoring  Patient snores:Yes  Other people complain about his snoring: " Yes  Patient has been told he stops breathing in his sleep:No  He has issues with the following: Stuffy nose when you wake up    Movement:  Patient gets pain, discomfort, with an urge to move:  No  Patient has been told he kicks his legs at night:  No     Behaviors in Sleep:  Denies sleepwalking, sleep talking, sleep eating, bruxism, dream enactment, recurring nightmares, night terrors, sleep paralysis, hypnagogic/hypnopompic hallucinations, cataplexy    Is there anything else you would like your sleep provider to know: I do have a deviated septum.    CAFFEINE AND OTHER SUBSTANCES:    Patient consumes caffeinated beverages per day:  2-3 in the morning  Last caffeine use is usually: 10AM  List of any prescribed or over the counter stimulants that patient takes: None  List of any prescribed or over the counter sleep medication patient takes: None  List of previous sleep medications that patient has tried: None  Patient drinks alcohol to help them sleep: No  Patient drinks alcohol near bedtime: No    Family History:  Patient has a family member been diagnosed with a sleep disorder: Yes  Mother has been recently diagnosed with severe apnea and has been prescribed a CPAP.     SCALES:    EPWORTH SLEEPINESS SCALE         5/9/2024    11:29 AM    [place] Sleepiness Scale ( MARJORIE Arias  [id number]<br>ESS - [place]/English - Final version - 21 Nov 07 - [facility] [place].)   Sitting and reading Slight chance of dozing   Watching TV Slight chance of dozing   Sitting, inactive in a public place (e.g. a theatre or a meeting) Would never doze   As a passenger in a car for an hour without a break Would never doze   Lying down to rest in the afternoon when circumstances permit Slight chance of dozing   Sitting and talking to someone Would never doze   Sitting quietly after a lunch without alcohol Would never doze   In a car, while stopped for a few minutes in traffic Would never doze   [place] Score (MC) 3   [place] Score (Sleep) 3          INSOMNIA SEVERITY INDEX (SEVERO)          5/9/2024    11:00 AM   Insomnia Severity Index (SEVERO)   Difficulty falling asleep 0   Difficulty staying asleep 0   Problems waking up too early 0   How SATISFIED/DISSATISFIED are you with your CURRENT sleep pattern? 1   How NOTICEABLE to others do you think your sleep problem is in terms of impairing the quality of your life? 0   How WORRIED/DISTRESSED are you about your current sleep problem? 1   To what extent do you consider your sleep problem to INTERFERE with your daily functioning (e.g. daytime fatigue, mood, ability to function at work/daily chores, concentration, memory, mood, etc.) CURRENTLY? 0   SEVERO Total Score 2       Guidelines for Scoring/Interpretation:  Total score categories:  0-7 = No clinically significant insomnia   8-14 = Subthreshold insomnia   15-21 = Clinical insomnia (moderate severity)  22-28 = Clinical insomnia (severe)  Used via courtesy of www.DrawQuest.va.gov with permission from Rikki Louise PhD., [facility]    Allergies:    Allergies   Allergen Reactions    Seasonal Allergies        Medications:    Current Outpatient Medications   Medication Sig Dispense Refill    ASPIRIN 81 MG OR TABS 1 TABLET DAILY      MULTIVITAMIN OR None Entered      Omega-3 Krill Oil 500 MG CAPS       simvastatin (ZOCOR) 20 MG tablet Take 1 tablet (20 mg) by mouth at bedtime 90 tablet 3       Problem List:  Patient Active Problem List    Diagnosis Date Noted    Hyperlipidemia LDL goal <130 10/31/2010     Priority: High    Class 1 obesity with serious comorbidity and body mass index (BMI) of 31.0 to 31.9 in adult, unspecified obesity type 12/07/2022     Priority: Medium    Environmental allergies 12/07/2022     Priority: Medium    Family history of ischemic heart disease and other diseases of the circulatory system 12/07/2022     Priority: Medium    Tubular adenoma of colon- repeat colonoscopy in 2023 ( 5 years)  08/01/2018     Priority: Medium     6 mm  polyp      Right knee pain 04/13/2018     Priority: Medium    Hip pain, right 04/13/2018     Priority: Medium    Retinal tear of left eye      Priority: Medium     VRS      ED (erectile dysfunction)      Priority: Medium     mild - Dr Cotto      Sleep apnea      Priority: Medium     severe - CPAP      GERD (gastroesophageal reflux disease)      Priority: Medium    History of colonic polyps 02/11/2004     Priority: Medium     Problem list name updated by automated process. Provider to review      Seasonal allergies      Priority: Low     Allergic rhinitis H/O AIT - ragweed          Past Medical/Surgical History:  Past Medical History:   Diagnosis Date    Colon polyp 08/2018    6 mm polyp    ED (erectile dysfunction) 2013    mild - Dr Cotto    Family history of malignant neoplasm of gastrointestinal tract     PGM - colon ca    GERD (gastroesophageal reflux disease)     Hyperlipidemia LDL goal <130 2008    Personal history of colonic polyps 12/99, 12/02, 3/05, 12/08    adenoma, tubular adenoma     Retinal tear of left eye 2/15    VRS    Seasonal allergies summer/fall    Allergic rhinitis H/O AIT - ragweed    Sleep apnea 4/11    severe - CPAP     Past Surgical History:   Procedure Laterality Date    BIOPSY  4/2021    Moles (negative)    COLONOSCOPY  12/99, 12/02, 3/05, 12/08, 3/13, 8/18    tubular adenoma - 6 mm polyp, due 5 yrs - 8/2023    COLONOSCOPY N/A 11/08/2023    negative - due 5 yrs    EYE SURGERY Left 02/2015    VRS - left eye surgery    HC REMOVE TONSILS/ADENOIDS,<11 Y/O  1964    T & A <12y.o.    ORTHOPEDIC SURGERY Right 01/2003    Right toe fusion, miniscus artho - right knee    STRESS ECHO ([facility])  10/2008    normal    SURGICAL HISTORY OF -  Right 09/2010    Rt Knee Arthroscopy - Dr Curtis    SURGICAL HISTORY OF -   03/2021    Left Calf repair       Social History:  Social History     Socioeconomic History    Marital status:      Spouse name: Arabella    Number of children: 2    Years of  education: 18    Highest education level: Not on file   Occupational History     Employer: [employer]   Tobacco Use    Smoking status: Former     Types: Cigars    Smokeless tobacco: Never    Tobacco comments:     Occasional cigar - maybe 12/year   Vaping Use    Vaping status: Never Used   Substance and Sexual Activity    Alcohol use: Yes     Alcohol/week: 7.0 - 8.0 standard drinks of alcohol     Types: 7 - 8 Standard drinks or equivalent per week    Drug use: No    Sexual activity: Yes     Partners: Female     Birth control/protection: None   Other Topics Concern     Service Not Asked    Blood Transfusions Not Asked    Caffeine Concern Yes     Comment: 2 cups qd    Occupational Exposure Not Asked    Hobby Hazards Not Asked    Sleep Concern Not Asked    Stress Concern Not Asked    Weight Concern Not Asked    Special Diet Not Asked    Back Care Not Asked    Exercise Yes     Comment: 5-6 days per week    Bike Helmet Not Asked    Seat Belt Yes    Self-Exams Not Asked    Parent/sibling w/ CABG, MI or angioplasty before 65F 55M? No   Social History Narrative    Not on file     Social Determinants of Health     Financial Resource Strain: Low Risk  (12/5/2023)    Financial Resource Strain     Within the past 12 months, have you or your family members you live with been unable to get utilities (heat, electricity) when it was really needed?: No   Food Insecurity: Low Risk  (12/5/2023)    Food Insecurity     Within the past 12 months, did you worry that your food would run out before you got money to buy more?: No     Within the past 12 months, did the food you bought just not last and you didn t have money to get more?: No   Transportation Needs: Low Risk  (12/5/2023)    Transportation Needs     Within the past 12 months, has lack of transportation kept you from medical appointments, getting your medicines, non-medical meetings or appointments, work, or from getting things that you need?: No   Physical Activity:  Insufficiently Active (2021)    Exercise Vital Sign     Days of Exercise per Week: 3 days     Minutes of Exercise per Session: 40 min   Stress: No Stress Concern Present (2021)    [place] [employer] - Occupational Stress Questionnaire     Feeling of Stress : Not at all   Social Connections: Moderately Integrated (2021)    Social Connection and Isolation Panel [NHANES]     Frequency of Communication with Friends and Family: More than three times a week     Frequency of Social Gatherings with Friends and Family: Twice a week     Attends Hoahaoism Services: More than 4 times per year     Active Member of Clubs or Organizations: No     Attends Club or Organization Meetings: Not on file     Marital Status:    Interpersonal Safety: Low Risk  (2023)    Interpersonal Safety     Do you feel physically and emotionally safe where you currently live?: Yes     Within the past 12 months, have you been hit, slapped, kicked or otherwise physically hurt by someone?: No     Within the past 12 months, have you been humiliated or emotionally abused in other ways by your partner or ex-partner?: No   Housing Stability: Low Risk  (2023)    Housing Stability     Do you have housing? : Yes     Are you worried about losing your housing?: No       Family History:  Family History   Problem Relation Age of Onset    Obesity Mother     Hypertension Mother     Snoring Mother     Cancer Father         lymphoma - stage 1E - ? aggressive type -     Alzheimer Disease Father     Cerebrovascular Disease Father          2016    Snoring Father     Osteoarthritis Brother         bilateral knee replacement    Cerebrovascular Disease Maternal Grandfather         Maternal grandfather    ORIONATAMARA Maternal Grandfather     Cancer - colorectal Paternal Grandmother         40's    Colon Cancer Paternal Grandmother         Colon surgery after WWII    CECILE Paternal Grandfather          at age 56     Other - See Comments Daughter         adopted    Other - See Comments Daughter         adopted       Review of Systems:  In the last TWO WEEKS have you experienced any of the following symptoms?  Fevers: No  Night Sweats: No  Weight Gain: No  Pain at Night: No  Double Vision: No  Changes in Vision: No  Difficulty Breathing through Nose: No  Sore Throat in Morning: No  Dry Mouth in the Morning: No  Shortness of Breath Lying Flat: No  Shortness of Breath With Activity: No  Awakening with Shortness of Breath: No  Increased Cough: No  Heart Racing at Night: No  Swelling in Feet or Legs: No  Diarrhea at Night: No  Heartburn at Night: No  Urinating More than Once at Night: No  Losing Control of Urine at Night: No  Joint Pains at Night: No  Headaches in Morning: No  Weakness in Arms or Legs: No  Depressed Mood: No  Anxiety: No     Physical Examination:  Vitals: Ht 1.829 m (6')   Wt 99.8 kg (220 lb)   BMI 29.84 kg/m    BMI= Body mass index is 29.84 kg/m .  General appearance: Awake, alert, cooperative. Well groomed. Sitting comfortably in chair. In no apparent distress.  HEENT: Head: Normocephalic, atraumatic. Eyes:Conjunctiva clear. Sclera normal. Nose: External appearance without deformity.   Pulmonary:  Able to speak easily in full sentences. No cough or wheeze.   Skin:  No rashes or significant lesions on visible skin.   Neurologic: Alert, oriented x3.   Psychiatric: Mood euthymic. Affect congruent with full range and intensity.           Data: All pertinent previous laboratory data reviewed     Recent Labs   Lab Test 12/07/23 0729 12/07/22  1126    142   POTASSIUM 4.2 4.5   CHLORIDE 106 105   CO2 23 25   ANIONGAP 11 12   GLC 92 83   BUN 16.7 18.4   CR 0.92 1.03   RHODA 9.4 9.4       Recent Labs   Lab Test 12/07/23 0729   WBC 6.2   RBC 4.80   HGB 14.9   HCT 43.8   MCV 91   MCH 31.0   MCHC 34.0   RDW 12.6          Recent Labs   Lab Test 12/07/23 0729   PROTTOTAL 6.9   ALBUMIN 4.6   BILITOTAL 1.2  "  ALKPHOS 69   AST 29   ALT 34       TSH   Date Value   12/07/2023 2.58 uIU/mL   12/07/2022 1.60 uIU/mL   11/08/2021 1.34 mU/L   03/25/2019 1.81 mU/L   12/15/2017 1.61 mU/L       No results found for: \"UAMP\", \"UBARB\", \"BENZODIAZEUR\", \"UCANN\", \"UCOC\", \"OPIT\", \"UPCP\"    No results found for: \"IRONSAT\", \"GN94980\", \"RAJ\"    No results found for: \"PH\", \"PHARTERIAL\", \"PO2\", \"MN6MOSBPTYG\", \"SAT\", \"PCO2\", \"HCO3\", \"BASEEXCESS\", \"GERA\", \"BEB\"    @LABRCNTIPR(phv:4,pco2v:4,po2v:4,hco3v:4,[place]:4,o2per:4)@    Echocardiology: No results found for this or any previous visit (from the past 4320 hour(s)).    Chest x-ray: No results found for this or any previous visit from the past 365 days.      Chest CT: No results found for this or any previous visit from the past 365 days.      PFT: Most Recent [facility] Pulmonary Function Testing    No results found for: \"20001\"  No results found for: \"20002\"  No results found for: \"20003\"  No results found for: \"20015\"  No results found for: \"20016\"  No results found for: \"20027\"  No results found for: \"20028\"  No results found for: \"20029\"  No results found for: \"20079\"  No results found for: \"20080\"  No results found for: \"20081\"  No results found for: \"20335\"  No results found for: \"20105\"  No results found for: \"20053\"  No results found for: \"20054\"  No results found for: \"20055\"      Katlin Sexton PA-C 5/13/2024     [facility]  [address]  [address], [place] [address]     [facility]  [address], [place] [address]    Chart documentation was completed, in part, with [employer] voice-recognition software. Even though reviewed, some grammatical, spelling, and word errors may remain.    49 minutes spent on day of encounter reviewing medical records, history and physical examination, review of previous testing and interpretation, documentation and further activities as noted above        "

## 2024-05-14 ENCOUNTER — VIRTUAL VISIT (OUTPATIENT)
Dept: SLEEP MEDICINE | Facility: CLINIC | Age: 64
End: 2024-05-14
Payer: COMMERCIAL

## 2024-05-14 VITALS — BODY MASS INDEX: 29.8 KG/M2 | WEIGHT: 220 LBS | HEIGHT: 72 IN

## 2024-05-14 DIAGNOSIS — Z91.09 ENVIRONMENTAL ALLERGIES: ICD-10-CM

## 2024-05-14 DIAGNOSIS — G47.33 OSA (OBSTRUCTIVE SLEEP APNEA): Primary | ICD-10-CM

## 2024-05-14 PROCEDURE — 99214 OFFICE O/P EST MOD 30 MIN: CPT | Mod: 95 | Performed by: PHYSICIAN ASSISTANT

## 2024-05-14 RX ORDER — AZELASTINE 1 MG/ML
1 SPRAY, METERED NASAL 2 TIMES DAILY PRN
Qty: 30 ML | Refills: 3 | Status: SHIPPED | OUTPATIENT
Start: 2024-05-14

## 2024-05-14 ASSESSMENT — PAIN SCALES - GENERAL: PAINLEVEL: NO PAIN (0)

## 2024-05-14 NOTE — NURSING NOTE
Patient denies any changes since echeck-in regarding medication and allergies and states all information entered during echeck-in remains accurate.    Is the patient currently in the state of [place]? YES    Visit mode:VIDEO    If the visit is dropped, the patient can be reconnected by: VIDEO VISIT: Send to e-mail at: [email or web address]    Will anyone else be joining the visit? NO  (If patient encounters technical issues they should call [phone]    How would you like to obtain your AVS? MyChart    Are changes needed to the allergy or medication list? No, Pt stated no changes to allergies, and Pt stated no med changes    Are refills needed on medications prescribed by this physician? NO    Has patient had flu shot for current/most recent flu season? If so, when? Yes: 9/14/23    Reason for visit: Consult (Consult to discuss obstructive sleep apnea, no initial questions/concerns per pt. Pt would like new CPAP machine. Medications/allergies reviewed by pt via [employer], no changes per pt. )    Laurel Valdez, Visit Facilitator/MA.

## 2024-05-28 ENCOUNTER — TRANSFERRED RECORDS (OUTPATIENT)
Dept: HEALTH INFORMATION MANAGEMENT | Facility: CLINIC | Age: 64
End: 2024-05-28
Payer: COMMERCIAL

## 2024-08-04 ENCOUNTER — OFFICE VISIT (OUTPATIENT)
Dept: FAMILY MEDICINE | Facility: CLINIC | Age: 64
End: 2024-08-04
Payer: COMMERCIAL

## 2024-08-04 VITALS
SYSTOLIC BLOOD PRESSURE: 171 MMHG | RESPIRATION RATE: 16 BRPM | WEIGHT: 220 LBS | OXYGEN SATURATION: 98 % | TEMPERATURE: 97.4 F | HEART RATE: 58 BPM | BODY MASS INDEX: 29.84 KG/M2 | DIASTOLIC BLOOD PRESSURE: 91 MMHG

## 2024-08-04 DIAGNOSIS — H91.91 HEARING LOSS OF RIGHT EAR, UNSPECIFIED HEARING LOSS TYPE: Primary | ICD-10-CM

## 2024-08-04 DIAGNOSIS — H61.21 IMPACTED CERUMEN OF RIGHT EAR: ICD-10-CM

## 2024-08-04 PROCEDURE — 99213 OFFICE O/P EST LOW 20 MIN: CPT | Mod: 25

## 2024-08-04 PROCEDURE — 69209 REMOVE IMPACTED EAR WAX UNI: CPT | Mod: RT

## 2024-08-04 RX ORDER — PREDNISONE 20 MG/1
60 TABLET ORAL DAILY
Qty: 30 TABLET | Refills: 0 | Status: SHIPPED | OUTPATIENT
Start: 2024-08-04 | End: 2024-08-14

## 2024-08-04 ASSESSMENT — SLEEP AND FATIGUE QUESTIONNAIRES
HOW LIKELY ARE YOU TO NOD OFF OR FALL ASLEEP WHILE LYING DOWN TO REST IN THE AFTERNOON WHEN CIRCUMSTANCES PERMIT: SLIGHT CHANCE OF DOZING
HOW LIKELY ARE YOU TO NOD OFF OR FALL ASLEEP IN A CAR, WHILE STOPPED FOR A FEW MINUTES IN TRAFFIC: WOULD NEVER DOZE
HOW LIKELY ARE YOU TO NOD OFF OR FALL ASLEEP WHILE SITTING AND READING: WOULD NEVER DOZE
HOW LIKELY ARE YOU TO NOD OFF OR FALL ASLEEP WHILE SITTING INACTIVE IN A PUBLIC PLACE: WOULD NEVER DOZE
HOW LIKELY ARE YOU TO NOD OFF OR FALL ASLEEP WHILE WATCHING TV: MODERATE CHANCE OF DOZING
HOW LIKELY ARE YOU TO NOD OFF OR FALL ASLEEP WHILE SITTING AND TALKING TO SOMEONE: WOULD NEVER DOZE
HOW LIKELY ARE YOU TO NOD OFF OR FALL ASLEEP WHILE SITTING QUIETLY AFTER LUNCH WITHOUT ALCOHOL: WOULD NEVER DOZE
HOW LIKELY ARE YOU TO NOD OFF OR FALL ASLEEP WHEN YOU ARE A PASSENGER IN A CAR FOR AN HOUR WITHOUT A BREAK: WOULD NEVER DOZE

## 2024-08-04 NOTE — PROGRESS NOTES
URGENT CARE    ASSESSMENT AND PLAN:      ICD-10-CM    1. Hearing loss of right ear, unspecified hearing loss type  H91.91 [place] REMOVAL IMPACTED CERUMEN IRRIGATION/LVG UNILAT      2. Impacted cerumen of right ear  H61.21           Differentials include but not limited to acute right otitis media, cerumen impaction, vertigo, Meniere's disease, CV ischemia, autoimmune, lyme, ototoxic medications, etc.  Irrigation of right cerumen showing TM that is normal with no erythema.  No hearing obtained after irrigation.  Failed whisper test in right ear.  Via up to date initiate glucocorticoid therapy.  Prescription of Prednisone 60mg x10 days; side effects of medication discussed.  [facility] referral for follow up and evaluation.       Pt noted to have elevated blood pressure here today. Denies any headache, blurred vision, chest pain or shortness of breath.  Patient reports drinking 3 big cups of coffee before visiting urgent care today.  He checks his blood pressure at home with readings of 130/80.  Last PCP visit 8 months ago was within range as well.  Instructed to follow up with PCP for recheck of BP.  Goal blood pressure is less than 140/90.      Reviewed alarm signs needing ED evaluation.      Patient verbalized understanding and is agreeable to plan. The patient was discharged ambulatory and in stable condition.        Chief Complaint   Patient presents with    Ear Plugs     Right ear plugged onset Thursday off and on but has worsen last night.     SUBJECTIVE:  Nam May is a 63 year old male who presents with complete hearing loss in right ear noted for the past few days.  He reports traveling to [place] return a few days ago and has had loss of hearing since.  He does have a history of left-sided hearing loss due to childhood ear infections with only 60 to 70% hearing.  Right ear was unaffected until recently.      Pt has been feeling well for the past week without cough/cold symptoms, vertigo, headache, balance  problems, ear drainage, trauma/injury, or new medications.     Past Medical History:   Diagnosis Date    Colon polyp 08/2018    6 mm polyp    ED (erectile dysfunction) 2013    mild - Dr Cotto    Family history of malignant neoplasm of gastrointestinal tract     PGM - colon ca    GERD (gastroesophageal reflux disease)     Hyperlipidemia LDL goal <130 2008    Personal history of colonic polyps 12/99, 12/02, 3/05, 12/08    adenoma, tubular adenoma     Retinal tear of left eye 2/15    VRS    Seasonal allergies summer/fall    Allergic rhinitis H/O AIT - ragweed    Sleep apnea 4/11    severe - CPAP     Current Outpatient Medications   Medication Sig Dispense Refill    ASPIRIN 81 MG OR TABS 1 TABLET DAILY      MULTIVITAMIN OR None Entered      Omega-3 Krill Oil 500 MG CAPS       simvastatin (ZOCOR) 20 MG tablet Take 1 tablet (20 mg) by mouth at bedtime 90 tablet 3    azelastine (ASTELIN) 0.1 % nasal spray Spray 1 spray into both nostrils 2 times daily as needed for rhinitis or allergies (Patient not taking: Reported on 8/4/2024) 30 mL 3     No current facility-administered medications for this visit.     Social History     Tobacco Use    Smoking status: Former     Types: Cigars     Passive exposure: Never    Smokeless tobacco: Never    Tobacco comments:     Occasional cigar - maybe 12/year   Substance Use Topics    Alcohol use: Yes     Alcohol/week: 7.0 - 8.0 standard drinks of alcohol     Types: 7 - 8 Standard drinks or equivalent per week     Allergies   Allergen Reactions    Seasonal Allergies        Review of Systems  All systems reviewed and negative except per HPI.    OBJECTIVE:  BP (!) 171/91   Pulse 58   Temp 97.4  F (36.3  C) (Tympanic)   Resp 16   Wt 99.8 kg (220 lb)   SpO2 98%   BMI 29.84 kg/m       Physical Exam  GENERAL: alert and no distress  EYES: Eyes grossly normal to inspection, PERRL and conjunctivae and sclerae normal  HENT: L ear canal and TM normal.  Right ear canal with moderate cerumen  impaction with only partial visualization of TM.  Nose and mouth without ulcers or lesions  NECK: no adenopathy, no asymmetry, masses, or scars  RESP: lungs clear to auscultation - no rales, rhonchi or wheezes  CV: regular rate and rhythm, normal S1 S2, no S3 or S4, no murmur, click or rub, no peripheral edema  NEURO: Normal strength and tone, mentation intact and speech normal

## 2024-08-05 ENCOUNTER — DOCUMENTATION ONLY (OUTPATIENT)
Dept: SLEEP MEDICINE | Facility: CLINIC | Age: 64
End: 2024-08-05

## 2024-08-05 ENCOUNTER — OFFICE VISIT (OUTPATIENT)
Dept: SLEEP MEDICINE | Facility: CLINIC | Age: 64
End: 2024-08-05
Attending: PHYSICIAN ASSISTANT
Payer: COMMERCIAL

## 2024-08-05 DIAGNOSIS — G47.33 OSA (OBSTRUCTIVE SLEEP APNEA): ICD-10-CM

## 2024-08-05 PROCEDURE — G0399 HOME SLEEP TEST/TYPE 3 PORTA: HCPCS | Performed by: INTERNAL MEDICINE

## 2024-08-05 NOTE — PROGRESS NOTES
Pt is completing a home sleep test. Pt was instructed on how to put on the Noxturnal T3 device and associated equipment before going to bed and given the opportunity to practice putting it on before leaving the sleep center. Pt was reminded to bring the home sleep test kit back to the center tomorrow, at the scheduled time for download and reporting. Patient was instructed to complete study using the following treatment?  None  Neck circumference: 43 CM / 17 inches.  Lorene Hensley CMA on 8/5/2024 at 9:55 AM

## 2024-08-06 ENCOUNTER — TELEPHONE (OUTPATIENT)
Dept: OTOLARYNGOLOGY | Facility: CLINIC | Age: 64
End: 2024-08-06

## 2024-08-06 ENCOUNTER — MYC MEDICAL ADVICE (OUTPATIENT)
Dept: OTOLARYNGOLOGY | Facility: CLINIC | Age: 64
End: 2024-08-06

## 2024-08-06 ENCOUNTER — OFFICE VISIT (OUTPATIENT)
Dept: AUDIOLOGY | Facility: CLINIC | Age: 64
End: 2024-08-06
Payer: COMMERCIAL

## 2024-08-06 ENCOUNTER — DOCUMENTATION ONLY (OUTPATIENT)
Dept: SLEEP MEDICINE | Facility: CLINIC | Age: 64
End: 2024-08-06
Payer: COMMERCIAL

## 2024-08-06 DIAGNOSIS — H90.3 SENSORINEURAL HEARING LOSS, BILATERAL: Primary | ICD-10-CM

## 2024-08-06 DIAGNOSIS — H91.91 HEARING LOSS OF RIGHT EAR, UNSPECIFIED HEARING LOSS TYPE: ICD-10-CM

## 2024-08-06 PROCEDURE — 92567 TYMPANOMETRY: CPT | Performed by: AUDIOLOGIST

## 2024-08-06 PROCEDURE — 92557 COMPREHENSIVE HEARING TEST: CPT | Performed by: AUDIOLOGIST

## 2024-08-06 PROCEDURE — 92565 STENGER TEST PURE TONE: CPT | Performed by: AUDIOLOGIST

## 2024-08-06 NOTE — TELEPHONE ENCOUNTER
Left Voicemail (1st Attempt) for the patient to call back and schedule the following:    Appointment type: Return ent   Provider: Olimpia Mcdowell  Return date: on August 15, 2024 at 12:00pm  Specialty phone number: writer's direct  Additional appointment(s) needed: ENT Audio prior   Additional Notes:

## 2024-08-06 NOTE — PROGRESS NOTES
AUDIOLOGY REPORT    SUMMARY: Audiology visit completed. See audiogram for results.      RECOMMENDATIONS: Follow-up with ENT.    Ryan Zimmerman, [facility]  Licensed Audiologist  [place] #[id number]

## 2024-08-06 NOTE — PROGRESS NOTES
HST POST-STUDY QUESTIONNAIRE    What time did you go to bed?  10:30 pm  How long do you think it took to fall asleep?  10-15 mins?  What time did you wake up to start the day?  5 am  Did you get up during the night at all?  Yes  If you woke up, do you remember approximately what time(s)? 2:43 am  Did you have any difficulty with the equipment?  No  Did you us any type of treatment with this study?  None  Was the head of the bed elevated? No  Did you sleep in a recliner?  No  Did you stop using CPAP at least 3 days before this test?  Yes  Any other information you'd like us to know?

## 2024-08-06 NOTE — NURSING NOTE
Pt returned HST device. It was downloaded and forwarded data to the clinical specialist for scoring.   Lorene Hensley CMA on 8/6/2024 at 7:54 AM

## 2024-08-07 ENCOUNTER — TELEPHONE (OUTPATIENT)
Dept: OTOLARYNGOLOGY | Facility: CLINIC | Age: 64
End: 2024-08-07
Payer: COMMERCIAL

## 2024-08-07 NOTE — TELEPHONE ENCOUNTER
Left Voicemail (2nd Attempt) for the patient to call back and schedule the following:    Appointment type: ENT Audio   Provider: Any  Return date: Prior to 8/15 Olimpia walkert at 12pm     Specialty phone number: writer's direct  Additional appointment(s) needed:   Additional Notes:

## 2024-08-07 NOTE — PROGRESS NOTES
This HSAT was performed using a Noxturnal T3 device which recorded snore, sound, movement activity, body position, nasal pressure, oronasal thermal airflow, pulse, oximetry and both chest and abdominal respiratory effort. HSAT data was restricted to the time patient states they were in bed.     HSAT was scored using 1B 4% hypopnea rule.     HST AHI (Non-PAT): 2.1  Snoring was reported as moderate.  Time with SpO2 below 89% was 1.2 minutes.   Overall signal quality was good     Pt will follow up with sleep provider to determine appropriate therapy.

## 2024-08-08 ENCOUNTER — TELEPHONE (OUTPATIENT)
Dept: AUDIOLOGY | Facility: CLINIC | Age: 64
End: 2024-08-08
Payer: COMMERCIAL

## 2024-08-08 NOTE — TELEPHONE ENCOUNTER
Left Voicemail (2nd Attempt) for the patient to call back and schedule the following:    Appointment type: ENT Audio  Provider: Any  Return date: Prior to 8/15 Olimpia walkert at 12:00pm  Specialty phone number: [phone]  Additional appointment(s) needed:   Additional Notes:

## 2024-08-14 DIAGNOSIS — Z01.10 ENCOUNTER FOR HEARING EXAMINATION: Primary | ICD-10-CM

## 2024-08-15 ENCOUNTER — OFFICE VISIT (OUTPATIENT)
Dept: OTOLARYNGOLOGY | Facility: CLINIC | Age: 64
End: 2024-08-15
Payer: COMMERCIAL

## 2024-08-15 ENCOUNTER — OFFICE VISIT (OUTPATIENT)
Dept: AUDIOLOGY | Facility: CLINIC | Age: 64
End: 2024-08-15
Payer: COMMERCIAL

## 2024-08-15 VITALS
OXYGEN SATURATION: 97 % | HEIGHT: 71 IN | WEIGHT: 223 LBS | TEMPERATURE: 116.6 F | BODY MASS INDEX: 31.22 KG/M2 | HEART RATE: 47 BPM | SYSTOLIC BLOOD PRESSURE: 171 MMHG | DIASTOLIC BLOOD PRESSURE: 74 MMHG

## 2024-08-15 DIAGNOSIS — H90.3 SENSORINEURAL HEARING LOSS, BILATERAL: Primary | ICD-10-CM

## 2024-08-15 DIAGNOSIS — H91.21 SUDDEN HEARING LOSS, RIGHT: Primary | ICD-10-CM

## 2024-08-15 DIAGNOSIS — H90.3 BILATERAL SENSORINEURAL HEARING LOSS: ICD-10-CM

## 2024-08-15 PROCEDURE — 92567 TYMPANOMETRY: CPT | Mod: 52 | Performed by: AUDIOLOGIST

## 2024-08-15 PROCEDURE — 92557 COMPREHENSIVE HEARING TEST: CPT | Performed by: AUDIOLOGIST

## 2024-08-15 PROCEDURE — 99214 OFFICE O/P EST MOD 30 MIN: CPT | Performed by: REGISTERED NURSE

## 2024-08-15 ASSESSMENT — PAIN SCALES - GENERAL: PAINLEVEL: NO PAIN (0)

## 2024-08-15 NOTE — PROCEDURES
"HOME SLEEP STUDY INTERPRETATION        Patient: Nam May  MRN: [id number]  YOB: 1960  Study Date: 8/5/2024  PCP/Referring Provider: Juan C Palacio MD  Ordering Provider: Katlin Sexton PA-C         Indications for Home Study: Nam May is a 63 year old male with a history of obesity, GERD, allergic rhinitis, history of BRAXTON on CPAP.  Home sleep study was obtained to re-evaluate the status of the sleep disordered breathing and also to qualify for CPAP therapy since the previous sleep study was done long time ago.      Estimated body mass index is 31.1 kg/m  as calculated from the following:    Height as of 8/15/24: 1.803 m (5' 11\").    Weight as of 8/15/24: 101.2 kg (223 lb).  Total score - Pound: 3 (8/4/2024  9:30 AM)  STOP-BANG: 3/8      Data: A full night home sleep study was performed recording the standard physiologic parameters including body position, movement, sound, nasal pressure, thermal oral airflow, chest and abdominal movements with respiratory inductance plethysmography, and oxygen saturation by pulse oximetry. Pulse rate was estimated by oximetry recording. This study was considered adequate based on > 4 hours of quality oximetry and respiratory recording. As specified by the AASM Manual for the Scoring of Sleep and Associated events, version 2.3, Rule VIII.D 1B, 4% oxygen desaturation scoring for hypopneas is used as a standard of care on all home sleep apnea testing.      Analysis Time: 402 minutes      Respiration:   Sleep Associated Hypoxemia: sustained hypoxemia was not present.  Average oxygen saturation was 92.2%.  Time with saturation less than or equal to 89% was 1.2 minutes. The lowest oxygen saturation was 87%.   Snoring: Snoring was present.  Respiratory events: The home study revealed a presence of 1 obstructive apneas and 0 mixed and central apneas. There were 13 hypopneas resulting in a combined apnea/hypopnea index [AHI] of 2.1 events per hour.  AHI was 1.7 per " hour supine, n/a prone, 2.5 per hour on left side, and n/a on right side.   Pattern: Excluding events noted above, respiratory rate and pattern was Normal.      Position: Percent of time spent: supine -52.1%, prone -0%, on left -47.4%, on right -0%.      Heart Rate: By pulse oximetry, average pulse rate was normal at 61 bpm.  Minimum pulse rate was 50 bpm and the maximum pulse rate was 96 bpm.      Assessment:   Snoring was noted. There was no evidence of clinically significant obstructive sleep apnea based on the respiratory event index less than 5/h.    Recommendations:  Consider obtaining diagnostic polysomnography to re-evaluate for sleep disordered breathing if the clinical index of suspicion for BRAXTON is high.   It is possible that home sleep study was not sensitive enough to detect true severity of the underlying sleep disordered breathing.  Suggest optimizing sleep hygiene and avoiding sleep deprivation.  Weight management.        Diagnosis Code(s): Snoring R06.83; sleep disturbance unspecified G47.9    Electronically signed by: Don Parker MD, August 15, 2024   Diplomate, American Board of Internal Medicine, Sleep Medicine

## 2024-08-15 NOTE — PROGRESS NOTES
Otolaryngology Clinic  August 15, 2024    Chief Complaint:   Right sudden hearing loss       History of Present Illness:   Nam May is a 63 year old male who presents today for follow up after completion of steroid burst for sudden right hearing loss. Patient reports that they first started to notice hearing loss on 7/31/24. Intermittent episodes of right hearing loss for a couple of days. Woke up on 8/3/24 with significant right hearing loss that did not improve. Denies any dizziness, facial numbness/weakness, vesicles, recent cold, nasal congestion, or upper respiratory infection. Seen by urgent care and audiogram was completed demonstrating an asymmetric severe right sensorineural hearing loss across all frequencies with a 32% word recognition score. Patient has left hearing loss that is baseline since a previous noise exposure. Patient was started on a 10-day course of high dose steroids.    Today, patient reports no improvement in right hearing. Can hear tones in the right ear but everything sounds muffled/blown speaker. There is no fluctuation in hearing.     Past Medical History:  Past Medical History:   Diagnosis Date    Colon polyp 08/2018    6 mm polyp    ED (erectile dysfunction) 2013    mild - Dr Cotto    Family history of malignant neoplasm of gastrointestinal tract     PGM - colon ca    GERD (gastroesophageal reflux disease)     Hyperlipidemia LDL goal <130 2008    Personal history of colonic polyps 12/99, 12/02, 3/05, 12/08    adenoma, tubular adenoma     Retinal tear of left eye 2/15    VRS    Seasonal allergies summer/fall    Allergic rhinitis H/O AIT - ragweed    Sleep apnea 4/11    severe - CPAP       Past Surgical History:  Past Surgical History:   Procedure Laterality Date    BIOPSY  4/2021    Moles (negative)    COLONOSCOPY  12/99, 12/02, 3/05, 12/08, 3/13, 8/18    tubular adenoma - 6 mm polyp, due 5 yrs - 8/2023    COLONOSCOPY N/A 11/08/2023    negative - due 5 yrs    EYE SURGERY Left  02/2015    VRS - left eye surgery    HC REMOVE TONSILS/ADENOIDS,<11 Y/O  1964    T & A <12y.o.    ORTHOPEDIC SURGERY Right 01/2003    Right toe fusion, miniscus artho - right knee    STRESS ECHO (METRO)  10/2008    normal    SURGICAL HISTORY OF -  Right 09/2010    Rt Knee Arthroscopy - Dr Curtis    SURGICAL HISTORY OF -   03/2021    Left Calf repair       Medications:  Current Outpatient Medications   Medication Sig Dispense Refill    ASPIRIN 81 MG OR TABS 1 TABLET DAILY      azelastine (ASTELIN) 0.1 % nasal spray Spray 1 spray into both nostrils 2 times daily as needed for rhinitis or allergies (Patient not taking: Reported on 8/4/2024) 30 mL 3    MULTIVITAMIN OR None Entered      Omega-3 Krill Oil 500 MG CAPS       simvastatin (ZOCOR) 20 MG tablet Take 1 tablet (20 mg) by mouth at bedtime 90 tablet 3       Allergies:  Allergies   Allergen Reactions    Seasonal Allergies         Social History:  Social History     Tobacco Use    Smoking status: Former     Types: Cigars     Passive exposure: Never    Smokeless tobacco: Never    Tobacco comments:     Occasional cigar - maybe 12/year   Vaping Use    Vaping status: Never Used   Substance Use Topics    Alcohol use: Yes     Alcohol/week: 7.0 - 8.0 standard drinks of alcohol     Types: 7 - 8 Standard drinks or equivalent per week    Drug use: No       ROS: 10 point ROS neg other than the symptoms noted above in the HPI.    Physical Exam:    There were no vitals taken for this visit.     Constitutional:  The patient was unaccompanied, well-groomed, and in no acute distress.     Skin: Normal:  warm and pink without rash    Neurologic: Alert and oriented x 3.  CN's III-XII within normal limits.  Voice normal.    Psychiatric: The patient's affect was calm, cooperative, and appropriate.     Communication:  Normal; communicates verbally, normal voice quality.    Respiratory: Breathing comfortably without stridor or exertion of accessory muscles.    Ears: Pinnae and tragus  non-tender.  EAC's and TM's were clear.       Audiogram: 8/15/2024 - data independently reviewed  Right ear: Severe rising to moderately severe sensorineural hearing loss (stable compared to 8/6/24 audiogram)   Left ear: Normal steeply sloping to moderately severe sensorineural hearing loss   Tympanograms: type Ad bilaterally    Assessment and Plan:  1. Sudden hearing loss, right  Right sudden hearing loss without improvement with high dose steroids. Recommend MRI to rule out a retrocochlear lesion. Discussed possible etiologies of SSNHL include viral infection, vascular compromise, autoimmune disease and inner ear pathology. Explained to the patient that the cause in most patients is never identified. Recommend to continue to monitor hearing with a repeat audiogram in 6 months. Sooner for any changes, including improvements, in hearing.    Discussed amplification with patient. Recommend left hearing aid. May try a right hearing aid but, due to poor word recognition score, may not find significant benefit from traditional amplification. Can also consider a BiCROS hearing aid. Briefly discussed cochlear implant that may be an option in the future but would recommend first trialing hearing aid options. Patient agrees. Patient is medically cleared for hearing aids.      Patient will follow up in 6 months with repeat audiogram.    Olimpia Mcdowell DNP, APRN, CNP  Otolaryngology  Head & Neck Surgery  [phone]    30 minutes spent by me on the date of the encounter doing chart review, history and exam, documentation and further activities per the note

## 2024-08-15 NOTE — PROGRESS NOTES
AUDIOLOGY REPORT    SUMMARY: Audiology visit completed. See audiogram for results.      RECOMMENDATIONS: Follow-up with ENT.    Judy Hill, [facility]  Licensed Audiologist  [place] License #[id number]

## 2024-08-15 NOTE — LETTER
8/15/2024       RE: Nam May  [address] [address]     Dear Colleague,    Thank you for referring your patient, Nam May, to the [facility] [place] [facility]. Please see a copy of my visit note below.      Otolaryngology Clinic  August 15, 2024    Chief Complaint:   Right sudden hearing loss       History of Present Illness:   Nam May is a 63 year old male who presents today for follow up after completion of steroid burst for sudden right hearing loss. Patient reports that they first started to notice hearing loss on 7/31/24. Intermittent episodes of right hearing loss for a couple of days. Woke up on 8/3/24 with significant right hearing loss that did not improve. Denies any dizziness, facial numbness/weakness, vesicles, recent cold, nasal congestion, or upper respiratory infection. Seen by urgent care and audiogram was completed demonstrating an asymmetric severe right sensorineural hearing loss across all frequencies with a 32% word recognition score. Patient has left hearing loss that is baseline since a previous noise exposure. Patient was started on a 10-day course of high dose steroids.    Today, patient reports no improvement in right hearing. Can hear tones in the right ear but everything sounds muffled/blown speaker. There is no fluctuation in hearing.     Past Medical History:  Past Medical History:   Diagnosis Date     Colon polyp 08/2018    6 mm polyp     ED (erectile dysfunction) 2013    mild - Dr Cotto     Family history of malignant neoplasm of gastrointestinal tract     PGM - colon ca     GERD (gastroesophageal reflux disease)      Hyperlipidemia LDL goal <130 2008     Personal history of colonic polyps 12/99, 12/02, 3/05, 12/08    adenoma, tubular adenoma      Retinal tear of left eye 2/15    VRS     Seasonal allergies summer/fall    Allergic rhinitis H/O AIT - ragweed     Sleep  apnea 4/11    severe - CPAP       Past Surgical History:  Past Surgical History:   Procedure Laterality Date     BIOPSY  4/2021    Moles (negative)     COLONOSCOPY  12/99, 12/02, 3/05, 12/08, 3/13, 8/18    tubular adenoma - 6 mm polyp, due 5 yrs - 8/2023     COLONOSCOPY N/A 11/08/2023    negative - due 5 yrs     EYE SURGERY Left 02/2015    VRS - left eye surgery     HC REMOVE TONSILS/ADENOIDS,<11 Y/O  1964    T & A <12y.o.     ORTHOPEDIC SURGERY Right 01/2003    Right toe fusion, miniscus artho - right knee     STRESS ECHO (METRO)  10/2008    normal     SURGICAL HISTORY OF -  Right 09/2010    Rt Knee Arthroscopy - Dr Curtis     SURGICAL HISTORY OF -   03/2021    Left Calf repair       Medications:  Current Outpatient Medications   Medication Sig Dispense Refill     ASPIRIN 81 MG OR TABS 1 TABLET DAILY       azelastine (ASTELIN) 0.1 % nasal spray Spray 1 spray into both nostrils 2 times daily as needed for rhinitis or allergies (Patient not taking: Reported on 8/4/2024) 30 mL 3     MULTIVITAMIN OR None Entered       Omega-3 Krill Oil 500 MG CAPS        simvastatin (ZOCOR) 20 MG tablet Take 1 tablet (20 mg) by mouth at bedtime 90 tablet 3       Allergies:  Allergies   Allergen Reactions     Seasonal Allergies         Social History:  Social History     Tobacco Use     Smoking status: Former     Types: Cigars     Passive exposure: Never     Smokeless tobacco: Never     Tobacco comments:     Occasional cigar - maybe 12/year   Vaping Use     Vaping status: Never Used   Substance Use Topics     Alcohol use: Yes     Alcohol/week: 7.0 - 8.0 standard drinks of alcohol     Types: 7 - 8 Standard drinks or equivalent per week     Drug use: No       ROS: 10 point ROS neg other than the symptoms noted above in the HPI.    Physical Exam:    There were no vitals taken for this visit.     Constitutional:  The patient was unaccompanied, well-groomed, and in no acute distress.     Skin: Normal:  warm and pink without rash     Neurologic: Alert and oriented x 3.  CN's III-XII within normal limits.  Voice normal.    Psychiatric: The patient's affect was calm, cooperative, and appropriate.     Communication:  Normal; communicates verbally, normal voice quality.    Respiratory: Breathing comfortably without stridor or exertion of accessory muscles.    Ears: Pinnae and tragus non-tender.  EAC's and TM's were clear.       Audiogram: 8/15/2024 - data independently reviewed  Right ear: Severe rising to moderately severe sensorineural hearing loss (stable compared to 8/6/24 audiogram)   Left ear: Normal steeply sloping to moderately severe sensorineural hearing loss   Tympanograms: type Ad bilaterally    Assessment and Plan:  1. Sudden hearing loss, right  Right sudden hearing loss without improvement with high dose steroids. Recommend MRI to rule out a retrocochlear lesion. Discussed possible etiologies of SSNHL include viral infection, vascular compromise, autoimmune disease and inner ear pathology. Explained to the patient that the cause in most patients is never identified. Recommend to continue to monitor hearing with a repeat audiogram in 6 months. Sooner for any changes, including improvements, in hearing.    Discussed amplification with patient. Recommend left hearing aid. May try a right hearing aid but, due to poor word recognition score, may not find significant benefit from traditional amplification. Can also consider a BiCROS hearing aid. Briefly discussed cochlear implant that may be an option in the future but would recommend first trialing hearing aid options. Patient agrees. Patient is medically cleared for hearing aids.      Patient will follow up in 6 months with repeat audiogram.    Olimpia Mcdowell DNP, APRN, CNP  Otolaryngology  Head & Neck Surgery  [phone]    30 minutes spent by me on the date of the encounter doing chart review, history and exam, documentation and further activities per the note        Again, thank you  for allowing me to participate in the care of your patient.      Sincerely,    Claudette Mcdowell, NP

## 2024-08-15 NOTE — NURSING NOTE
"Chief Complaint   Patient presents with    RECHECK     Follow up     Blood pressure (!) 171/74, pulse (!) 47, temperature (!) 116.6  F (47  C), height 1.803 m (5' 11\"), weight 101.2 kg (223 lb), SpO2 97%.  Danny Cooper LPN    "

## 2024-08-25 PROBLEM — H90.3 ASYMMETRICAL SENSORINEURAL HEARING LOSS: Status: ACTIVE | Noted: 2024-07-01

## 2024-09-13 ENCOUNTER — ANCILLARY PROCEDURE (OUTPATIENT)
Dept: MRI IMAGING | Facility: CLINIC | Age: 64
End: 2024-09-13
Attending: REGISTERED NURSE
Payer: COMMERCIAL

## 2024-09-13 DIAGNOSIS — H91.21 SUDDEN HEARING LOSS, RIGHT: ICD-10-CM

## 2024-09-13 PROCEDURE — A9585 GADOBUTROL INJECTION: HCPCS | Mod: JZ | Performed by: RADIOLOGY

## 2024-09-13 PROCEDURE — 70553 MRI BRAIN STEM W/O & W/DYE: CPT | Mod: GC | Performed by: RADIOLOGY

## 2024-09-13 RX ORDER — GADOBUTROL 604.72 MG/ML
10 INJECTION INTRAVENOUS ONCE
Status: COMPLETED | OUTPATIENT
Start: 2024-09-13 | End: 2024-09-13

## 2024-09-13 RX ADMIN — GADOBUTROL 10 ML: 604.72 INJECTION INTRAVENOUS at 18:45

## 2024-09-16 ENCOUNTER — TRANSFERRED RECORDS (OUTPATIENT)
Dept: HEALTH INFORMATION MANAGEMENT | Facility: CLINIC | Age: 64
End: 2024-09-16
Payer: COMMERCIAL

## 2024-11-23 DIAGNOSIS — E78.5 HYPERLIPIDEMIA LDL GOAL <130: ICD-10-CM

## 2024-11-25 RX ORDER — SIMVASTATIN 20 MG
20 TABLET ORAL AT BEDTIME
Qty: 90 TABLET | Refills: 0 | Status: SHIPPED | OUTPATIENT
Start: 2024-11-25

## 2024-12-13 SDOH — HEALTH STABILITY: PHYSICAL HEALTH: ON AVERAGE, HOW MANY DAYS PER WEEK DO YOU ENGAGE IN MODERATE TO STRENUOUS EXERCISE (LIKE A BRISK WALK)?: 5 DAYS

## 2024-12-13 SDOH — HEALTH STABILITY: PHYSICAL HEALTH: ON AVERAGE, HOW MANY MINUTES DO YOU ENGAGE IN EXERCISE AT THIS LEVEL?: 30 MIN

## 2024-12-13 ASSESSMENT — SOCIAL DETERMINANTS OF HEALTH (SDOH): HOW OFTEN DO YOU GET TOGETHER WITH FRIENDS OR RELATIVES?: TWICE A WEEK

## 2024-12-16 ENCOUNTER — OFFICE VISIT (OUTPATIENT)
Dept: FAMILY MEDICINE | Facility: CLINIC | Age: 64
End: 2024-12-16
Attending: FAMILY MEDICINE
Payer: COMMERCIAL

## 2024-12-16 VITALS
HEART RATE: 79 BPM | OXYGEN SATURATION: 98 % | RESPIRATION RATE: 18 BRPM | SYSTOLIC BLOOD PRESSURE: 138 MMHG | TEMPERATURE: 97.3 F | BODY MASS INDEX: 31.92 KG/M2 | HEIGHT: 71 IN | WEIGHT: 228 LBS | DIASTOLIC BLOOD PRESSURE: 80 MMHG

## 2024-12-16 DIAGNOSIS — G47.33 OBSTRUCTIVE SLEEP APNEA SYNDROME: ICD-10-CM

## 2024-12-16 DIAGNOSIS — D12.6 TUBULAR ADENOMA OF COLON: ICD-10-CM

## 2024-12-16 DIAGNOSIS — Z12.5 SCREENING FOR PROSTATE CANCER: ICD-10-CM

## 2024-12-16 DIAGNOSIS — N52.9 ERECTILE DYSFUNCTION, UNSPECIFIED ERECTILE DYSFUNCTION TYPE: ICD-10-CM

## 2024-12-16 DIAGNOSIS — Z00.00 ROUTINE GENERAL MEDICAL EXAMINATION AT A HEALTH CARE FACILITY: Primary | ICD-10-CM

## 2024-12-16 DIAGNOSIS — E78.5 HYPERLIPIDEMIA LDL GOAL <130: ICD-10-CM

## 2024-12-16 DIAGNOSIS — H90.3 ASYMMETRICAL SENSORINEURAL HEARING LOSS: ICD-10-CM

## 2024-12-16 DIAGNOSIS — Z86.0100 HISTORY OF COLONIC POLYPS: ICD-10-CM

## 2024-12-16 DIAGNOSIS — Z23 NEED FOR COVID-19 VACCINE: ICD-10-CM

## 2024-12-16 DIAGNOSIS — Z23 NEED FOR INFLUENZA VACCINATION: ICD-10-CM

## 2024-12-16 DIAGNOSIS — K21.9 GASTROESOPHAGEAL REFLUX DISEASE WITHOUT ESOPHAGITIS: ICD-10-CM

## 2024-12-16 DIAGNOSIS — Z91.09 ENVIRONMENTAL ALLERGIES: ICD-10-CM

## 2024-12-16 DIAGNOSIS — Z12.11 SCREEN FOR COLON CANCER: ICD-10-CM

## 2024-12-16 DIAGNOSIS — Z23 NEED FOR PNEUMOCOCCAL VACCINATION: ICD-10-CM

## 2024-12-16 DIAGNOSIS — Z51.81 MEDICATION MONITORING ENCOUNTER: ICD-10-CM

## 2024-12-16 DIAGNOSIS — Z82.49 FAMILY HISTORY OF ISCHEMIC HEART DISEASE AND OTHER DISEASES OF THE CIRCULATORY SYSTEM: ICD-10-CM

## 2024-12-16 DIAGNOSIS — E66.811 CLASS 1 OBESITY WITH SERIOUS COMORBIDITY AND BODY MASS INDEX (BMI) OF 31.0 TO 31.9 IN ADULT, UNSPECIFIED OBESITY TYPE: ICD-10-CM

## 2024-12-16 LAB
ALBUMIN SERPL BCG-MCNC: 4.5 G/DL (ref 3.5–5.2)
ALBUMIN UR-MCNC: NEGATIVE MG/DL
ALP SERPL-CCNC: 70 U/L (ref 40–150)
ALT SERPL W P-5'-P-CCNC: 29 U/L (ref 0–70)
ANION GAP SERPL CALCULATED.3IONS-SCNC: 9 MMOL/L (ref 7–15)
APPEARANCE UR: CLEAR
AST SERPL W P-5'-P-CCNC: 31 U/L (ref 0–45)
BILIRUB SERPL-MCNC: 0.9 MG/DL
BILIRUB UR QL STRIP: NEGATIVE
BUN SERPL-MCNC: 20.2 MG/DL (ref 8–23)
CALCIUM SERPL-MCNC: 9.5 MG/DL (ref 8.8–10.4)
CHLORIDE SERPL-SCNC: 103 MMOL/L (ref 98–107)
CHOLEST SERPL-MCNC: 200 MG/DL
CK SERPL-CCNC: 115 U/L (ref 39–308)
COLOR UR AUTO: YELLOW
CREAT SERPL-MCNC: 1.05 MG/DL (ref 0.67–1.17)
EGFRCR SERPLBLD CKD-EPI 2021: 79 ML/MIN/1.73M2
ERYTHROCYTE [DISTWIDTH] IN BLOOD BY AUTOMATED COUNT: 12.3 % (ref 10–15)
FASTING STATUS PATIENT QL REPORTED: YES
FASTING STATUS PATIENT QL REPORTED: YES
GLUCOSE SERPL-MCNC: 81 MG/DL (ref 70–99)
GLUCOSE UR STRIP-MCNC: NEGATIVE MG/DL
HCO3 SERPL-SCNC: 29 MMOL/L (ref 22–29)
HCT VFR BLD AUTO: 44.4 % (ref 40–53)
HDLC SERPL-MCNC: 55 MG/DL
HGB BLD-MCNC: 15.2 G/DL (ref 13.3–17.7)
HGB UR QL STRIP: NEGATIVE
KETONES UR STRIP-MCNC: NEGATIVE MG/DL
LDLC SERPL CALC-MCNC: 118 MG/DL
LEUKOCYTE ESTERASE UR QL STRIP: NEGATIVE
MCH RBC QN AUTO: 31.1 PG (ref 26.5–33)
MCHC RBC AUTO-ENTMCNC: 34.2 G/DL (ref 31.5–36.5)
MCV RBC AUTO: 91 FL (ref 78–100)
NITRATE UR QL: NEGATIVE
NONHDLC SERPL-MCNC: 145 MG/DL
PH UR STRIP: 7 [PH] (ref 5–7)
PLATELET # BLD AUTO: 259 10E3/UL (ref 150–450)
POTASSIUM SERPL-SCNC: 4.6 MMOL/L (ref 3.4–5.3)
PROT SERPL-MCNC: 7 G/DL (ref 6.4–8.3)
PSA SERPL DL<=0.01 NG/ML-MCNC: 1.74 NG/ML (ref 0–4.5)
RBC # BLD AUTO: 4.88 10E6/UL (ref 4.4–5.9)
SODIUM SERPL-SCNC: 141 MMOL/L (ref 135–145)
SP GR UR STRIP: 1.02 (ref 1–1.03)
TRIGL SERPL-MCNC: 134 MG/DL
TSH SERPL DL<=0.005 MIU/L-ACNC: 2.04 UIU/ML (ref 0.3–4.2)
UROBILINOGEN UR STRIP-ACNC: 0.2 E.U./DL
WBC # BLD AUTO: 7.5 10E3/UL (ref 4–11)

## 2024-12-16 PROCEDURE — G0103 PSA SCREENING: HCPCS | Performed by: FAMILY MEDICINE

## 2024-12-16 PROCEDURE — 36415 COLL VENOUS BLD VENIPUNCTURE: CPT | Performed by: FAMILY MEDICINE

## 2024-12-16 PROCEDURE — 91320 SARSCV2 VAC 30MCG TRS-SUC IM: CPT | Performed by: FAMILY MEDICINE

## 2024-12-16 PROCEDURE — 90472 IMMUNIZATION ADMIN EACH ADD: CPT | Performed by: FAMILY MEDICINE

## 2024-12-16 PROCEDURE — 84443 ASSAY THYROID STIM HORMONE: CPT | Performed by: FAMILY MEDICINE

## 2024-12-16 PROCEDURE — 85027 COMPLETE CBC AUTOMATED: CPT | Performed by: FAMILY MEDICINE

## 2024-12-16 PROCEDURE — 82043 UR ALBUMIN QUANTITATIVE: CPT | Performed by: FAMILY MEDICINE

## 2024-12-16 PROCEDURE — 80053 COMPREHEN METABOLIC PANEL: CPT | Performed by: FAMILY MEDICINE

## 2024-12-16 PROCEDURE — 90673 RIV3 VACCINE NO PRESERV IM: CPT | Performed by: FAMILY MEDICINE

## 2024-12-16 PROCEDURE — 90677 PCV20 VACCINE IM: CPT | Performed by: FAMILY MEDICINE

## 2024-12-16 PROCEDURE — 80061 LIPID PANEL: CPT | Performed by: FAMILY MEDICINE

## 2024-12-16 PROCEDURE — 82570 ASSAY OF URINE CREATININE: CPT | Performed by: FAMILY MEDICINE

## 2024-12-16 PROCEDURE — 81003 URINALYSIS AUTO W/O SCOPE: CPT | Performed by: FAMILY MEDICINE

## 2024-12-16 PROCEDURE — 99396 PREV VISIT EST AGE 40-64: CPT | Mod: 25 | Performed by: FAMILY MEDICINE

## 2024-12-16 PROCEDURE — 99213 OFFICE O/P EST LOW 20 MIN: CPT | Mod: 25 | Performed by: FAMILY MEDICINE

## 2024-12-16 PROCEDURE — 90480 ADMN SARSCOV2 VAC 1/ONLY CMP: CPT | Performed by: FAMILY MEDICINE

## 2024-12-16 PROCEDURE — 90471 IMMUNIZATION ADMIN: CPT | Performed by: FAMILY MEDICINE

## 2024-12-16 PROCEDURE — 82550 ASSAY OF CK (CPK): CPT | Performed by: FAMILY MEDICINE

## 2024-12-16 RX ORDER — SIMVASTATIN 20 MG
20 TABLET ORAL AT BEDTIME
Qty: 90 TABLET | Refills: 3 | Status: SHIPPED | OUTPATIENT
Start: 2024-12-16

## 2024-12-16 NOTE — PROGRESS NOTES
Preventive Care Visit  [facility] [place]  Juan C Palacio MD, Family Medicine  Dec 16, 2024      Assessment & Plan     Routine general medical examination at a health care facility    - Comprehensive metabolic panel  - Lipid panel reflex to direct LDL Fasting  - CBC with platelets  - CK total  - UA Macroscopic with reflex to Microscopic and Culture  - Albumin Random Urine Quantitative with Creat Ratio  - TSH with free T4 reflex  - Prostate Specific Antigen Screen  - Fecal colorectal cancer screen FIT  - PRIMARY CARE FOLLOW-UP SCHEDULING  - REVIEW OF HEALTH MAINTENANCE PROTOCOL ORDERS  - Comprehensive metabolic panel  - Lipid panel reflex to direct LDL Fasting  - CBC with platelets  - CK total  - UA Macroscopic with reflex to Microscopic and Culture  - Albumin Random Urine Quantitative with Creat Ratio  - TSH with free T4 reflex  - Prostate Specific Antigen Screen  - Fecal colorectal cancer screen FIT  - PRIMARY CARE FOLLOW-UP SCHEDULING    Asymmetrical sensorineural hearing loss    - PRIMARY CARE FOLLOW-UP SCHEDULING  - REVIEW OF HEALTH MAINTENANCE PROTOCOL ORDERS  - PRIMARY CARE FOLLOW-UP SCHEDULING  - OFFICE/OUTPT VISIT,EST,LEVL IV    Hyperlipidemia LDL goal <130    - Comprehensive metabolic panel  - Lipid panel reflex to direct LDL Fasting  - CK total  - PRIMARY CARE FOLLOW-UP SCHEDULING  - REVIEW OF HEALTH MAINTENANCE PROTOCOL ORDERS  - Comprehensive metabolic panel  - Lipid panel reflex to direct LDL Fasting  - CK total  - CT Coronary Calcium Scan  - simvastatin (ZOCOR) 20 MG tablet  Dispense: 90 tablet; Refill: 3  - PRIMARY CARE FOLLOW-UP SCHEDULING  - OFFICE/OUTPT VISIT,EST,LEVL IV    Obstructive sleep apnea syndrome    - TSH with free T4 reflex  - PRIMARY CARE FOLLOW-UP SCHEDULING  - REVIEW OF HEALTH MAINTENANCE PROTOCOL ORDERS  - TSH with free T4 reflex  - CT Coronary Calcium Scan  - PRIMARY CARE FOLLOW-UP SCHEDULING  - OFFICE/OUTPT VISIT,EST,LEVL IV    Gastroesophageal reflux disease without  esophagitis    - CBC with platelets  - PRIMARY CARE FOLLOW-UP SCHEDULING  - REVIEW OF HEALTH MAINTENANCE PROTOCOL ORDERS  - CBC with platelets  - PRIMARY CARE FOLLOW-UP SCHEDULING  - OFFICE/OUTPT VISIT,EST,LEVL IV    Environmental allergies    - PRIMARY CARE FOLLOW-UP SCHEDULING  - REVIEW OF HEALTH MAINTENANCE PROTOCOL ORDERS  - PRIMARY CARE FOLLOW-UP SCHEDULING  - OFFICE/OUTPT VISIT,EST,LEVL IV    Erectile dysfunction, unspecified erectile dysfunction type    - PRIMARY CARE FOLLOW-UP SCHEDULING  - REVIEW OF HEALTH MAINTENANCE PROTOCOL ORDERS  - CT Coronary Calcium Scan  - PRIMARY CARE FOLLOW-UP SCHEDULING  - OFFICE/OUTPT VISIT,EST,LEVL IV    Family history of ischemic heart disease and other diseases of the circulatory system    - Lipid panel reflex to direct LDL Fasting  - PRIMARY CARE FOLLOW-UP SCHEDULING  - REVIEW OF HEALTH MAINTENANCE PROTOCOL ORDERS  - Lipid panel reflex to direct LDL Fasting  - CT Coronary Calcium Scan  - PRIMARY CARE FOLLOW-UP SCHEDULING  - OFFICE/OUTPT VISIT,EST,LEVL IV    History of colonic polyps    - Fecal colorectal cancer screen FIT  - PRIMARY CARE FOLLOW-UP SCHEDULING  - REVIEW OF HEALTH MAINTENANCE PROTOCOL ORDERS  - Fecal colorectal cancer screen FIT  - PRIMARY CARE FOLLOW-UP SCHEDULING  - OFFICE/OUTPT VISIT,EST,LEVL IV    Tubular adenoma of colon- repeat colonoscopy in 2023 ( 5 years)     - Fecal colorectal cancer screen FIT  - PRIMARY CARE FOLLOW-UP SCHEDULING  - REVIEW OF HEALTH MAINTENANCE PROTOCOL ORDERS  - Fecal colorectal cancer screen FIT  - PRIMARY CARE FOLLOW-UP SCHEDULING  - OFFICE/OUTPT VISIT,EST,LEVL IV    Screen for colon cancer    - Fecal colorectal cancer screen FIT  - PRIMARY CARE FOLLOW-UP SCHEDULING  - REVIEW OF HEALTH MAINTENANCE PROTOCOL ORDERS  - Fecal colorectal cancer screen FIT  - PRIMARY CARE FOLLOW-UP SCHEDULING  - OFFICE/OUTPT VISIT,EST,LEVL IV    Screening for prostate cancer    - Prostate Specific Antigen Screen  - PRIMARY CARE FOLLOW-UP SCHEDULING  -  REVIEW OF HEALTH MAINTENANCE PROTOCOL ORDERS  - Prostate Specific Antigen Screen  - PRIMARY CARE FOLLOW-UP SCHEDULING  - OFFICE/OUTPT VISIT,EST,LEVL IV    Class 1 obesity with serious comorbidity and body mass index (BMI) of 31.0 to 31.9 in adult, unspecified obesity type    - PRIMARY CARE FOLLOW-UP SCHEDULING  - REVIEW OF HEALTH MAINTENANCE PROTOCOL ORDERS  - PRIMARY CARE FOLLOW-UP SCHEDULING  - OFFICE/OUTPT VISIT,EST,LEVL IV    Medication monitoring encounter    - Comprehensive metabolic panel  - Lipid panel reflex to direct LDL Fasting  - CBC with platelets  - CK total  - UA Macroscopic with reflex to Microscopic and Culture  - Albumin Random Urine Quantitative with Creat Ratio  - TSH with free T4 reflex  - Prostate Specific Antigen Screen  - Fecal colorectal cancer screen FIT  - PRIMARY CARE FOLLOW-UP SCHEDULING  - REVIEW OF HEALTH MAINTENANCE PROTOCOL ORDERS  - Comprehensive metabolic panel  - Lipid panel reflex to direct LDL Fasting  - CBC with platelets  - CK total  - UA Macroscopic with reflex to Microscopic and Culture  - Albumin Random Urine Quantitative with Creat Ratio  - TSH with free T4 reflex  - Prostate Specific Antigen Screen  - Fecal colorectal cancer screen FIT  - PRIMARY CARE FOLLOW-UP SCHEDULING  - OFFICE/OUTPT VISIT,EST,LEVL IV    Need for COVID-19 vaccine    - PRIMARY CARE FOLLOW-UP SCHEDULING  - COVID-19 12+ (PFIZER)  - REVIEW OF HEALTH MAINTENANCE PROTOCOL ORDERS  - PRIMARY CARE FOLLOW-UP SCHEDULING  - OFFICE/OUTPT VISIT,EST,LEVL IV    Need for influenza vaccination    - PRIMARY CARE FOLLOW-UP SCHEDULING  - INFLUENZA VACCINE TRIVALENT(FLUBLOK)  - REVIEW OF HEALTH MAINTENANCE PROTOCOL ORDERS  - PRIMARY CARE FOLLOW-UP SCHEDULING  - OFFICE/OUTPT VISIT,EST,LEVL IV    Need for pneumococcal vaccination    - PRIMARY CARE FOLLOW-UP SCHEDULING  - REVIEW OF HEALTH MAINTENANCE PROTOCOL ORDERS  - PNEUMOCOCCAL 20 VALENT CONJUGATE (PREVNAR 20)  - PRIMARY CARE FOLLOW-UP SCHEDULING  - OFFICE/OUTPT  "VISIT,EST,LEVL IV      Patient has been advised of split billing requirements and indicates understanding: Yes    BMI  Estimated body mass index is 31.8 kg/m  as calculated from the following:    Height as of this encounter: 1.803 m (5' 11\").    Weight as of this encounter: 103.4 kg (228 lb).   Weight management plan: diet and exercise    Counseling  Appropriate preventive services were addressed with this patient via screening, questionnaire, or discussion as appropriate for fall prevention, nutrition, physical activity, Tobacco-use cessation, social engagement, weight loss and cognition.  Checklist reviewing preventive services available has been given to the patient.  Reviewed patient's diet, addressing concerns and/or questions.     Work on weight loss  Regular exercise    Plan:    1) Medications: reviewed, refilled    2) Labs: pending    3) Immunizations: covid, flublok, prevnar 20    4) Imaging/Diagnostics: NA    5) Consults: NA    Return in about 1 year (around 12/16/2025) for Complete Physical, Medication Recheck Visit, Follow Up Chronic.    45 minutes spent by myself on the date of the encounter doing chart review, history and exam, documentation and further activities per the note.    The longitudinal plan of care for the diagnosis(es)/condition(s) as documented were addressed during this visit. Due to the added complexity in care, I will continue to support Pat in the subsequent management and with ongoing continuity of care.    Angy Browning is a 64 year old, presenting for the following:  Physical        12/16/2024     2:04 PM   Additional Questions   Roomed by Rosalinda BRADY      Max weight 238 lbs - increased exercise / working out with weights    Wt Readings from Last 4 Encounters:   12/16/24 103.4 kg (228 lb)   08/15/24 101.2 kg (223 lb)   08/04/24 99.8 kg (220 lb)   05/14/24 99.8 kg (220 lb)     Hyperlipidemia Follow-Up    Are you regularly taking any medication or supplement to lower your " cholesterol?   Yes- Simvastatin  Are you having muscle aches or other side effects that you think could be caused by your cholesterol lowering medication?  No    Recent Labs   Lab Test 12/07/23  0729 12/07/22  1126   CHOL 174 160   HDL 50 51   * 95   TRIG 100 72     BRAXTON - recent test - no treatment needed    GERD - no issues    Environmental allergies - doing well now, tough in late summer    ED - doing well    Hx of colon polyps - last 11/2023, due 5 yrs    Last Rt hearing [facility] -  ENT / Dr Nelson    Health Care Directive  Patient does not have a Health Care Directive: Patient states has Advance Directive and will bring in a copy to clinic.        12/13/2024   General Health   How would you rate your overall physical health? Excellent   Feel stress (tense, anxious, or unable to sleep) Not at all            12/13/2024   Nutrition   Three or more servings of calcium each day? Yes   Diet: Regular (no restrictions)   How many servings of fruit and vegetables per day? 4 or more   How many sweetened beverages each day? 0-1            12/13/2024   Exercise   Days per week of moderate/strenous exercise 5 days   Average minutes spent exercising at this level 30 min            12/13/2024   Social Factors   Frequency of gathering with friends or relatives Twice a week   Worry food won't last until get money to buy more No   Food not last or not have enough money for food? No   Do you have housing? (Housing is defined as stable permanent housing and does not include staying ouside in a car, in a tent, in an abandoned building, in an overnight shelter, or couch-surfing.) Yes   Are you worried about losing your housing? No   Lack of transportation? No   Unable to get utilities (heat,electricity)? No            12/13/2024   Fall Risk   Fallen 2 or more times in the past year? No    Trouble with walking or balance? No        Patient-reported          12/13/2024   Dental   Dentist two times every year? Yes             2024   TB Screening   Were you born outside of the [place]? No            Today's PHQ-2 Score:       8/15/2024    12:05 PM   PHQ-2 (  [employer])   Q1: Little interest or pleasure in doing things 0   Q2: Feeling down, depressed or hopeless 0   PHQ-2 Score 0         2024   Substance Use   Alcohol more than 3/day or more than 7/wk No   Do you use any other substances recreationally? No        Social History     Tobacco Use    Smoking status: Former     Types: Cigars     Passive exposure: Never    Smokeless tobacco: Never    Tobacco comments:     Occasional cigar - maybe 12/year   Vaping Use    Vaping status: Never Used   Substance Use Topics    Alcohol use: Yes     Alcohol/week: 7.0 - 8.0 standard drinks of alcohol     Types: 7 - 8 Standard drinks or equivalent per week     Comment: avg 0-12 beers per week    Drug use: No           2024   STI Screening   New sexual partner(s) since last STI/HIV test? No      Last PSA:   PSA   Date Value Ref Range Status   2020 1.43 0 - 4 ug/L Final     Comment:     Assay Method:  Chemiluminescence using Siemens Vista analyzer     Prostate Specific Antigen Screen   Date Value Ref Range Status   2024 1.74 0.00 - 4.50 ng/mL Final   2021 2.10 0.00 - 4.00 ug/L Final     ASCVD Risk   The 10-year ASCVD risk score (Barbara BECERRA, et al., 2019) is: 12.6%    Values used to calculate the score:      Age: 64 years      Sex: Male      Is Non- : No      Diabetic: No      Tobacco smoker: No      Systolic Blood Pressure: 138 mmHg      Is BP treated: No      HDL Cholesterol: 55 mg/dL      Total Cholesterol: 200 mg/dL    Fracture Risk Assessment Tool  Link to Frax Calculator  Use the information below to complete the Frax calculator  : 1960  Sex: male  Weight (kg): 103.4 kg (actual weight)  Height (cm): 180.3 cm  Previous Fragility Fracture:  No  History of parent with fractured hip:  No  Current Smoking:  No  Patient has been on  glucocorticoids for more than 3 months (5mg/day or more): No  Rheumatoid Arthritis on Problem List:  No  Secondary Osteoporosis on Problem List:  No  Consumes 3 or more units of alcohol per day: No  Femoral Neck BMD (g/cm2)           Reviewed and updated as needed this visit by Provider                  Patient Active Problem List   Diagnosis    History of colonic polyps    GERD (gastroesophageal reflux disease)    Hyperlipidemia LDL goal <130    Seasonal allergies    Sleep apnea    ED (erectile dysfunction)    Retinal tear of left eye    Right knee pain    Hip pain, right    Tubular adenoma of colon- repeat colonoscopy in 2023 ( 5 years)     Class 1 obesity with serious comorbidity and body mass index (BMI) of 31.0 to 31.9 in adult, unspecified obesity type    Environmental allergies    Family history of ischemic heart disease and other diseases of the circulatory system    Asymmetrical sensorineural hearing loss       Past Medical History:   Diagnosis Date    Asymmetrical sensorineural hearing loss 07/2024    Right    Colon polyp 08/2018    6 mm polyp    ED (erectile dysfunction) 2013    mild - Dr Cotto    Family history of malignant neoplasm of gastrointestinal tract     PGM - colon ca    GERD (gastroesophageal reflux disease)     Hyperlipidemia LDL goal <130 2008    Personal history of colonic polyps 12/99, 12/02, 3/05, 12/08    adenoma, tubular adenoma     Retinal tear of left eye 02/2015    VRS    Seasonal allergies summer/fall    Allergic rhinitis H/O AIT - ragweed    Sleep apnea 04/2011    severe - CPAP - recehck 2024 - no further tx needed       Past Surgical History:   Procedure Laterality Date    BIOPSY  4/2021    Moles (negative)    COLONOSCOPY  12/99, 12/02, 3/05, 12/08, 3/13, 8/18    tubular adenoma - 6 mm polyp, due 5 yrs - 8/2023    COLONOSCOPY N/A 11/08/2023    negative - due 5 yrs    EYE SURGERY Left 02/2015    VRS - left eye surgery    HC REMOVE TONSILS/ADENOIDS,<11 Y/O  1964    T & A <12y.o.     ORTHOPEDIC SURGERY Right 2003    Right toe fusion, miniscus artho - right knee    STRESS ECHO (METRO)  10/2008    normal    SURGICAL HISTORY OF -  Right 2010    Rt Knee Arthroscopy - Dr Curtis    SURGICAL HISTORY OF -   2021    Left Calf repair       Current Outpatient Medications   Medication Sig Dispense Refill    ASPIRIN 81 MG OR TABS 1 TABLET DAILY      MULTIVITAMIN OR None Entered      Omega-3 Krill Oil 500 MG CAPS       simvastatin (ZOCOR) 20 MG tablet Take 1 tablet (20 mg) by mouth at bedtime. 90 tablet 3    azelastine (ASTELIN) 0.1 % nasal spray Spray 1 spray into both nostrils 2 times daily as needed for rhinitis or allergies 30 mL 3       Allergies   Allergen Reactions    Seasonal Allergies        Family History   Problem Relation Age of Onset    Obesity Mother     Hypertension Mother     Snoring Mother     Sleep Apnea Mother     Cancer Father         lymphoma - stage 1E - ? aggressive type -     Alzheimer Disease Father     Cerebrovascular Disease Father          2016    Snoring Father     Osteoarthritis Brother         bilateral knee replacement    Cerebrovascular Disease Maternal Grandfather         Maternal grandfather    C.A.D. Maternal Grandfather     Cancer - colorectal Paternal Grandmother         40's    Colon Cancer Paternal Grandmother         Colon surgery after WWII    C.A.D. Paternal Grandfather          at age 56    Other - See Comments Daughter         adopted    Other - See Comments Daughter         adopted       Social History     Socioeconomic History    Marital status:      Spouse name: Arabella    Number of children: 2    Years of education: 18    Highest education level: None   Occupational History     Employer: [employer]   Tobacco Use    Smoking status: Former     Types: Cigars     Passive exposure: Never    Smokeless tobacco: Never    Tobacco comments:     Occasional cigar - maybe 12/year   Vaping Use    Vaping status: Never Used   Substance  and Sexual Activity    Alcohol use: Yes     Alcohol/week: 7.0 - 8.0 standard drinks of alcohol     Types: 7 - 8 Standard drinks or equivalent per week     Comment: avg 0-12 beers per week    Drug use: No    Sexual activity: Yes     Partners: Female     Birth control/protection: None   Other Topics Concern    Caffeine Concern Yes     Comment: 2 cups qd    Exercise Yes     Comment: 5-6 days per week    Seat Belt Yes    Parent/sibling w/ CABG, MI or angioplasty before 65F 55M? No     Social Drivers of Health     Financial Resource Strain: Low Risk  (12/13/2024)    Financial Resource Strain     Within the past 12 months, have you or your family members you live with been unable to get utilities (heat, electricity) when it was really needed?: No   Food Insecurity: Low Risk  (12/13/2024)    Food Insecurity     Within the past 12 months, did you worry that your food would run out before you got money to buy more?: No     Within the past 12 months, did the food you bought just not last and you didn t have money to get more?: No   Transportation Needs: Low Risk  (12/13/2024)    Transportation Needs     Within the past 12 months, has lack of transportation kept you from medical appointments, getting your medicines, non-medical meetings or appointments, work, or from getting things that you need?: No   Physical Activity: Sufficiently Active (12/13/2024)    Exercise Vital Sign     Days of Exercise per Week: 5 days     Minutes of Exercise per Session: 30 min   Stress: No Stress Concern Present (12/13/2024)    [employer] - Occupational Stress Questionnaire     Feeling of Stress : Not at all   Social Connections: Unknown (12/13/2024)    Social Connection and Isolation Panel [NHANES]     Frequency of Social Gatherings with Friends and Family: Twice a week   Interpersonal Safety: Low Risk  (12/16/2024)    Interpersonal Safety     Do you feel physically and emotionally safe where you currently live?: Yes  "    Within the past 12 months, have you been hit, slapped, kicked or otherwise physically hurt by someone?: No     Within the past 12 months, have you been humiliated or emotionally abused in other ways by your partner or ex-partner?: No   Housing Stability: Low Risk  (12/13/2024)    Housing Stability     Do you have housing? : Yes     Are you worried about losing your housing?: No     Colonoscopy:  due 11/2028  FIT / Cologuard: ordered  PSA: pending      Review of Systems  CONSTITUTIONAL: NEGATIVE for fever, chills, change in weight  INTEGUMENTARY/SKIN: NEGATIVE for worrisome rashes, moles or lesions  EYES: NEGATIVE for vision changes or irritation  ENT/MOUTH: NEGATIVE for ear, mouth and throat problems  RESP: NEGATIVE for significant cough or SOB  CV: NEGATIVE for chest pain, palpitations or peripheral edema  GI: NEGATIVE for nausea, abdominal pain, heartburn, or change in bowel habits  : NEGATIVE for frequency, dysuria, or hematuria  MUSCULOSKELETAL: NEGATIVE for significant arthralgias or myalgia  NEURO: NEGATIVE for weakness, dizziness or paresthesias  ENDOCRINE: NEGATIVE for temperature intolerance, skin/hair changes  HEME: NEGATIVE for bleeding problems  PSYCHIATRIC: NEGATIVE for changes in mood or affect     Objective    Exam  /80   Pulse 79   Temp 97.3  F (36.3  C) (Tympanic)   Resp 18   Ht 1.803 m (5' 11\")   Wt 103.4 kg (228 lb)   SpO2 98%   BMI 31.80 kg/m     Estimated body mass index is 31.8 kg/m  as calculated from the following:    Height as of this encounter: 1.803 m (5' 11\").    Weight as of this encounter: 103.4 kg (228 lb).    Physical Exam  GENERAL: alert and no distress  EYES: Eyes grossly normal to inspection, PERRL and conjunctivae and sclerae normal  HENT: ear canals and TM's normal, nose and mouth without ulcers or lesions  NECK: no adenopathy, no asymmetry, masses, or scars  RESP: lungs clear to auscultation - no rales, rhonchi or wheezes  CV: regular rate and rhythm, normal " S1 S2, no S3 or S4, no murmur, click or rub, no peripheral edema  ABDOMEN: soft, nontender, no hepatosplenomegaly, no masses and bowel sounds normal   (male): testicles normal without atrophy or masses, no hernias, and penis normal without urethral discharge  RECTAL: normal sphincter tone, no rectal masses and prostate of normal size for age, smooth, nontender without masses/nodules  MS: no gross musculoskeletal defects noted, no edema  SKIN: no suspicious lesions or rashes  NEURO: Normal strength and tone, mentation intact and speech normal  PSYCH: mentation appears normal, affect normal/bright    Signed Electronically by:            Juan C Palacio MD, FAAFP     [facility]  [address] [place] [id number]  [email or web address]   Office: [phone]  Fax: [phone]

## 2024-12-17 LAB
CREAT UR-MCNC: 199 MG/DL
MICROALBUMIN UR-MCNC: <12 MG/L
MICROALBUMIN/CREAT UR: NORMAL MG/G{CREAT}

## 2025-02-10 ENCOUNTER — OFFICE VISIT (OUTPATIENT)
Dept: AUDIOLOGY | Facility: CLINIC | Age: 65
End: 2025-02-10
Payer: COMMERCIAL

## 2025-02-10 DIAGNOSIS — H90.3 SENSORINEURAL HEARING LOSS, BILATERAL: Primary | ICD-10-CM

## 2025-02-10 DIAGNOSIS — Z01.10 ENCOUNTER FOR HEARING EXAMINATION: ICD-10-CM

## 2025-02-10 NOTE — PROGRESS NOTES
AUDIOLOGY REPORT    SUMMARY: Audiology visit completed. See audiogram for results.      RECOMMENDATIONS: Follow-up with ENT. May consider trial of hearing aids/BiCROS if so desired.    Ryan Zimmerman, [facility]  Licensed Audiologist  [place] #[id number]

## 2025-05-30 ENCOUNTER — TRANSFERRED RECORDS (OUTPATIENT)
Dept: HEALTH INFORMATION MANAGEMENT | Facility: CLINIC | Age: 65
End: 2025-05-30
Payer: COMMERCIAL

## (undated) DEVICE — KIT ENDO TURNOVER/PROCEDURE W/CLEAN A SCOPE LINERS 103888

## (undated) RX ORDER — FENTANYL CITRATE 50 UG/ML
INJECTION, SOLUTION INTRAMUSCULAR; INTRAVENOUS
Status: DISPENSED
Start: 2023-11-08

## (undated) RX ORDER — FENTANYL CITRATE 50 UG/ML
INJECTION, SOLUTION INTRAMUSCULAR; INTRAVENOUS
Status: DISPENSED
Start: 2018-08-03